# Patient Record
Sex: FEMALE | Race: ASIAN | NOT HISPANIC OR LATINO | URBAN - METROPOLITAN AREA
[De-identification: names, ages, dates, MRNs, and addresses within clinical notes are randomized per-mention and may not be internally consistent; named-entity substitution may affect disease eponyms.]

---

## 2018-09-08 ENCOUNTER — INPATIENT (INPATIENT)
Facility: HOSPITAL | Age: 58
LOS: 11 days | Discharge: NURSING, REHAB OR LONG-TERM CARE | DRG: 871 | End: 2018-09-20
Attending: INTERNAL MEDICINE | Admitting: INTERNAL MEDICINE
Payer: MEDICAID

## 2018-09-08 VITALS
DIASTOLIC BLOOD PRESSURE: 92 MMHG | RESPIRATION RATE: 22 BRPM | WEIGHT: 139.99 LBS | HEART RATE: 100 BPM | SYSTOLIC BLOOD PRESSURE: 155 MMHG

## 2018-09-08 DIAGNOSIS — Z29.9 ENCOUNTER FOR PROPHYLACTIC MEASURES, UNSPECIFIED: ICD-10-CM

## 2018-09-08 DIAGNOSIS — R41.82 ALTERED MENTAL STATUS, UNSPECIFIED: ICD-10-CM

## 2018-09-08 DIAGNOSIS — R41.89 OTHER SYMPTOMS AND SIGNS INVOLVING COGNITIVE FUNCTIONS AND AWARENESS: ICD-10-CM

## 2018-09-08 DIAGNOSIS — I10 ESSENTIAL (PRIMARY) HYPERTENSION: ICD-10-CM

## 2018-09-08 DIAGNOSIS — I51.9 HEART DISEASE, UNSPECIFIED: ICD-10-CM

## 2018-09-08 DIAGNOSIS — E11.9 TYPE 2 DIABETES MELLITUS WITHOUT COMPLICATIONS: ICD-10-CM

## 2018-09-08 DIAGNOSIS — E72.20 DISORDER OF UREA CYCLE METABOLISM, UNSPECIFIED: ICD-10-CM

## 2018-09-08 DIAGNOSIS — Z98.890 OTHER SPECIFIED POSTPROCEDURAL STATES: Chronic | ICD-10-CM

## 2018-09-08 LAB
ALBUMIN SERPL ELPH-MCNC: 3.7 G/DL — SIGNIFICANT CHANGE UP (ref 3.5–5)
ALP SERPL-CCNC: 85 U/L — SIGNIFICANT CHANGE UP (ref 40–120)
ALT FLD-CCNC: 44 U/L DA — SIGNIFICANT CHANGE UP (ref 10–60)
AMMONIA BLD-MCNC: 87 UMOL/L — HIGH (ref 11–32)
ANION GAP SERPL CALC-SCNC: 9 MMOL/L — SIGNIFICANT CHANGE UP (ref 5–17)
APAP SERPL-MCNC: <2 UG/ML — LOW (ref 10–30)
APPEARANCE UR: CLEAR — SIGNIFICANT CHANGE UP
APTT BLD: 34.2 SEC — SIGNIFICANT CHANGE UP (ref 27.5–37.4)
AST SERPL-CCNC: 25 U/L — SIGNIFICANT CHANGE UP (ref 10–40)
BASE EXCESS BLDA CALC-SCNC: -1.7 MMOL/L — SIGNIFICANT CHANGE UP (ref -2–2)
BASE EXCESS BLDA CALC-SCNC: -3.2 MMOL/L — LOW (ref -2–2)
BASOPHILS # BLD AUTO: 0.1 K/UL — SIGNIFICANT CHANGE UP (ref 0–0.2)
BASOPHILS NFR BLD AUTO: 0.5 % — SIGNIFICANT CHANGE UP (ref 0–2)
BILIRUB SERPL-MCNC: <0.1 MG/DL — LOW (ref 0.2–1.2)
BILIRUB UR-MCNC: NEGATIVE — SIGNIFICANT CHANGE UP
BLOOD GAS COMMENTS ARTERIAL: SIGNIFICANT CHANGE UP
BLOOD GAS COMMENTS ARTERIAL: SIGNIFICANT CHANGE UP
BUN SERPL-MCNC: 20 MG/DL — HIGH (ref 7–18)
CALCIUM SERPL-MCNC: 8.9 MG/DL — SIGNIFICANT CHANGE UP (ref 8.4–10.5)
CHLORIDE SERPL-SCNC: 104 MMOL/L — SIGNIFICANT CHANGE UP (ref 96–108)
CO2 SERPL-SCNC: 23 MMOL/L — SIGNIFICANT CHANGE UP (ref 22–31)
COLOR SPEC: YELLOW — SIGNIFICANT CHANGE UP
CREAT SERPL-MCNC: 0.77 MG/DL — SIGNIFICANT CHANGE UP (ref 0.5–1.3)
DIFF PNL FLD: ABNORMAL
EOSINOPHIL # BLD AUTO: 0.2 K/UL — SIGNIFICANT CHANGE UP (ref 0–0.5)
EOSINOPHIL NFR BLD AUTO: 1.7 % — SIGNIFICANT CHANGE UP (ref 0–6)
ETHANOL SERPL-MCNC: <3 MG/DL — SIGNIFICANT CHANGE UP (ref 0–10)
GLUCOSE BLDC GLUCOMTR-MCNC: 139 MG/DL — HIGH (ref 70–99)
GLUCOSE BLDC GLUCOMTR-MCNC: 142 MG/DL — HIGH (ref 70–99)
GLUCOSE BLDC GLUCOMTR-MCNC: 146 MG/DL — HIGH (ref 70–99)
GLUCOSE BLDC GLUCOMTR-MCNC: 163 MG/DL — HIGH (ref 70–99)
GLUCOSE BLDC GLUCOMTR-MCNC: 168 MG/DL — HIGH (ref 70–99)
GLUCOSE BLDC GLUCOMTR-MCNC: 198 MG/DL — HIGH (ref 70–99)
GLUCOSE BLDC GLUCOMTR-MCNC: 65 MG/DL — LOW (ref 70–99)
GLUCOSE BLDC GLUCOMTR-MCNC: 74 MG/DL — SIGNIFICANT CHANGE UP (ref 70–99)
GLUCOSE BLDC GLUCOMTR-MCNC: 78 MG/DL — SIGNIFICANT CHANGE UP (ref 70–99)
GLUCOSE SERPL-MCNC: 151 MG/DL — HIGH (ref 70–99)
GLUCOSE UR QL: NEGATIVE — SIGNIFICANT CHANGE UP
HCO3 BLDA-SCNC: 22 MMOL/L — LOW (ref 23–27)
HCO3 BLDA-SCNC: 22 MMOL/L — LOW (ref 23–27)
HCT VFR BLD CALC: 35.5 % — SIGNIFICANT CHANGE UP (ref 34.5–45)
HGB BLD-MCNC: 11.6 G/DL — SIGNIFICANT CHANGE UP (ref 11.5–15.5)
HOROWITZ INDEX BLDA+IHG-RTO: 28 — SIGNIFICANT CHANGE UP
HOROWITZ INDEX BLDA+IHG-RTO: 40 — SIGNIFICANT CHANGE UP
INR BLD: 0.93 RATIO — SIGNIFICANT CHANGE UP (ref 0.88–1.16)
KETONES UR-MCNC: ABNORMAL
LACTATE SERPL-SCNC: 0.6 MMOL/L — LOW (ref 0.7–2)
LEUKOCYTE ESTERASE UR-ACNC: ABNORMAL
LYMPHOCYTES # BLD AUTO: 1.6 K/UL — SIGNIFICANT CHANGE UP (ref 1–3.3)
LYMPHOCYTES # BLD AUTO: 14.3 % — SIGNIFICANT CHANGE UP (ref 13–44)
MCHC RBC-ENTMCNC: 28 PG — SIGNIFICANT CHANGE UP (ref 27–34)
MCHC RBC-ENTMCNC: 32.5 GM/DL — SIGNIFICANT CHANGE UP (ref 32–36)
MCV RBC AUTO: 85.9 FL — SIGNIFICANT CHANGE UP (ref 80–100)
MONOCYTES # BLD AUTO: 0.6 K/UL — SIGNIFICANT CHANGE UP (ref 0–0.9)
MONOCYTES NFR BLD AUTO: 5 % — SIGNIFICANT CHANGE UP (ref 2–14)
NEUTROPHILS # BLD AUTO: 8.9 K/UL — HIGH (ref 1.8–7.4)
NEUTROPHILS NFR BLD AUTO: 78.4 % — HIGH (ref 43–77)
NITRITE UR-MCNC: NEGATIVE — SIGNIFICANT CHANGE UP
PCO2 BLDA: 36 MMHG — SIGNIFICANT CHANGE UP (ref 32–46)
PCO2 BLDA: 40 MMHG — SIGNIFICANT CHANGE UP (ref 32–46)
PCP SPEC-MCNC: SIGNIFICANT CHANGE UP
PH BLDA: 7.35 — SIGNIFICANT CHANGE UP (ref 7.35–7.45)
PH BLDA: 7.4 — SIGNIFICANT CHANGE UP (ref 7.35–7.45)
PH UR: 6 — SIGNIFICANT CHANGE UP (ref 5–8)
PLATELET # BLD AUTO: 329 K/UL — SIGNIFICANT CHANGE UP (ref 150–400)
PO2 BLDA: 100 MMHG — SIGNIFICANT CHANGE UP (ref 74–108)
PO2 BLDA: 92 MMHG — SIGNIFICANT CHANGE UP (ref 74–108)
POTASSIUM SERPL-MCNC: 4.6 MMOL/L — SIGNIFICANT CHANGE UP (ref 3.5–5.3)
POTASSIUM SERPL-SCNC: 4.6 MMOL/L — SIGNIFICANT CHANGE UP (ref 3.5–5.3)
PROT SERPL-MCNC: 8.3 G/DL — SIGNIFICANT CHANGE UP (ref 6–8.3)
PROT UR-MCNC: 100
PROTHROM AB SERPL-ACNC: 10.1 SEC — SIGNIFICANT CHANGE UP (ref 9.8–12.7)
RBC # BLD: 4.13 M/UL — SIGNIFICANT CHANGE UP (ref 3.8–5.2)
RBC # FLD: 12.9 % — SIGNIFICANT CHANGE UP (ref 10.3–14.5)
SALICYLATES SERPL-MCNC: <1.7 MG/DL — LOW (ref 2.8–20)
SAO2 % BLDA: 97 % — HIGH (ref 92–96)
SAO2 % BLDA: 97 % — HIGH (ref 92–96)
SODIUM SERPL-SCNC: 136 MMOL/L — SIGNIFICANT CHANGE UP (ref 135–145)
SP GR SPEC: 1.01 — SIGNIFICANT CHANGE UP (ref 1.01–1.02)
T4 AB SER-ACNC: 10.9 UG/DL — SIGNIFICANT CHANGE UP (ref 4.6–12)
TROPONIN I SERPL-MCNC: <0.015 NG/ML — SIGNIFICANT CHANGE UP (ref 0–0.04)
TSH SERPL-MCNC: 0.62 UU/ML — SIGNIFICANT CHANGE UP (ref 0.34–4.82)
UROBILINOGEN FLD QL: NEGATIVE — SIGNIFICANT CHANGE UP
WBC # BLD: 11.3 K/UL — HIGH (ref 3.8–10.5)
WBC # FLD AUTO: 11.3 K/UL — HIGH (ref 3.8–10.5)

## 2018-09-08 PROCEDURE — 99285 EMERGENCY DEPT VISIT HI MDM: CPT

## 2018-09-08 PROCEDURE — 71045 X-RAY EXAM CHEST 1 VIEW: CPT | Mod: 26

## 2018-09-08 PROCEDURE — 71045 X-RAY EXAM CHEST 1 VIEW: CPT | Mod: 26,77

## 2018-09-08 PROCEDURE — 70450 CT HEAD/BRAIN W/O DYE: CPT | Mod: 26

## 2018-09-08 PROCEDURE — 31500 INSERT EMERGENCY AIRWAY: CPT | Mod: 59

## 2018-09-08 RX ORDER — VANCOMYCIN HCL 1 G
1000 VIAL (EA) INTRAVENOUS ONCE
Qty: 0 | Refills: 0 | Status: COMPLETED | OUTPATIENT
Start: 2018-09-08 | End: 2018-09-08

## 2018-09-08 RX ORDER — SIMVASTATIN 20 MG/1
20 TABLET, FILM COATED ORAL AT BEDTIME
Qty: 0 | Refills: 0 | Status: DISCONTINUED | OUTPATIENT
Start: 2018-09-08 | End: 2018-09-20

## 2018-09-08 RX ORDER — LOSARTAN POTASSIUM 100 MG/1
100 TABLET, FILM COATED ORAL DAILY
Qty: 0 | Refills: 0 | Status: DISCONTINUED | OUTPATIENT
Start: 2018-09-08 | End: 2018-09-08

## 2018-09-08 RX ORDER — ACETAMINOPHEN 500 MG
650 TABLET ORAL EVERY 6 HOURS
Qty: 0 | Refills: 0 | Status: DISCONTINUED | OUTPATIENT
Start: 2018-09-08 | End: 2018-09-20

## 2018-09-08 RX ORDER — RANITIDINE HYDROCHLORIDE 150 MG/1
1 TABLET, FILM COATED ORAL
Qty: 0 | Refills: 0 | COMMUNITY

## 2018-09-08 RX ORDER — PANTOPRAZOLE SODIUM 20 MG/1
40 TABLET, DELAYED RELEASE ORAL
Qty: 0 | Refills: 0 | Status: DISCONTINUED | OUTPATIENT
Start: 2018-09-08 | End: 2018-09-09

## 2018-09-08 RX ORDER — SODIUM CHLORIDE 9 MG/ML
1000 INJECTION, SOLUTION INTRAVENOUS
Qty: 0 | Refills: 0 | Status: DISCONTINUED | OUTPATIENT
Start: 2018-09-08 | End: 2018-09-09

## 2018-09-08 RX ORDER — MORPHINE SULFATE 50 MG/1
2 CAPSULE, EXTENDED RELEASE ORAL EVERY 4 HOURS
Qty: 0 | Refills: 0 | Status: DISCONTINUED | OUTPATIENT
Start: 2018-09-08 | End: 2018-09-10

## 2018-09-08 RX ORDER — PROPOFOL 10 MG/ML
5 INJECTION, EMULSION INTRAVENOUS
Qty: 500 | Refills: 0 | Status: DISCONTINUED | OUTPATIENT
Start: 2018-09-08 | End: 2018-09-08

## 2018-09-08 RX ORDER — DEXTROSE 50 % IN WATER 50 %
25 SYRINGE (ML) INTRAVENOUS ONCE
Qty: 0 | Refills: 0 | Status: DISCONTINUED | OUTPATIENT
Start: 2018-09-08 | End: 2018-09-10

## 2018-09-08 RX ORDER — HEPARIN SODIUM 5000 [USP'U]/ML
5000 INJECTION INTRAVENOUS; SUBCUTANEOUS EVERY 8 HOURS
Qty: 0 | Refills: 0 | Status: DISCONTINUED | OUTPATIENT
Start: 2018-09-08 | End: 2018-09-09

## 2018-09-08 RX ORDER — DEXTROSE 50 % IN WATER 50 %
25 SYRINGE (ML) INTRAVENOUS ONCE
Qty: 0 | Refills: 0 | Status: DISCONTINUED | OUTPATIENT
Start: 2018-09-08 | End: 2018-09-12

## 2018-09-08 RX ORDER — PIPERACILLIN AND TAZOBACTAM 4; .5 G/20ML; G/20ML
3.38 INJECTION, POWDER, LYOPHILIZED, FOR SOLUTION INTRAVENOUS ONCE
Qty: 0 | Refills: 0 | Status: COMPLETED | OUTPATIENT
Start: 2018-09-08 | End: 2018-09-08

## 2018-09-08 RX ORDER — INSULIN LISPRO 100/ML
VIAL (ML) SUBCUTANEOUS
Qty: 0 | Refills: 0 | Status: DISCONTINUED | OUTPATIENT
Start: 2018-09-08 | End: 2018-09-08

## 2018-09-08 RX ORDER — SIMVASTATIN 20 MG/1
1 TABLET, FILM COATED ORAL
Qty: 0 | Refills: 0 | COMMUNITY

## 2018-09-08 RX ORDER — SODIUM CHLORIDE 9 MG/ML
1000 INJECTION INTRAMUSCULAR; INTRAVENOUS; SUBCUTANEOUS ONCE
Qty: 0 | Refills: 0 | Status: COMPLETED | OUTPATIENT
Start: 2018-09-08 | End: 2018-09-08

## 2018-09-08 RX ORDER — CARVEDILOL PHOSPHATE 80 MG/1
12.5 CAPSULE, EXTENDED RELEASE ORAL EVERY 12 HOURS
Qty: 0 | Refills: 0 | Status: DISCONTINUED | OUTPATIENT
Start: 2018-09-08 | End: 2018-09-08

## 2018-09-08 RX ORDER — SODIUM CHLORIDE 9 MG/ML
1000 INJECTION, SOLUTION INTRAVENOUS
Qty: 0 | Refills: 0 | Status: DISCONTINUED | OUTPATIENT
Start: 2018-09-08 | End: 2018-09-08

## 2018-09-08 RX ORDER — GABAPENTIN 400 MG/1
1 CAPSULE ORAL
Qty: 0 | Refills: 0 | COMMUNITY

## 2018-09-08 RX ORDER — SODIUM CHLORIDE 9 MG/ML
1000 INJECTION, SOLUTION INTRAVENOUS
Qty: 0 | Refills: 0 | Status: DISCONTINUED | OUTPATIENT
Start: 2018-09-08 | End: 2018-09-10

## 2018-09-08 RX ORDER — INSULIN HUMAN 100 [IU]/ML
INJECTION, SOLUTION SUBCUTANEOUS EVERY 6 HOURS
Qty: 0 | Refills: 0 | Status: DISCONTINUED | OUTPATIENT
Start: 2018-09-08 | End: 2018-09-08

## 2018-09-08 RX ORDER — DEXTROSE 50 % IN WATER 50 %
50 SYRINGE (ML) INTRAVENOUS ONCE
Qty: 0 | Refills: 0 | Status: COMPLETED | OUTPATIENT
Start: 2018-09-08 | End: 2018-09-08

## 2018-09-08 RX ORDER — ASPIRIN/CALCIUM CARB/MAGNESIUM 324 MG
81 TABLET ORAL DAILY
Qty: 0 | Refills: 0 | Status: DISCONTINUED | OUTPATIENT
Start: 2018-09-08 | End: 2018-09-09

## 2018-09-08 RX ORDER — FERROUS SULFATE 325(65) MG
325 TABLET ORAL DAILY
Qty: 0 | Refills: 0 | Status: DISCONTINUED | OUTPATIENT
Start: 2018-09-08 | End: 2018-09-09

## 2018-09-08 RX ORDER — GLUCAGON INJECTION, SOLUTION 0.5 MG/.1ML
1 INJECTION, SOLUTION SUBCUTANEOUS ONCE
Qty: 0 | Refills: 0 | Status: DISCONTINUED | OUTPATIENT
Start: 2018-09-08 | End: 2018-09-10

## 2018-09-08 RX ORDER — INSULIN LISPRO 100/ML
VIAL (ML) SUBCUTANEOUS EVERY 6 HOURS
Qty: 0 | Refills: 0 | Status: DISCONTINUED | OUTPATIENT
Start: 2018-09-08 | End: 2018-09-09

## 2018-09-08 RX ORDER — FENTANYL CITRATE 50 UG/ML
0.5 INJECTION INTRAVENOUS
Qty: 2500 | Refills: 0 | Status: DISCONTINUED | OUTPATIENT
Start: 2018-09-08 | End: 2018-09-10

## 2018-09-08 RX ORDER — DEXTROSE 50 % IN WATER 50 %
15 SYRINGE (ML) INTRAVENOUS ONCE
Qty: 0 | Refills: 0 | Status: DISCONTINUED | OUTPATIENT
Start: 2018-09-08 | End: 2018-09-10

## 2018-09-08 RX ORDER — FERROUS SULFATE 325(65) MG
1 TABLET ORAL
Qty: 0 | Refills: 0 | COMMUNITY

## 2018-09-08 RX ORDER — DEXTROSE 50 % IN WATER 50 %
12.5 SYRINGE (ML) INTRAVENOUS ONCE
Qty: 0 | Refills: 0 | Status: DISCONTINUED | OUTPATIENT
Start: 2018-09-08 | End: 2018-09-10

## 2018-09-08 RX ORDER — ENOXAPARIN SODIUM 100 MG/ML
40 INJECTION SUBCUTANEOUS DAILY
Qty: 0 | Refills: 0 | Status: DISCONTINUED | OUTPATIENT
Start: 2018-09-08 | End: 2018-09-08

## 2018-09-08 RX ORDER — PROPOFOL 10 MG/ML
4.77 INJECTION, EMULSION INTRAVENOUS
Qty: 1000 | Refills: 0 | Status: DISCONTINUED | OUTPATIENT
Start: 2018-09-08 | End: 2018-09-09

## 2018-09-08 RX ORDER — ASPIRIN/CALCIUM CARB/MAGNESIUM 324 MG
300 TABLET ORAL ONCE
Qty: 0 | Refills: 0 | Status: COMPLETED | OUTPATIENT
Start: 2018-09-08 | End: 2018-09-08

## 2018-09-08 RX ORDER — PROPOFOL 10 MG/ML
5 INJECTION, EMULSION INTRAVENOUS
Qty: 1000 | Refills: 0 | Status: DISCONTINUED | OUTPATIENT
Start: 2018-09-08 | End: 2018-09-08

## 2018-09-08 RX ORDER — CARVEDILOL PHOSPHATE 80 MG/1
25 CAPSULE, EXTENDED RELEASE ORAL EVERY 12 HOURS
Qty: 0 | Refills: 0 | Status: DISCONTINUED | OUTPATIENT
Start: 2018-09-08 | End: 2018-09-08

## 2018-09-08 RX ORDER — PIPERACILLIN AND TAZOBACTAM 4; .5 G/20ML; G/20ML
3.38 INJECTION, POWDER, LYOPHILIZED, FOR SOLUTION INTRAVENOUS EVERY 8 HOURS
Qty: 0 | Refills: 0 | Status: DISCONTINUED | OUTPATIENT
Start: 2018-09-08 | End: 2018-09-09

## 2018-09-08 RX ADMIN — PROPOFOL 1.91 MICROGRAM(S)/KG/MIN: 10 INJECTION, EMULSION INTRAVENOUS at 22:25

## 2018-09-08 RX ADMIN — FENTANYL CITRATE 3.33 MICROGRAM(S)/KG/HR: 50 INJECTION INTRAVENOUS at 22:15

## 2018-09-08 RX ADMIN — PROPOFOL 1.91 MICROGRAM(S)/KG/MIN: 10 INJECTION, EMULSION INTRAVENOUS at 11:49

## 2018-09-08 RX ADMIN — Medication 650 MILLIGRAM(S): at 00:00

## 2018-09-08 RX ADMIN — Medication 2 MILLIGRAM(S): at 17:53

## 2018-09-08 RX ADMIN — Medication 2 MILLIGRAM(S): at 13:45

## 2018-09-08 RX ADMIN — Medication 50 MILLILITER(S): at 16:40

## 2018-09-08 RX ADMIN — SIMVASTATIN 20 MILLIGRAM(S): 20 TABLET, FILM COATED ORAL at 22:25

## 2018-09-08 RX ADMIN — SODIUM CHLORIDE 80 MILLILITER(S): 9 INJECTION, SOLUTION INTRAVENOUS at 15:38

## 2018-09-08 RX ADMIN — Medication 300 MILLIGRAM(S): at 11:04

## 2018-09-08 RX ADMIN — SODIUM CHLORIDE 75 MILLILITER(S): 9 INJECTION, SOLUTION INTRAVENOUS at 14:00

## 2018-09-08 RX ADMIN — PIPERACILLIN AND TAZOBACTAM 200 GRAM(S): 4; .5 INJECTION, POWDER, LYOPHILIZED, FOR SOLUTION INTRAVENOUS at 11:04

## 2018-09-08 RX ADMIN — MORPHINE SULFATE 2 MILLIGRAM(S): 50 CAPSULE, EXTENDED RELEASE ORAL at 14:20

## 2018-09-08 RX ADMIN — SODIUM CHLORIDE 2000 MILLILITER(S): 9 INJECTION INTRAMUSCULAR; INTRAVENOUS; SUBCUTANEOUS at 15:21

## 2018-09-08 RX ADMIN — SODIUM CHLORIDE 2000 MILLILITER(S): 9 INJECTION INTRAMUSCULAR; INTRAVENOUS; SUBCUTANEOUS at 22:25

## 2018-09-08 RX ADMIN — PROPOFOL 1.91 MICROGRAM(S)/KG/MIN: 10 INJECTION, EMULSION INTRAVENOUS at 15:55

## 2018-09-08 RX ADMIN — ENOXAPARIN SODIUM 40 MILLIGRAM(S): 100 INJECTION SUBCUTANEOUS at 12:22

## 2018-09-08 RX ADMIN — MORPHINE SULFATE 2 MILLIGRAM(S): 50 CAPSULE, EXTENDED RELEASE ORAL at 14:50

## 2018-09-08 RX ADMIN — PROPOFOL 1.91 MICROGRAM(S)/KG/MIN: 10 INJECTION, EMULSION INTRAVENOUS at 11:05

## 2018-09-08 RX ADMIN — HEPARIN SODIUM 5000 UNIT(S): 5000 INJECTION INTRAVENOUS; SUBCUTANEOUS at 22:24

## 2018-09-08 RX ADMIN — SODIUM CHLORIDE 2000 MILLILITER(S): 9 INJECTION INTRAMUSCULAR; INTRAVENOUS; SUBCUTANEOUS at 23:46

## 2018-09-08 RX ADMIN — Medication 650 MILLIGRAM(S): at 22:41

## 2018-09-08 NOTE — H&P ADULT - PROBLEM SELECTOR PLAN 4
-Family not sure about exact diagnosis  -c/w aspirin, statin and coreg  -Holding Losartan and HCTZ for now, restart as appropriate.  -f/u ECHO. -c/w Coreg for now.   -Hold Losartan and HCTZ for now , restart if needed -Hold home BP meds Coreg, Losartan and HCTZ for now as BP low normal, restart if needed

## 2018-09-08 NOTE — PROGRESS NOTE ADULT - SUBJECTIVE AND OBJECTIVE BOX
MED ATTENDING INITIAL KENISHA       JACQUELIN LINDER  MRN-[id number]    Patient is a 57y old  Female who presents with a chief complaint of unresponsiveness (08 Sep 2018 13:02)      HISTORY OF PRESENT ILLNESS:  58 y/o female from [facility] rehab with PMH of HTN, DM and ?cardiac disease (family not sure about exact diagnosis) and PSH of recent C spine surgery (1 month back at [place] for ?nerve compression) sent to ED for unresponsiveness. As per EMS, patient was thought to have low blood sugars so was treated for that without improvement in mental status. Fs in . CT head was unremarkable. CXR clear. Utox only positive for benzodiazepine ([patient is on Valium 5 mg q12). Patient was intubated by ED attending and admitted to ICU for further management.     Per rehab papers, patient was noted to have blood sugars of 49 last night at 9 pm, was given 2 mg of glucagon IM. AM fs at rehab was noted to be 73 and patient was minimally responsive so EMS was called. Spoke to Dr. Welch who takes care of her at rehab. Staff at rehab found patient's home medications at bedside, seems patient took her own Insulin on top of the Insulin given bu rehab staff , leading to hypoglycemia and unresponsiveness. Patient was last seen normal last night before dinner.     MEDICATIONS  (STANDING):  aspirin  chewable 81 milliGRAM(s) Oral daily  dextrose 5% + sodium chloride 0.9%. 1000 milliLiter(s) (80 mL/Hr) IV Continuous <Continuous>  dextrose 5%. 1000 milliLiter(s) (50 mL/Hr) IV Continuous <Continuous>  dextrose 50% Injectable 12.5 Gram(s) IV Push once  dextrose 50% Injectable 25 Gram(s) IV Push once  dextrose 50% Injectable 25 Gram(s) IV Push once  ferrous    sulfate 325 milliGRAM(s) Oral daily  heparin  Injectable 5000 Unit(s) SubCutaneous every 8 hours  insulin lispro (HumaLOG) corrective regimen sliding scale   SubCutaneous every 6 hours  pantoprazole   Suspension 40 milliGRAM(s) Oral before breakfast  propofol Infusion 4.767 MICROgram(s)/kG/Min (1.905 mL/Hr) IV Continuous <Continuous>  simvastatin 20 milliGRAM(s) Oral at bedtime      MEDICATIONS  (PRN):  dextrose 40% Gel 15 Gram(s) Oral once PRN Blood Glucose LESS THAN 70 milliGRAM(s)/deciliter  glucagon  Injectable 1 milliGRAM(s) IntraMuscular once PRN Glucose LESS THAN 70 milligrams/deciliter  LORazepam   Injectable 2 milliGRAM(s) IV Push every 4 hours PRN Agitation  morphine  - Injectable 2 milliGRAM(s) IV Push every 4 hours PRN Severe Pain (7 - 10)      Allergies    No Known Allergies    Intolerances        PAST MEDICAL & SURGICAL HISTORY:  Cardiac disease  DM (diabetes mellitus)  HTN (hypertension)  H/O cervical spine surgery      FAMILY HISTORY:  Family history unknown      SOCIAL HISTORY  Smoking History:     REVIEW OF SYSTEMS:    CONSTITUTIONAL:  Fevers [  ]  Yes  [  X]  No                                   chills [  ]  Yes  [X  ]  No                               sweats  [  ]  Yes  [ X ]  No                                fatigue [  ]  Yes  [ X ]  No    HEENT:  Eyes:  Diplopia or blurred vision [  ]  Yes  [  ]  No    ENT:                        earache  [  ]  Yes  [  ]  No                             sore throat  [  ]  Yes  [  ]  No                            runny nose.  [  ]  Yes  [  ]  No    CARDIOVASCULAR:       chest pain  [  ]  Yes  [  ]  No                        squeezing, tightness,  [  ]  Yes  [  ]  No                                      palpitations.  [  ]  Yes  [  ]  No    RESPIRATORY:             Cough [  ]  Yes  [  ]  No                                  Wheezing [  ]  Yes  [  ]  No                                Hemoptysis [  ]  Yes  [  ]  No                                      Sputum [  ]  Yes  [  ]  No                   Shortness of Breathe [  ]  Yes  [  ]  No    GASTROINTESTINAL:      Abdominal pain  [  ]  Yes  [  ]  No                                                    Nausea  [  ]  Yes  [  ]  No                                                   Vomiting [  ]  Yes  [  ]  No                                              Constipation [  ]  Yes  [  ]  No                                                    Diarrhea [  ]  Yes  [  ]  No    GENITOURINARY:           Incontinence [  ]  Yes  [  ]  No                                                Dysuria [  ]  Yes  [  ]  No                                      Blood in Urine  [  ]  Yes  [  ]  No                          Frequency or urgency.  [  ]  Yes  [  ]  No    NEUROLOGIC:   UNRESPONSIVE AT PRESENT                  Paresthesias  [  ]  Yes  [  ]  No                                             fasciculations  [  ]  Yes  [  ]  No                                seizures or weakness.  [  ]  Yes  [  ]  No                                                   Headache [  ]  Yes  [  ]  No                                  Loss of Conciousness [  ]  Yes  [  ]  No                                                     Insomnia [  ]  Yes  [  ]  No    PSYCHIATRIC:    Disorder of thought or mood.  [  ]  Yes  [  ]  No                                                           Anxiety [  ]  Yes  [  ]  No                                                      Depression [  ]  Yes  [  ]  No                                                         Dementia [  ]  Yes  [  ]  No    Vital Signs Last 24 Hrs  T(C): 37.8 (08 Sep 2018 15:00), Max: 37.8 (08 Sep 2018 15:00)  T(F): 100 (08 Sep 2018 15:00), Max: 100 (08 Sep 2018 15:00)  HR: 88 (08 Sep 2018 15:30) (88 - 109)  BP: 92/59 (08 Sep 2018 15:30) (83/54 - 183/155)  BP(mean): 66 (08 Sep 2018 15:30) (60 - 162)  RR: 12 (08 Sep 2018 15:30) (12 - 26)  SpO2: 100% (08 Sep 2018 15:30) (95% - 100%)  I&O's Detail    08 Sep 2018 07:01  -  08 Sep 2018 15:48  --------------------------------------------------------  IN:    dextrose 5% + sodium chloride 0.45%.: 150 mL    dextrose 5% + sodium chloride 0.9%.: 80 mL    propofol Infusion: 58.8 mL  Total IN: 288.8 mL    OUT:    Indwelling Catheter - Urethral: 170 mL  Total OUT: 170 mL    Total NET: 118.8 mL          PHYSICAL EXAMINATION:    GENERAL: The patient is a well-developed, well-nourished _____in no apparent distress.     HEENT:  ON VENT     NECK: Supple. jvd [  ]  Yes  [  X]  No    LUNGS: Clear to auscultation  [  X]  Yes  [  ]  No                               wheezing, [  ]  Yes  [X  ]  No                                      rales  [  ]  Yes  [  ]X  No                                    rhonchi  [  ]  Yes  [  X]  No                 Respirations labored  [  ]  Yes  [  ]  No    HEART: Regular rate and rhythm  [ X ]  Yes  [  ]  No                                        Murmur [  ]  Yes  [  X]  No                                              rub  [  ]  Yes  [ X ]  No                                          gallop  [  ]  Yes  [  ]X  No    ABDOMEN:                                 Soft, X[  ]  Yes  [  ]  No                                             nontender [X  ]  Yes  [  ]  No                                             distended.[  ]  Yes  [X  ]  No                            hepatosplenomegaly ,[  ]  Yes  [ X ]  No                                                    BS   [  ]  Yes  [  ]  No    EXTREMITIES:                cyanosis, [  ]  Yes  [ X ]  No                                       clubbing,   [  ]  Yes  [  X]  No                                               rash, [  ]  Yes  [ X ]  No                                           edema.  [  ]  Yes  [ X ]  No    NEUROLOGIC:   UN RESPONSIVE .      LABS:                        11.6   11.3  )-----------( 329      ( 08 Sep 2018 08:27 )             35.5         136  |  104  |  20<H>  ----------------------------<  151<H>  4.6   |  23  |  0.77    Ca    8.9      08 Sep 2018 08:27    TPro  8.3  /  Alb  3.7  /  TBili  <0.1<L>  /  DBili  x   /  AST  25  /  ALT  44  /  AlkPhos  85      PT/INR - ( 08 Sep 2018 08:27 )   PT: 10.1 sec;   INR: 0.93 ratio         PTT - ( 08 Sep 2018 08:27 )  PTT:34.2 sec  Urinalysis Basic - ( 08 Sep 2018 15:09 )    Color: Yellow / Appearance: Clear / S.010 / pH: x  Gluc: x / Ketone: Trace  / Bili: Negative / Urobili: Negative   Blood: x / Protein: 100 / Nitrite: Negative   Leuk Esterase: Trace / RBC: 0-2 /HPF / WBC 0-2 /HPF   Sq Epi: x / Non Sq Epi: Few /HPF / Bacteria: Negative /HPF      ABG - ( 08 Sep 2018 10:08 )  pH, Arterial: 7.40  pH, Blood: x     /  pCO2: 36    /  pO2: 92    / HCO3: 22    / Base Excess: -1.7  /  SaO2: 97                CARDIAC MARKERS ( 08 Sep 2018 08:27 )  <0.015 ng/mL / x     / x     / x     / x              Lactate, Blood: 0.6 mmol/L (18 @ 08:27)        MICROBIOLOGY:    RADIOLOGY & ADDITIONAL STUDIES:    CXR:  < from: Xray Chest 1 View AP/PA (18 @ 10:06) >  Impression: Successful placement of ET tube and NG tube  Question of developing infiltrate at the right lung base.      < from: CT Head No Cont (18 @ 07:55) >  IMPRESSION:     Limited study demonstrates no obvious abnormality as described above.            < end of copied text >    < end of copied text >    Ct scan chest:    ekg;    echo:

## 2018-09-08 NOTE — CONSULT NOTE ADULT - SUBJECTIVE AND OBJECTIVE BOX
Patient is a 57y old  Female who presents with a chief complaint of unresponsiveness (08 Sep 2018 11:48)      Initial HPI on admission:  HPI:  56 y/o female from [facility] rehab with PMH of HTN, DM and ?cardiac disease (family not sure about exact diagnosis)  sent to ED for unresponsiveness. As per EMS, patient was thought to have low blood sugars so was treated for that without improvement in mental status. Fs in . CT head was unremarkable. CXR clear. Utox only positive for benzodiazepine ([patient is on Valium 5 mg q12). Patient was intubated by ED attending and admitted to ICU for further management.     Per rehab papers, patient was noted to have blood sugars of 49 last night at 9 pm, was given 2 mg of glucagon IM. AM fs at rehab was noted to be 73 and patient was minimally responsive so EMS was called. Spoke to Dr. Welch who takes care of her at rehab. Staff at rehab found patient's home medications at bedside, seems patient took her own Insulin on top of the Insulin given bu rehab staff , leading to hypoglycemia and unresponsiveness. Patient was last seen normal last night before dinner. (08 Sep 2018 11:48)      BRIEF HOSPITAL COURSE: ***    PAST MEDICAL & SURGICAL HISTORY:  DM (diabetes mellitus)  HTN (hypertension)    Allergies    No Known Allergies    Intolerances      FAMILY HISTORY:    Social history reviewed: ***    Review of Systems: pat is intubated and unresponsiv  CONSTITUTIONAL: No fever, chills, or fatigue  EYES: No eye pain, visual disturbances, or discharge  ENMT:  No difficulty hearing, tinnitus, vertigo; No sinus or throat pain  NECK: No pain or stiffness  RESPIRATORY: No cough, wheezing, chills or hemoptysis; No shortness of breath  CARDIOVASCULAR: No chest pain, palpitations, dizziness, or leg swelling  GASTROINTESTINAL: No abdominal or epigastric pain. No nausea, vomiting, or hematemesis; No diarrhea or constipation. No melena or hematochezia.  GENITOURINARY: No dysuria, frequency, hematuria, or incontinence  NEUROLOGICAL: No headaches, memory loss, loss of strength, numbness, or tremors  SKIN: No itching, burning, rashes, or lesions   MUSCULOSKELETAL: No joint pain or swelling; No muscle, back, or extremity pain  PSYCHIATRIC: No depression, anxiety, mood swings, or difficulty sleeping      Medications:  aspirin  chewable 81 milliGRAM(s) Oral daily  carvedilol 25 milliGRAM(s) Oral every 12 hours  dextrose 40% Gel 15 Gram(s) Oral once PRN  dextrose 5% + sodium chloride 0.45%. 1000 milliLiter(s) IV Continuous <Continuous>  dextrose 5%. 1000 milliLiter(s) IV Continuous <Continuous>  dextrose 50% Injectable 12.5 Gram(s) IV Push once  dextrose 50% Injectable 25 Gram(s) IV Push once  dextrose 50% Injectable 25 Gram(s) IV Push once  ferrous    sulfate 325 milliGRAM(s) Oral daily  glucagon  Injectable 1 milliGRAM(s) IntraMuscular once PRN  heparin  Injectable 5000 Unit(s) SubCutaneous every 8 hours  insulin lispro (HumaLOG) corrective regimen sliding scale   SubCutaneous three times a day before meals  losartan 100 milliGRAM(s) Oral daily  pantoprazole   Suspension 40 milliGRAM(s) Oral before breakfast  propofol Infusion 4.767 MICROgram(s)/kG/Min IV Continuous <Continuous>  simvastatin 20 milliGRAM(s) Oral at bedtime      vent settings  Mode: AC/ CMV (Assist Control/ Continuous Mandatory Ventilation)  RR (machine): 12  TV (machine): 450  FiO2: 40  PEEP: 5  ITime: 1  MAP: 10  PIP: 25      Vital Signs Last 24 Hrs  T(C): 36.9 (08 Sep 2018 11:22), Max: 36.9 (08 Sep 2018 11:22)  T(F): 98.5 (08 Sep 2018 11:22), Max: 98.5 (08 Sep 2018 11:22)  HR: 101 (08 Sep 2018 12:00) (100 - 109)  BP: 128/84 (08 Sep 2018 12:00) (110/80 - 183/155)  BP(mean): 93 (08 Sep 2018 12:00) (84 - 162)  RR: 14 (08 Sep 2018 12:00) (14 - 26)  SpO2: 100% (08 Sep 2018 12:00) (95% - 100%)    ABG - ( 08 Sep 2018 10:08 )  pH, Arterial: 7.40  pH, Blood: x     /  pCO2: 36    /  pO2: 92    / HCO3: 22    / Base Excess: -1.7  /  SaO2: 97                        LABS:                        11.6   11.3  )-----------( 329      ( 08 Sep 2018 08:27 )             35.5     09-08    136  |  104  |  20<H>  ----------------------------<  151<H>  4.6   |  23  |  0.77    Ca    8.9      08 Sep 2018 08:27    TPro  8.3  /  Alb  3.7  /  TBili  <0.1<L>  /  DBili  x   /  AST  25  /  ALT  44  /  AlkPhos  85  09-08      CARDIAC MARKERS ( 08 Sep 2018 08:27 )  <0.015 ng/mL / x     / x     / x     / x          CAPILLARY BLOOD GLUCOSE  251 (08 Sep 2018 08:38)      POCT Blood Glucose.: 251 mg/dL (08 Sep 2018 08:36)    PT/INR - ( 08 Sep 2018 08:27 )   PT: 10.1 sec;   INR: 0.93 ratio         PTT - ( 08 Sep 2018 08:27 )  PTT:34.2 sec    CULTURES:        Physical Examination:    General: No acute distress.      HEENT: Pupils equal, reactive to light.  Symmetric. + ETT    PULM: Clear to auscultation bilaterally, no significant sputum production    CVS: Regular rate and rhythm, no murmurs, rubs, or gallops    ABD: Soft, nondistended, nontender, normoactive bowel sounds, no masses    EXT: No edema, nontender    SKIN: Warm and well perfused, no rashes noted.    NEURO: comatose    RADIOLOGY REVIEWED ***    CXR:< from: Xray Chest 1 View AP/PA (09.08.18 @ 10:06) >  INTERPRETATION:  History: Postintubation    Portable radiograph of the chest formed and compared to study of earlier   in the day.    ET tube has been placed with the tip above the chencho and NG tube has   been placed overlying the stomach. The left lung is clear. There is mild   patchy density at the right base which may represent developing   infiltrate. Osseous structures are intact.    Impression: Successful placement of ET tube and NG tube  Question of developing infiltrate at the right lung base.      < end of copied text >      CT Head:< from: CT Head No Cont (09.08.18 @ 07:55) >  INTERPRETATION:  EXAM: CT HEAD WITHOUT CONTRAST.     CLINICAL INDICATION: Unresponsive.     TECHNIQUE: Noncontrast imaging was performed. Axial, sagittal and   coronal scans are reviewed.     PRIOR EXAM: None available.     FINDINGS:      Motion artifact limits the evaluation on multiple images.    There is no evidence of hydrocephalus, mass effect or any sizable   extra-axial collection. Due to motion artifact, subtle abnormalities   cannot be evaluated. Probably incidental lentiform nuclei calcification   is seen.    Bony calvarium, visualized mastoids, sinuses and orbits are unremarkable.      IMPRESSION:     Limited study demonstrates no obvious abnormality as described above.    < end of copied text >        IMPRESSION:PAST MEDICAL & SURGICAL HISTORY:  DM (diabetes mellitus)  HTN (hypertension)   p/w     IMP: This is a 57 yr old woman with prior mentioned medical found unresponsive due to hypoglycemia in rehab center. According to NH staff .. seems like pat took her own meds and also was given meds NH staff.  She was intubated for airway protection. mental status did not improve with administration od dextrose. CXR demonstrated possible new b/l infiltrate due to asp pna .  Plan:    CNS: no sedation, neurochescks     PULMONARY: continue vent support    CARDIAC: moniotor    GI: NGT feed    RENAL: monitor    SKIN: no issue    MUSCULOSKELETAL: PT    ID: hold antibx for now    HEME: FS q 2 hrs    DVT and GI Prophylaxis          Plan of care discussed with family and ICU team. and ER attending    CRITICAL CARE TIME SPENT: 36  minutes

## 2018-09-08 NOTE — H&P ADULT - PMH
DM (diabetes mellitus)    HTN (hypertension) Cardiac disease    DM (diabetes mellitus)    HTN (hypertension)

## 2018-09-08 NOTE — ED PROVIDER NOTE - CARE PLAN
Principal Discharge DX:	Altered mental status, unspecified altered mental status type  Secondary Diagnosis:	Hypoglycemia  Secondary Diagnosis:	Aspiration into airway, initial encounter

## 2018-09-08 NOTE — ED ADULT TRIAGE NOTE - CHIEF COMPLAINT QUOTE
Arrived w stertorous respirations, obtunded.  Limbs flaccid, no obvious trauma.  Elevated b/p in the field, fs wnl in the field

## 2018-09-08 NOTE — ED PROCEDURE NOTE - CPROC ED TIME OUT STATEMENT1
“Patient's name, , procedure and correct site were confirmed during the [place] Timeout.”
“Patient's name, , procedure and correct site were confirmed during the [place] Timeout.”
“Patient's name, , procedure and correct site were confirmed during the [place] Timeout.”

## 2018-09-08 NOTE — H&P ADULT - PROBLEM SELECTOR PLAN 2
-Home meds includes Basaglar 38 units at bedtime, Humalog 5 units pre meals and correctional scale   -Would only start on Humalog correction scale for now  -Monitor fs every 2 hours for now and adjust regimen as needed  -f/u HbA1c. -No renal or liver abnormalities noted  -Likely secondary to hypoglycemia

## 2018-09-08 NOTE — ED PROVIDER NOTE - PHYSICAL EXAMINATION
GENERAL: unresponsive, sonorous breathing   HEAD: atraumatic   EYES: EOMI, pink conjunctiva, pupils midrange and reactive    ENT: moist oral mucosa   CARDIAC: tachycardic, no edema, distal pulses present   RESPIRATORY: lungs CTAB, sonorous breathing   GASTROINTESTINAL: no abdominal tenderness, no rebound or guarding, bowel sounds presents  MUSCULOSKELETAL: no deformity   NEUROLOGICAL: gag reflex present; no response to noxious stimuli   SKIN: intact   HEME LYMPH: no lymphadenopathy

## 2018-09-08 NOTE — ED PROVIDER NOTE - PROGRESS NOTE DETAILS
Coley:  Pt received in signout at approximately 8 am today, with workup in progress for this unresponsive  Pt from [facility], came in by EMS after FS BG reportedly in 40's, and treated with Glucagon 2 mg but mental status not improved.  Pt not following commands, not protecting her airway, and therefore exam limited but had good spontaneous movement of LUE and LLE but very minimal movement of RUE and RLE, and completely aphasic but eyes open and tracking.  No signs of trauma.    -Stroke is on differential diagnosis, but CT head with no acute hemorrhage or acute abnormality, last seen at normal baseline at 8 pm last night per  (used Mandarin  on interpretor phone), well out of the window for tPA based on available history.  Given the state of Pt verbally unresponsive and with generalized weakness and not following commands, best available NIH stroke scale measurement just prior to intubation at approximately 9:20 am is score of 18.  -Dysphagia screen: failed  -Pt's  in ED and kept apprised using Mandarin  phone.  -ICU Dr. Franco accepts admission.

## 2018-09-08 NOTE — H&P ADULT - PROBLEM SELECTOR PLAN 5
IMPROVE VTE Individual Risk Assessment          RISK                                                          Points  [  ] Previous VTE                                                3  [  ] Thrombophilia                                             2  [  ] Lower limb paralysis                                   2        (unable to hold up >15 seconds)    [  ] Current Cancer                                             2         (within 6 months)  [ x ] Immobilization > 24 hrs                              1  [ x ] ICU/CCU stay > 24 hours                             1  [  ] Age > 60                                                         1    IMPROVE VTE Score:  2  Heparin sq for DVT ppx. -Family not sure about exact diagnosis  -c/w aspirin, statin and coreg  -Holding Losartan and HCTZ for now, restart as appropriate.  -f/u ECHO. -Family not sure about exact diagnosis  -c/w aspirin, statin  -Holding Coreg, Losartan and HCTZ for now, restart as appropriate.  -f/u ECHO.

## 2018-09-08 NOTE — ED PROVIDER NOTE - OBJECTIVE STATEMENT
58 yo F pmh of HTN and DM and recent spinal surgery presents by EMS from rehab for AMS. Per EMS, pt was thought to have low blood glucose and treated for that without improvement in mental status. Pt unable to provide history. No family at bedside. No interventions by EMS.

## 2018-09-08 NOTE — H&P ADULT - PROBLEM SELECTOR PLAN 1
-most likely secondary to hypoglycemia as patient was noted to be hypoglycemic to 40s last night at rehab, s/p Glucagon; fs in   -CT head negative for any acute event  -EKG: sinus tachycardia.   -CXR clear, lactate 0.6, UA pending ; s/p 1 dose of Zosyn in ED, not septic.   -No metabolic derangements currently  -Blood alcohol <3, serum acetaminophen and salicylate level wnl.   -Utox positive for benzodiazepines (takes valium 5 mg q12)  -Could be CVA v/s seizure, consider MRI when stable  -Continue with vent support, aspiration precautions  -Monitor blood sugars every 2 hours for now  -c/w Propofol for sedation   -IV hydration

## 2018-09-08 NOTE — ED ADULT NURSE NOTE - NSIMPLEMENTINTERV_GEN_ALL_ED
Implemented All Fall with Harm Risk Interventions:  [place] to call system. Call bell, personal items and telephone within reach. Instruct patient to call for assistance. Room bathroom lighting operational. Non-slip footwear when patient is off stretcher. Physically safe environment: no spills, clutter or unnecessary equipment. Stretcher in lowest position, wheels locked, appropriate side rails in place. Provide visual cue, wrist band, yellow gown, etc. Monitor gait and stability. Monitor for mental status changes and reorient to person, place, and time. Review medications for side effects contributing to fall risk. Reinforce activity limits and safety measures with patient and family. Provide visual clues: red socks.

## 2018-09-08 NOTE — H&P ADULT - PROBLEM SELECTOR PLAN 3
-c/w Coreg for now.   -Hold Losartan and HCTZ for now , restart if needed -Home meds includes Basaglar 38 units at bedtime, Humalog 5 units pre meals and correctional scale   -Would only start on Humalog correction scale for now  -Monitor fs every 2 hours for now and adjust regimen as needed  -f/u HbA1c.

## 2018-09-08 NOTE — H&P ADULT - PROBLEM SELECTOR PLAN 6
IMPROVE VTE Individual Risk Assessment          RISK                                                          Points  [  ] Previous VTE                                                3  [  ] Thrombophilia                                             2  [  ] Lower limb paralysis                                   2        (unable to hold up >15 seconds)    [  ] Current Cancer                                             2         (within 6 months)  [ x ] Immobilization > 24 hrs                              1  [ x ] ICU/CCU stay > 24 hours                             1  [  ] Age > 60                                                         1    IMPROVE VTE Score:  2  Heparin sq for DVT ppx.

## 2018-09-08 NOTE — ED PROCEDURE NOTE - PROCEDURE ADDITIONAL DETAILS
ETT appeared to be in appropriate position by end tidal CO2 detector, but may have been right mainstem, so withdrawn slightly but then appeared to have been dislodged, so needed to be removed and Pt then bagged with BVM.  See additional procedure note for following intubation attempt.

## 2018-09-08 NOTE — ED PROCEDURE NOTE - CPROC ED INFORMED CONSENT1
Benefits, risks, and possible complications of procedure explained to patient/caregiver who verbalized understanding and gave verbal consent.
This was an emergent procedure and consent was implied.
This was an emergent procedure and consent was implied.

## 2018-09-08 NOTE — H&P ADULT - ASSESSMENT
6 y/o female from [facility] rehab with PMH of HTN, DM and ?cardiac disease (family not sure about exact diagnosis) sent to ED for unresponsiveness. Likely secondary to hypoglycemia. Was intubated in ED and admitted to ICU. 8 y/o female from [facility] rehab with PMH of HTN, DM and ?cardiac disease (family not sure about exact diagnosis) and PSH of recent C spine surgery (1 month back at [place] for ?nerve compression) sent to ED for unresponsiveness. Likely secondary to hypoglycemia. Was intubated in ED and admitted to ICU. 58 y/o female from [facility] rehab with PMH of HTN, DM and ?cardiac disease (family not sure about exact diagnosis) and PSH of recent C spine surgery (1 month back at [place] for ?nerve compression) sent to ED for unresponsiveness. Likely secondary to hypoglycemia. Was intubated in ED and admitted to ICU.

## 2018-09-08 NOTE — H&P ADULT - HISTORY OF PRESENT ILLNESS
56 [facility]H of HTN, DM and ?cardiac disease (family not sure about exact diagnosis)  sent to ED for unresponsiveness. As per EMS, patient was thought to have low blood sugars so was treated for that without improvement in mental status. Fs in . CT head was unremarkable. CXR clear. Utox only positive for benzodiazepine ([patient is on Valium 5 mg q12). Patient was intubated by ED attending and admitted to ICU for further management.     Per rehab papers, patient was noted to have blood sugars of 49 last night at 9 pm, was given 2 mg of glucagon IM. AM fs at rehab was noted to be 73 and patient was minimally responsive so EMS was called. Spoke to Dr. Welch who takes care of her at rehab. Staff at rehab found patient's home medications at bedside, seems patient took her own Insulin on top of the Insulin given bu rehab staff , leading to hypoglycemia and unresponsiveness. Patient was last seen normal last night before dinner. 56 y/o female from [facility] rehab with PMH of HTN, DM and ?cardiac disease (family not sure about exact diagnosis) and PSH of recent C spine surgery (1 month back at [place] for ?nerve compression) sent to ED for unresponsiveness. As per EMS, patient was thought to have low blood sugars so was treated for that without improvement in mental status. Fs in . CT head was unremarkable. CXR clear. Utox only positive for benzodiazepine ([patient is on Valium 5 mg q12). Patient was intubated by ED attending and admitted to ICU for further management.     Per rehab papers, patient was noted to have blood sugars of 49 last night at 9 pm, was given 2 mg of glucagon IM. AM fs at rehab was noted to be 73 and patient was minimally responsive so EMS was called. Spoke to Dr. Welch who takes care of her at rehab. Staff at rehab found patient's home medications at bedside, seems patient took her own Insulin on top of the Insulin given bu rehab staff , leading to hypoglycemia and unresponsiveness. Patient was last seen normal last night before dinner.

## 2018-09-08 NOTE — PROGRESS NOTE ADULT - ASSESSMENT
Problem/Plan - 1:  ·  Problem: Unresponsiveness.  Plan: -most likely secondary to hypoglycemia as patient was noted to be hypoglycemic to 40s last night at rehab, s/p Glucagon; fs in   -CT head negative for any acute event  -EKG: sinus tachycardia.   -CXR clear, lactate 0.6, UA pending ; s/p 1 dose of Zosyn in ED, not septic.   -No metabolic derangements currently  -Blood alcohol <3, serum acetaminophen and salicylate level wnl.   -Utox positive for benzodiazepines (takes valium 5 mg q12)  -Could be CVA v/s seizure, consider MRI when stable  -Continue with vent support, aspiration precautions  -Monitor blood sugars every 2 hours for now  -c/w Propofol for sedation   -IV hydration.     Problem/Plan - 2:  ·  Problem: Hyperammonemia.  Plan: -No renal or liver abnormalities noted  -Likely secondary to hypoglycemia.   Endoconsult     Problem/Plan - 3:  ·  Problem: DM (diabetes mellitus).  Plan: -Home meds includes Basaglar 38 units at bedtime, Humalog 5 units pre meals and correctional scale   -Would only start on Humalog correction scale for now  -Monitor fs every 2 hours for now and adjust regimen as needed  -f/u HbA1c.     Problem/Plan - 4:  ·  Problem: HTN (hypertension).  Plan: -Hold home BP meds Coreg, Losartan and HCTZ for now as BP low normal, restart if needed.     Problem/Plan - 5:  ·  Problem: Cardiac disease.  Plan: -Family not sure about exact diagnosis  -c/w aspirin, statin  -Holding Coreg, Losartan and HCTZ for now, restart as appropriate.  -f/u ECHO.     Problem/Plan - 6:  Problem: Prophylactic measure. Plan: IMPROVE VTE Individual Risk Assessment          RISK                                                          Points  [  ] Previous VTE                                                3  [  ] Thrombophilia                                             2  [  ] Lower limb paralysis                                   2        (unable to hold up >15 seconds)    [  ] Current Cancer                                             2         (within 6 months)  [ x ] Immobilization > 24 hrs                              1  [ x ] ICU/CCU stay > 24 hours                             1  [  ] Age > 60                                                         1    IMPROVE VTE Score:  2  Heparin sq for DVT ppx.

## 2018-09-08 NOTE — ED PROCEDURE NOTE - CPROC ED INDICATIONS1
tube withdrawn too far, prior diff intubation
airway protection
decompression of stomach with ETT in place

## 2018-09-08 NOTE — ED PROVIDER NOTE - MEDICAL DECISION MAKING DETAILS
56 yo F brought in from rehab after being found unresponsive.     Diff dx - ICH, CVA, hypoglycemia, seizure/post-ictal, encephalopathic     Plan - FSBG, CT head, labs, UA, tox screen, EKG, observe and reassess

## 2018-09-09 DIAGNOSIS — R57.8 OTHER SHOCK: ICD-10-CM

## 2018-09-09 DIAGNOSIS — E16.2 HYPOGLYCEMIA, UNSPECIFIED: ICD-10-CM

## 2018-09-09 DIAGNOSIS — J18.9 PNEUMONIA, UNSPECIFIED ORGANISM: ICD-10-CM

## 2018-09-09 DIAGNOSIS — A41.9 SEPSIS, UNSPECIFIED ORGANISM: ICD-10-CM

## 2018-09-09 DIAGNOSIS — J96.00 ACUTE RESPIRATORY FAILURE, UNSPECIFIED WHETHER WITH HYPOXIA OR HYPERCAPNIA: ICD-10-CM

## 2018-09-09 LAB
ANION GAP SERPL CALC-SCNC: 9 MMOL/L — SIGNIFICANT CHANGE UP (ref 5–17)
BASE EXCESS BLDA CALC-SCNC: -6.7 MMOL/L — LOW (ref -2–2)
BASE EXCESS BLDCOV CALC-SCNC: -9.2 MMOL/L — LOW (ref -6–0.3)
BLOOD GAS COMMENTS ARTERIAL: SIGNIFICANT CHANGE UP
BLOOD GAS COMMENTS, CORD VENOUS: SIGNIFICANT CHANGE UP
BUN SERPL-MCNC: 15 MG/DL — SIGNIFICANT CHANGE UP (ref 7–18)
C PEPTIDE SERPL-MCNC: 0.2 NG/ML — LOW (ref 0.9–7.1)
CALCIUM SERPL-MCNC: 6.7 MG/DL — LOW (ref 8.4–10.5)
CHLORIDE SERPL-SCNC: 112 MMOL/L — HIGH (ref 96–108)
CO2 SERPL-SCNC: 20 MMOL/L — LOW (ref 22–31)
CREAT SERPL-MCNC: 0.74 MG/DL — SIGNIFICANT CHANGE UP (ref 0.5–1.3)
FIO2 CORD, VENOUS: 40 — SIGNIFICANT CHANGE UP
GAS PNL BLDCOV: 7.32 — SIGNIFICANT CHANGE UP (ref 7.25–7.45)
GLUCOSE BLDC GLUCOMTR-MCNC: 166 MG/DL — HIGH (ref 70–99)
GLUCOSE BLDC GLUCOMTR-MCNC: 216 MG/DL — HIGH (ref 70–99)
GLUCOSE BLDC GLUCOMTR-MCNC: 257 MG/DL — HIGH (ref 70–99)
GLUCOSE BLDC GLUCOMTR-MCNC: 289 MG/DL — HIGH (ref 70–99)
GLUCOSE BLDC GLUCOMTR-MCNC: 301 MG/DL — HIGH (ref 70–99)
GLUCOSE SERPL-MCNC: 172 MG/DL — HIGH (ref 70–99)
HBA1C BLD-MCNC: 7.6 % — HIGH (ref 4–5.6)
HBA1C BLD-MCNC: 8.3 % — HIGH (ref 4–5.6)
HCO3 BLDA-SCNC: 19 MMOL/L — LOW (ref 23–27)
HCO3 BLDCOV-SCNC: 16 MMOL/L — LOW (ref 17–25)
HCT VFR BLD CALC: 22.5 % — LOW (ref 34.5–45)
HCT VFR BLD CALC: 24.3 % — LOW (ref 34.5–45)
HCT VFR BLD CALC: 33.7 % — LOW (ref 34.5–45)
HGB BLD-MCNC: 11.4 G/DL — LOW (ref 11.5–15.5)
HGB BLD-MCNC: 7.6 G/DL — LOW (ref 11.5–15.5)
HGB BLD-MCNC: 8.2 G/DL — LOW (ref 11.5–15.5)
HOROWITZ INDEX BLDA+IHG-RTO: 40 — SIGNIFICANT CHANGE UP
INR BLD: 1.26 RATIO — HIGH (ref 0.88–1.16)
INSULIN SERPL-MCNC: 23 UU/ML — HIGH (ref 3–17)
LACTATE SERPL-SCNC: 1.3 MMOL/L — SIGNIFICANT CHANGE UP (ref 0.7–2)
LDH SERPL L TO P-CCNC: 215 U/L — SIGNIFICANT CHANGE UP (ref 120–225)
MCHC RBC-ENTMCNC: 28.4 PG — SIGNIFICANT CHANGE UP (ref 27–34)
MCHC RBC-ENTMCNC: 28.9 PG — SIGNIFICANT CHANGE UP (ref 27–34)
MCHC RBC-ENTMCNC: 29.2 PG — SIGNIFICANT CHANGE UP (ref 27–34)
MCHC RBC-ENTMCNC: 33.6 GM/DL — SIGNIFICANT CHANGE UP (ref 32–36)
MCHC RBC-ENTMCNC: 33.7 GM/DL — SIGNIFICANT CHANGE UP (ref 32–36)
MCHC RBC-ENTMCNC: 34 GM/DL — SIGNIFICANT CHANGE UP (ref 32–36)
MCV RBC AUTO: 84.5 FL — SIGNIFICANT CHANGE UP (ref 80–100)
MCV RBC AUTO: 85.9 FL — SIGNIFICANT CHANGE UP (ref 80–100)
MCV RBC AUTO: 86 FL — SIGNIFICANT CHANGE UP (ref 80–100)
PCO2 BLDA: 40 MMHG — SIGNIFICANT CHANGE UP (ref 32–46)
PCO2 BLDCOV: 31 MMHG — SIGNIFICANT CHANGE UP (ref 27–49)
PH BLDA: 7.29 — LOW (ref 7.35–7.45)
PLATELET # BLD AUTO: 200 K/UL — SIGNIFICANT CHANGE UP (ref 150–400)
PLATELET # BLD AUTO: 217 K/UL — SIGNIFICANT CHANGE UP (ref 150–400)
PLATELET # BLD AUTO: 219 K/UL — SIGNIFICANT CHANGE UP (ref 150–400)
PO2 BLDA: 108 MMHG — SIGNIFICANT CHANGE UP (ref 74–108)
PO2 BLDCOA: <43 MMHG — SIGNIFICANT CHANGE UP (ref 17–41)
POTASSIUM SERPL-MCNC: 3.4 MMOL/L — LOW (ref 3.5–5.3)
POTASSIUM SERPL-SCNC: 3.4 MMOL/L — LOW (ref 3.5–5.3)
PROTHROM AB SERPL-ACNC: 13.8 SEC — HIGH (ref 9.8–12.7)
RAPID RVP RESULT: SIGNIFICANT CHANGE UP
RBC # BLD: 2.62 M/UL — LOW (ref 3.8–5.2)
RBC # BLD: 2.83 M/UL — LOW (ref 3.8–5.2)
RBC # BLD: 2.88 M/UL — LOW (ref 3.8–5.2)
RBC # BLD: 3.92 M/UL — SIGNIFICANT CHANGE UP (ref 3.8–5.2)
RBC # FLD: 12.7 % — SIGNIFICANT CHANGE UP (ref 10.3–14.5)
RBC # FLD: 12.8 % — SIGNIFICANT CHANGE UP (ref 10.3–14.5)
RBC # FLD: 13 % — SIGNIFICANT CHANGE UP (ref 10.3–14.5)
RETICS #: 55.5 K/UL — SIGNIFICANT CHANGE UP (ref 25–125)
RETICS/RBC NFR: 2 % — SIGNIFICANT CHANGE UP (ref 0.5–2.5)
SAO2 % BLDA: 98 % — HIGH (ref 92–96)
SAO2 % BLDCOV: 77 % — HIGH (ref 20–75)
SODIUM SERPL-SCNC: 141 MMOL/L — SIGNIFICANT CHANGE UP (ref 135–145)
WBC # BLD: 12 K/UL — HIGH (ref 3.8–10.5)
WBC # BLD: 12.4 K/UL — HIGH (ref 3.8–10.5)
WBC # BLD: 13.4 K/UL — HIGH (ref 3.8–10.5)
WBC # FLD AUTO: 12 K/UL — HIGH (ref 3.8–10.5)
WBC # FLD AUTO: 12.4 K/UL — HIGH (ref 3.8–10.5)
WBC # FLD AUTO: 13.4 K/UL — HIGH (ref 3.8–10.5)

## 2018-09-09 PROCEDURE — 71045 X-RAY EXAM CHEST 1 VIEW: CPT | Mod: 26,77

## 2018-09-09 PROCEDURE — 71045 X-RAY EXAM CHEST 1 VIEW: CPT | Mod: 26

## 2018-09-09 PROCEDURE — 74176 CT ABD & PELVIS W/O CONTRAST: CPT | Mod: 26

## 2018-09-09 RX ORDER — CEFTRIAXONE 500 MG/1
INJECTION, POWDER, FOR SOLUTION INTRAMUSCULAR; INTRAVENOUS
Qty: 0 | Refills: 0 | Status: DISCONTINUED | OUTPATIENT
Start: 2018-09-09 | End: 2018-09-09

## 2018-09-09 RX ORDER — POTASSIUM CHLORIDE 20 MEQ
10 PACKET (EA) ORAL
Qty: 0 | Refills: 0 | Status: DISCONTINUED | OUTPATIENT
Start: 2018-09-09 | End: 2018-09-09

## 2018-09-09 RX ORDER — INSULIN GLARGINE 100 [IU]/ML
10 INJECTION, SOLUTION SUBCUTANEOUS AT BEDTIME
Qty: 0 | Refills: 0 | Status: DISCONTINUED | OUTPATIENT
Start: 2018-09-09 | End: 2018-09-13

## 2018-09-09 RX ORDER — CEFEPIME 1 G/1
1000 INJECTION, POWDER, FOR SOLUTION INTRAMUSCULAR; INTRAVENOUS ONCE
Qty: 0 | Refills: 0 | Status: DISCONTINUED | OUTPATIENT
Start: 2018-09-09 | End: 2018-09-09

## 2018-09-09 RX ORDER — PHENYLEPHRINE HYDROCHLORIDE 10 MG/ML
0.5 INJECTION INTRAVENOUS
Qty: 160 | Refills: 0 | Status: DISCONTINUED | OUTPATIENT
Start: 2018-09-09 | End: 2018-09-09

## 2018-09-09 RX ORDER — PANTOPRAZOLE SODIUM 20 MG/1
40 TABLET, DELAYED RELEASE ORAL
Qty: 0 | Refills: 0 | Status: DISCONTINUED | OUTPATIENT
Start: 2018-09-09 | End: 2018-09-17

## 2018-09-09 RX ORDER — CEFEPIME 1 G/1
2000 INJECTION, POWDER, FOR SOLUTION INTRAMUSCULAR; INTRAVENOUS EVERY 8 HOURS
Qty: 0 | Refills: 0 | Status: DISCONTINUED | OUTPATIENT
Start: 2018-09-10 | End: 2018-09-19

## 2018-09-09 RX ORDER — CEFEPIME 1 G/1
INJECTION, POWDER, FOR SOLUTION INTRAMUSCULAR; INTRAVENOUS
Qty: 0 | Refills: 0 | Status: DISCONTINUED | OUTPATIENT
Start: 2018-09-09 | End: 2018-09-19

## 2018-09-09 RX ORDER — POTASSIUM CHLORIDE 20 MEQ
10 PACKET (EA) ORAL
Qty: 0 | Refills: 0 | Status: COMPLETED | OUTPATIENT
Start: 2018-09-09 | End: 2018-09-09

## 2018-09-09 RX ORDER — NOREPINEPHRINE BITARTRATE/D5W 8 MG/250ML
0.5 PLASTIC BAG, INJECTION (ML) INTRAVENOUS
Qty: 16 | Refills: 0 | Status: DISCONTINUED | OUTPATIENT
Start: 2018-09-09 | End: 2018-09-11

## 2018-09-09 RX ORDER — SODIUM CHLORIDE 9 MG/ML
1000 INJECTION INTRAMUSCULAR; INTRAVENOUS; SUBCUTANEOUS ONCE
Qty: 0 | Refills: 0 | Status: COMPLETED | OUTPATIENT
Start: 2018-09-09 | End: 2018-09-09

## 2018-09-09 RX ORDER — CEFTRIAXONE 500 MG/1
1 INJECTION, POWDER, FOR SOLUTION INTRAMUSCULAR; INTRAVENOUS ONCE
Qty: 0 | Refills: 0 | Status: COMPLETED | OUTPATIENT
Start: 2018-09-09 | End: 2018-09-09

## 2018-09-09 RX ORDER — CEFEPIME 1 G/1
2000 INJECTION, POWDER, FOR SOLUTION INTRAMUSCULAR; INTRAVENOUS ONCE
Qty: 0 | Refills: 0 | Status: COMPLETED | OUTPATIENT
Start: 2018-09-09 | End: 2018-09-09

## 2018-09-09 RX ORDER — VANCOMYCIN HCL 1 G
1000 VIAL (EA) INTRAVENOUS EVERY 12 HOURS
Qty: 0 | Refills: 0 | Status: DISCONTINUED | OUTPATIENT
Start: 2018-09-09 | End: 2018-09-09

## 2018-09-09 RX ORDER — CEFEPIME 1 G/1
INJECTION, POWDER, FOR SOLUTION INTRAMUSCULAR; INTRAVENOUS
Qty: 0 | Refills: 0 | Status: DISCONTINUED | OUTPATIENT
Start: 2018-09-09 | End: 2018-09-09

## 2018-09-09 RX ORDER — FERROUS SULFATE 325(65) MG
300 TABLET ORAL DAILY
Qty: 0 | Refills: 0 | Status: DISCONTINUED | OUTPATIENT
Start: 2018-09-09 | End: 2018-09-20

## 2018-09-09 RX ORDER — INSULIN LISPRO 100/ML
VIAL (ML) SUBCUTANEOUS EVERY 6 HOURS
Qty: 0 | Refills: 0 | Status: DISCONTINUED | OUTPATIENT
Start: 2018-09-09 | End: 2018-09-12

## 2018-09-09 RX ORDER — VANCOMYCIN HCL 1 G
1000 VIAL (EA) INTRAVENOUS EVERY 12 HOURS
Qty: 0 | Refills: 0 | Status: DISCONTINUED | OUTPATIENT
Start: 2018-09-09 | End: 2018-09-11

## 2018-09-09 RX ORDER — SODIUM CHLORIDE 9 MG/ML
500 INJECTION, SOLUTION INTRAVENOUS ONCE
Qty: 0 | Refills: 0 | Status: COMPLETED | OUTPATIENT
Start: 2018-09-09 | End: 2018-09-09

## 2018-09-09 RX ADMIN — SODIUM CHLORIDE 1000 MILLILITER(S): 9 INJECTION, SOLUTION INTRAVENOUS at 15:23

## 2018-09-09 RX ADMIN — Medication 31.22 MICROGRAM(S)/KG/MIN: at 02:02

## 2018-09-09 RX ADMIN — SIMVASTATIN 20 MILLIGRAM(S): 20 TABLET, FILM COATED ORAL at 22:18

## 2018-09-09 RX ADMIN — Medication 6: at 13:46

## 2018-09-09 RX ADMIN — CEFEPIME 100 MILLIGRAM(S): 1 INJECTION, POWDER, FOR SOLUTION INTRAMUSCULAR; INTRAVENOUS at 22:18

## 2018-09-09 RX ADMIN — Medication 2: at 18:00

## 2018-09-09 RX ADMIN — SODIUM CHLORIDE 100 MILLILITER(S): 9 INJECTION, SOLUTION INTRAVENOUS at 12:06

## 2018-09-09 RX ADMIN — Medication 110 MILLIGRAM(S): at 12:06

## 2018-09-09 RX ADMIN — Medication 250 MILLIGRAM(S): at 00:45

## 2018-09-09 RX ADMIN — PIPERACILLIN AND TAZOBACTAM 25 GRAM(S): 4; .5 INJECTION, POWDER, LYOPHILIZED, FOR SOLUTION INTRAVENOUS at 07:48

## 2018-09-09 RX ADMIN — Medication 3: at 12:06

## 2018-09-09 RX ADMIN — INSULIN GLARGINE 10 UNIT(S): 100 INJECTION, SOLUTION SUBCUTANEOUS at 22:28

## 2018-09-09 RX ADMIN — Medication 250 MILLIGRAM(S): at 22:16

## 2018-09-09 RX ADMIN — PIPERACILLIN AND TAZOBACTAM 25 GRAM(S): 4; .5 INJECTION, POWDER, LYOPHILIZED, FOR SOLUTION INTRAVENOUS at 00:46

## 2018-09-09 RX ADMIN — Medication 100 MILLIEQUIVALENT(S): at 08:17

## 2018-09-09 RX ADMIN — Medication 300 MILLIGRAM(S): at 12:21

## 2018-09-09 RX ADMIN — Medication 100 MILLIEQUIVALENT(S): at 07:03

## 2018-09-09 RX ADMIN — Medication 2 MILLIGRAM(S): at 00:32

## 2018-09-09 RX ADMIN — Medication 100 MILLIEQUIVALENT(S): at 07:47

## 2018-09-09 RX ADMIN — Medication 110 MILLIGRAM(S): at 18:00

## 2018-09-09 RX ADMIN — Medication 650 MILLIGRAM(S): at 16:05

## 2018-09-09 RX ADMIN — SODIUM CHLORIDE 4000 MILLILITER(S): 9 INJECTION INTRAMUSCULAR; INTRAVENOUS; SUBCUTANEOUS at 00:47

## 2018-09-09 RX ADMIN — Medication 650 MILLIGRAM(S): at 15:31

## 2018-09-09 RX ADMIN — Medication 2: at 06:27

## 2018-09-09 RX ADMIN — CEFTRIAXONE 100 GRAM(S): 500 INJECTION, POWDER, FOR SOLUTION INTRAMUSCULAR; INTRAVENOUS at 12:20

## 2018-09-09 RX ADMIN — FENTANYL CITRATE 3.33 MICROGRAM(S)/KG/HR: 50 INJECTION INTRAVENOUS at 11:06

## 2018-09-09 RX ADMIN — PANTOPRAZOLE SODIUM 40 MILLIGRAM(S): 20 TABLET, DELAYED RELEASE ORAL at 18:01

## 2018-09-09 RX ADMIN — SODIUM CHLORIDE 100 MILLILITER(S): 9 INJECTION, SOLUTION INTRAVENOUS at 02:02

## 2018-09-09 RX ADMIN — PANTOPRAZOLE SODIUM 40 MILLIGRAM(S): 20 TABLET, DELAYED RELEASE ORAL at 06:03

## 2018-09-09 NOTE — PROGRESS NOTE ADULT - PROBLEM SELECTOR PLAN 1
Pt developed hypotension and found to have Hb is dropping to 7.6 from 11.  s/p 2 Bolus   2 Prbcs  cbc q4  CT abdomen: no acute GI pathology  central line placed  c/w pressors Pt developed hypotension and found to have Hb is dropping to 7.6 from 11.  s/p 2 Bolus   2 Prbcs  cbc q4  CT abdomen: no acute GI pathology  central line placed  c/w pressors  c/w IVF 2/2 PNA  c/w pressors  c/w IVF

## 2018-09-09 NOTE — CONSULT NOTE ADULT - ASSESSMENT
58 y/o female from [facility] rehab with PMH of HTN, DM and ?cardiac disease (family not sure about exact diagnosis) and PSH of recent C spine surgery (1 month back at [place] for ?nerve compression) sent to ED for unresponsiveness. As per EMS, patient was thought to have low blood sugars. Per rehab papers, patient was noted to have blood sugars of 49. Pt intubated/ sedated and on pressors. found to have hemorrhagic shock.

## 2018-09-09 NOTE — PROGRESS NOTE ADULT - ASSESSMENT
58 y/o female from [facility] rehab with PMH of HTN, DM and ?cardiac disease (family not sure about exact diagnosis) and PSH of recent C spine surgery (1 month back at [place] for ?nerve compression) sent to ED for unresponsiveness. Likely secondary to hypoglycemia. Was intubated in ED and admitted to ICU.

## 2018-09-09 NOTE — CONSULT NOTE ADULT - SUBJECTIVE AND OBJECTIVE BOX
Patient is a 57y old  Female who presents with a chief complaint of unresponsiveness (09 Sep 2018 04:51)      HPI:  58 y/o female from [facility] rehab with PMH of HTN, DM and ?cardiac disease (family not sure about exact diagnosis) and PSH of recent C spine surgery (1 month back at [place] for ?nerve compression) sent to ED for unresponsiveness. As per EMS, patient was thought to have low blood sugars so was treated for that without improvement in mental status. Fs in . CT head was unremarkable. CXR clear. Utox only positive for benzodiazepine ([patient is on Valium 5 mg q12). Patient was intubated by ED attending and admitted to ICU for further management.     Per rehab papers, patient was noted to have blood sugars of 49 last night at 9 pm, was given 2 mg of glucagon IM. AM fs at rehab was noted to be 73 and patient was minimally responsive so EMS was called. Spoke to Dr. Welch who takes care of her at rehab. Staff at rehab found patient's home medications at bedside, seems patient took her own Insulin on top of the Insulin given bu rehab staff , leading to hypoglycemia and unresponsiveness. Patient was last seen normal last night before dinner. (08 Sep 2018 11:48) Pt intubated/ sedated and on pressors. found to have hemorrhagic shock.       PAST MEDICAL & SURGICAL HISTORY:  Cardiac disease  DM (diabetes mellitus)  HTN (hypertension)  H/O cervical spine surgery         MEDICATIONS  (STANDING):  dextrose 5% + sodium chloride 0.9%. 1000 milliLiter(s) (100 mL/Hr) IV Continuous <Continuous>  dextrose 5%. 1000 milliLiter(s) (50 mL/Hr) IV Continuous <Continuous>  dextrose 50% Injectable 12.5 Gram(s) IV Push once  dextrose 50% Injectable 25 Gram(s) IV Push once  dextrose 50% Injectable 25 Gram(s) IV Push once  fentaNYL   Infusion. 0.5 MICROgram(s)/kG/Hr (3.33 mL/Hr) IV Continuous <Continuous>  ferrous    sulfate 325 milliGRAM(s) Oral daily  insulin glargine Injectable (LANTUS) 10 Unit(s) SubCutaneous at bedtime  insulin lispro (HumaLOG) corrective regimen sliding scale   SubCutaneous every 6 hours  norepinephrine Infusion 0.5 MICROgram(s)/kG/Min (31.219 mL/Hr) IV Continuous <Continuous>  pantoprazole  Injectable 40 milliGRAM(s) IV Push two times a day  piperacillin/tazobactam IVPB. 3.375 Gram(s) IV Intermittent every 8 hours  simvastatin 20 milliGRAM(s) Oral at bedtime  vancomycin  IVPB 1000 milliGRAM(s) IV Intermittent every 12 hours    MEDICATIONS  (PRN):  acetaminophen    Suspension .. 650 milliGRAM(s) Oral every 6 hours PRN Temp greater or equal to 38C (100.4F), Mild Pain (1 - 3)  dextrose 40% Gel 15 Gram(s) Oral once PRN Blood Glucose LESS THAN 70 milliGRAM(s)/deciliter  glucagon  Injectable 1 milliGRAM(s) IntraMuscular once PRN Glucose LESS THAN 70 milligrams/deciliter  LORazepam   Injectable 2 milliGRAM(s) IV Push every 4 hours PRN Agitation  morphine  - Injectable 2 milliGRAM(s) IV Push every 4 hours PRN Severe Pain (7 - 10)      FAMILY HISTORY:  Family history unknown      SOCIAL HISTORY:      REVIEW OF SYSTEMS:  CONSTITUTIONAL: No fever, weight loss, or fatigue  EYES: No eye pain, visual disturbances, or discharge  ENT:  No difficulty hearing, tinnitus, vertigo; No sinus or throat pain  NECK: No pain or stiffness  RESPIRATORY: No cough, wheezing, chills or hemoptysis; No Shortness of Breath  CARDIOVASCULAR: No chest pain, palpitations, passing out, dizziness, or leg swelling  GASTROINTESTINAL: No abdominal or epigastric pain. No nausea, vomiting, or hematemesis; No diarrhea or constipation. No melena or hematochezia.  GENITOURINARY: No dysuria, frequency, hematuria, or incontinence  NEUROLOGICAL: No headaches, memory loss, loss of strength, numbness, or tremors  SKIN: No itching, burning, rashes, or lesions   LYMPH Nodes: No enlarged glands  ENDOCRINE: No heat or cold intolerance; No hair loss  MUSCULOSKELETAL: No joint pain or swelling; No muscle, back, or extremity pain  PSYCHIATRIC: No depression, anxiety, mood swings, or difficulty sleeping  HEME/LYMPH: No easy bruising, or bleeding gums  ALLERGY AND IMMUNOLOGIC: No hives or eczema	        Vital Signs Last 24 Hrs  T(C): 37.1 (09 Sep 2018 07:43), Max: 39.1 (08 Sep 2018 22:00)  T(F): 98.8 (09 Sep 2018 07:43), Max: 102.3 (08 Sep 2018 22:00)  HR: 91 (09 Sep 2018 11:00) (83 - 129)  BP: 115/65 (09 Sep 2018 11:00) (61/39 - 183/155)  BP(mean): 76 (09 Sep 2018 11:00) (44 - 162)  RR: 12 (09 Sep 2018 11:00) (11 - 26)  SpO2: 100% (09 Sep 2018 11:00) (93% - 100%)      Constitutional:    HEENT: nad    Neck:  No JVD, bruits or thyromegaly    Respiratory:  Clear without rales or rhonchi    Cardiovascular:  RR without murmur, rub or gallop.    Gastrointestinal: Soft without hepatosplenomegaly.    Extremities: without cyanosis, clubbing or edema.    Neurological:  Oriented   x  0    . No gross sensory or motor defects.        LABS:                        11.4   13.4  )-----------( 219      ( 09 Sep 2018 09:38 )             33.7     09-    141  |  112<H>  |  15  ----------------------------<  172<H>  3.4<L>   |  20<L>  |  0.74    Ca    6.7<L>      09 Sep 2018 00:36    TPro  8.3  /  Alb  3.7  /  TBili  <0.1<L>  /  DBili  x   /  AST  25  /  ALT  44  /  AlkPhos  85  09-08    CARDIAC MARKERS ( 08 Sep 2018 08:27 )  <0.015 ng/mL / x     / x     / x     / x          PT/INR - ( 09 Sep 2018 00:36 )   PT: 13.8 sec;   INR: 1.26 ratio         PTT - ( 08 Sep 2018 08:27 )  PTT:34.2 sec  Urinalysis Basic - ( 08 Sep 2018 15:09 )    Color: Yellow / Appearance: Clear / S.010 / pH: x  Gluc: x / Ketone: Trace  / Bili: Negative / Urobili: Negative   Blood: x / Protein: 100 / Nitrite: Negative   Leuk Esterase: Trace / RBC: 0-2 /HPF / WBC 0-2 /HPF   Sq Epi: x / Non Sq Epi: Few /HPF / Bacteria: Negative /HPF      CAPILLARY BLOOD GLUCOSE      POCT Blood Glucose.: 289 mg/dL (09 Sep 2018 06:24)  POCT Blood Glucose.: 198 mg/dL (08 Sep 2018 23:46)  POCT Blood Glucose.: 168 mg/dL (08 Sep 2018 22:29)  POCT Blood Glucose.: 163 mg/dL (08 Sep 2018 20:12)  POCT Blood Glucose.: 142 mg/dL (08 Sep 2018 18:59)  POCT Blood Glucose.: 139 mg/dL (08 Sep 2018 18:21)  POCT Blood Glucose.: 146 mg/dL (08 Sep 2018 17:20)  POCT Blood Glucose.: 65 mg/dL (08 Sep 2018 16:10)  POCT Blood Glucose.: 78 mg/dL (08 Sep 2018 15:24)  POCT Blood Glucose.: 74 mg/dL (08 Sep 2018 14:05)      RADIOLOGY & ADDITIONAL STUDIES:

## 2018-09-09 NOTE — CONSULT NOTE ADULT - ASSESSMENT
A 56 yo female who was [facility] for [facility] hypoglycemia and unresponsiveness. As per EMS, patient was thought to have low blood sugars so was treated for that without improvement in mental status. Hence, intubated by ED attending and admitted to ICU for further management. She found to have Leukocytosis, fever and B/L extensive Lower lobe infiltrate. She has started on Ceftriaxone and Doxycyline and The ID consult requested to assist with further evaluation and antibiotic management.    # Septic shock- on pressor  # B/L Lower lobe Pneumonia    Would recommend:    1. Obtain deep suctioned sputum culture  2. Obtain MRSA/MSSA PCR  3. Change Ceftriaxone to Cefepime to cover HCAP and Add Vancomycin until work up is done  4. Aspiration precaution  5. Management of hypoglycemia as per ICU  protocol  6. Management of Vent as per ICU    d/w     will follow the patient with you and make further recommendation based on the clinical course and Lab results  Thank you for the opportunity to participate in Ms. LINDER's care A 58 yo female who [facility] and unresponsiveness. As per EMS, patient was thought to have low blood sugars so was treated for that without improvement in mental status. Hence, intubated by ED attending and admitted to ICU for further management. She found to have Leukocytosis, fever and B/L extensive Lower lobe infiltrate. She has started on Ceftriaxone and Doxycyline and The ID consult requested to assist with further evaluation and antibiotic management.    # Septic shock- on pressor  # B/L Lower lobe Pneumonia    Would recommend:    1. Obtain deep suctioned sputum culture  2. Obtain MRSA/MSSA PCR  3. Change Ceftriaxone to Cefepime to cover HCAP and Add Vancomycin until work up is done  4. Aspiration precaution  5. Management of hypoglycemia as per ICU  protocol  6. Management of Vent as per ICU    d/w ICU team    will follow the patient with you and make further recommendation based on the clinical course and Lab results  Thank you for the opportunity to participate in Ms. LINDER's care

## 2018-09-09 NOTE — CONSULT NOTE ADULT - SUBJECTIVE AND OBJECTIVE BOX
A 56 yo female who was sent in to the ER from [facility] for evaluation of hypoglycemia and unresponsiveness. As per EMS, patient was thought to have low blood sugars so was treated for that without improvement in mental status. Hence, intubated by ED attending and admitted to ICU for further management. She found to have Leukocytosis, fever and B/L extensive Lower lobe infiltrate. She has started on Ceftriaxone and Doxycyline and The ID consult requested to assist with further evaluation and antibiotic management.      REVIEW OF SYSTEMS: Unable to obtain since intubated and not responsive      PAST MEDICAL & SURGICAL HISTORY:  Cardiac disease  DM (diabetes mellitus)  HTN (hypertension)  H/O cervical spine surgery      SOCIAL HISTORY  Alcohol: Does not drink  Tobacco: Does not smoke  Illicit substance use: None      FAMILY HISTORY: Non contributory to the present illness      ALLERGIES: NKDA        ICU Vital Signs Last 24 Hrs  T(C): 38.1 (09 Sep 2018 15:49), Max: 39.1 (08 Sep 2018 22:00)  T(F): 100.5 (09 Sep 2018 15:49), Max: 102.3 (08 Sep 2018 22:00)  HR: 100 (09 Sep 2018 18:00) (83 - 138)  BP: 116/65 (09 Sep 2018 18:00) (61/39 - 169/92)  BP(mean): 77 (09 Sep 2018 18:00) (44 - 109)  ABP: --  ABP(mean): --  RR: 12 (09 Sep 2018 18:00) (11 - 26)  SpO2: 100% (09 Sep 2018 18:00) (93% - 100%)        PHYSICAL EXAM:  GENERAL: Intubated  CVS: s1 and s2 present  RESP: Air entry B/L  GI: Abdomen non distended and Nontender  EXT: No pedal edema   CNS: Intubated        LABS:                        11.4   13.4  )-----------( 219      ( 09 Sep 2018 09:38 )             33.7         -    141  |  112<H>  |  15  ----------------------------<  172<H>  3.4<L>   |  20<L>  |  0.74    Ca    6.7<L>      09 Sep 2018 00:36    TPro  8.3  /  Alb  3.7  /  TBili  <0.1<L>  /  DBili  x   /  AST  25  /  ALT  44  /  AlkPhos  85  -08    PT/INR - ( 09 Sep 2018 00:36 )   PT: 13.8 sec;   INR: 1.26 ratio         PTT - ( 08 Sep 2018 08:27 )  PTT:34.2 sec  Urinalysis Basic - ( 08 Sep 2018 15:09 )    Color: Yellow / Appearance: Clear / S.010 / pH: x  Gluc: x / Ketone: Trace  / Bili: Negative / Urobili: Negative   Blood: x / Protein: 100 / Nitrite: Negative   Leuk Esterase: Trace / RBC: 0-2 /HPF / WBC 0-2 /HPF   Sq Epi: x / Non Sq Epi: Few /HPF / Bacteria: Negative /HPF      CAPILLARY BLOOD GLUCOSE      POCT Blood Glucose.: 166 mg/dL (09 Sep 2018 17:59)  POCT Blood Glucose.: 257 mg/dL (09 Sep 2018 13:45)  POCT Blood Glucose.: 301 mg/dL (09 Sep 2018 12:04)  POCT Blood Glucose.: 289 mg/dL (09 Sep 2018 06:24)  POCT Blood Glucose.: 198 mg/dL (08 Sep 2018 23:46)  POCT Blood Glucose.: 168 mg/dL (08 Sep 2018 22:29)  POCT Blood Glucose.: 163 mg/dL (08 Sep 2018 20:12)  POCT Blood Glucose.: 142 mg/dL (08 Sep 2018 18:59)    ABG - ( 09 Sep 2018 04:43 )  pH, Arterial: 7.29  pH, Blood: x     /  pCO2: 40    /  pO2: 108   / HCO3: 19    / Base Excess: -6.7  /  SaO2: 98            MEDICATIONS  (STANDING):  cefTRIAXone   IVPB      doxycycline IVPB 100 milliGRAM(s) IV Intermittent every 12 hours  doxycycline IVPB      fentaNYL   Infusion. 0.5 MICROgram(s)/kG/Hr (3.33 mL/Hr) IV Continuous <Continuous>  ferrous    sulfate Liquid 300 milliGRAM(s) Oral daily  insulin glargine Injectable (LANTUS) 10 Unit(s) SubCutaneous at bedtime  insulin lispro (HumaLOG) corrective regimen sliding scale   SubCutaneous every 6 hours  norepinephrine Infusion 0.5 MICROgram(s)/kG/Min (31.219 mL/Hr) IV Continuous <Continuous>  pantoprazole  Injectable 40 milliGRAM(s) IV Push two times a day  simvastatin 20 milliGRAM(s) Oral at bedtime    MEDICATIONS  (PRN):  acetaminophen    Suspension .. 650 milliGRAM(s) Oral every 6 hours PRN Temp greater or equal to 38C (100.4F), Mild Pain (1 - 3)  dextrose 40% Gel 15 Gram(s) Oral once PRN Blood Glucose LESS THAN 70 milliGRAM(s)/deciliter  glucagon  Injectable 1 milliGRAM(s) IntraMuscular once PRN Glucose LESS THAN 70 milligrams/deciliter  LORazepam   Injectable 2 milliGRAM(s) IV Push every 4 hours PRN Agitation  morphine  - Injectable 2 milliGRAM(s) IV Push every 4 hours PRN Severe Pain (7 - 10)      RADIOLOGY & ADDITIONAL TESTS:    18 : Xray Chest 1 View- PORTABLE-Routine (18 @ 10:01) Mild pulmonary venous congestion, increased since the prior study. No pleural effusions . Hazy opacities in the lower lobes could   represent atelectasis or infiltrates.      18 : CT Abdomen and Pelvis No Cont (18 @ 03:24) No retroperitoneal hemorrhage. No bowel obstruction. Extensive bilateral lower lobe consolidation. Differential diagnosis   includes multifocal pneumonia, aspiration pneumonia, alveolar hemorrhage. Mild pulmonary venous congestion.      18 : CT Head No Cont (18 @ 07:55) There is no evidence of hydrocephalus, mass effect or any sizable extra-axial collection. Due to motion artifact, subtle abnormalities A 56 yo female who [facility] of hypoglycemia and unresponsiveness. As per EMS, patient was thought to have low blood sugars so was treated for that without improvement in mental status. Hence, intubated by ED attending and admitted to ICU for further management. She found to have Leukocytosis, fever and B/L extensive Lower lobe infiltrate. She has started on Ceftriaxone and Doxycyline and The ID consult requested to assist with further evaluation and antibiotic management.      REVIEW OF SYSTEMS: Unable to obtain since intubated and not responsive      PAST MEDICAL & SURGICAL HISTORY:  Cardiac disease  DM (diabetes mellitus)  HTN (hypertension)  H/O cervical spine surgery      SOCIAL HISTORY  Alcohol: Does not drink  Tobacco: Does not smoke  Illicit substance use: None      FAMILY HISTORY: Non contributory to the present illness      ALLERGIES: NKDA        ICU Vital Signs Last 24 Hrs  T(C): 38.1 (09 Sep 2018 15:49), Max: 39.1 (08 Sep 2018 22:00)  T(F): 100.5 (09 Sep 2018 15:49), Max: 102.3 (08 Sep 2018 22:00)  HR: 100 (09 Sep 2018 18:00) (83 - 138)  BP: 116/65 (09 Sep 2018 18:00) (61/39 - 169/92)  BP(mean): 77 (09 Sep 2018 18:00) (44 - 109)  ABP: --  ABP(mean): --  RR: 12 (09 Sep 2018 18:00) (11 - 26)  SpO2: 100% (09 Sep 2018 18:00) (93% - 100%)        PHYSICAL EXAM:  GENERAL: Intubated/vented  CVS: s1 and s2 present  RESP: Air entry B/L  GI: Abdomen non distended and Nontender  EXT: No pedal edema   CNS: Intubated /vented        LABS:                        11.4   13.4  )-----------( 219      ( 09 Sep 2018 09:38 )             33.7         -    141  |  112<H>  |  15  ----------------------------<  172<H>  3.4<L>   |  20<L>  |  0.74    Ca    6.7<L>      09 Sep 2018 00:36    TPro  8.3  /  Alb  3.7  /  TBili  <0.1<L>  /  DBili  x   /  AST  25  /  ALT  44  /  AlkPhos  85  -08    PT/INR - ( 09 Sep 2018 00:36 )   PT: 13.8 sec;   INR: 1.26 ratio         PTT - ( 08 Sep 2018 08:27 )  PTT:34.2 sec  Urinalysis Basic - ( 08 Sep 2018 15:09 )    Color: Yellow / Appearance: Clear / S.010 / pH: x  Gluc: x / Ketone: Trace  / Bili: Negative / Urobili: Negative   Blood: x / Protein: 100 / Nitrite: Negative   Leuk Esterase: Trace / RBC: 0-2 /HPF / WBC 0-2 /HPF   Sq Epi: x / Non Sq Epi: Few /HPF / Bacteria: Negative /HPF      CAPILLARY BLOOD GLUCOSE      POCT Blood Glucose.: 166 mg/dL (09 Sep 2018 17:59)  POCT Blood Glucose.: 257 mg/dL (09 Sep 2018 13:45)  POCT Blood Glucose.: 301 mg/dL (09 Sep 2018 12:04)  POCT Blood Glucose.: 289 mg/dL (09 Sep 2018 06:24)  POCT Blood Glucose.: 198 mg/dL (08 Sep 2018 23:46)  POCT Blood Glucose.: 168 mg/dL (08 Sep 2018 22:29)  POCT Blood Glucose.: 163 mg/dL (08 Sep 2018 20:12)  POCT Blood Glucose.: 142 mg/dL (08 Sep 2018 18:59)    ABG - ( 09 Sep 2018 04:43 )  pH, Arterial: 7.29  pH, Blood: x     /  pCO2: 40    /  pO2: 108   / HCO3: 19    / Base Excess: -6.7  /  SaO2: 98            MEDICATIONS  (STANDING):  cefTRIAXone   IVPB      doxycycline IVPB 100 milliGRAM(s) IV Intermittent every 12 hours  doxycycline IVPB      fentaNYL   Infusion. 0.5 MICROgram(s)/kG/Hr (3.33 mL/Hr) IV Continuous <Continuous>  ferrous    sulfate Liquid 300 milliGRAM(s) Oral daily  insulin glargine Injectable (LANTUS) 10 Unit(s) SubCutaneous at bedtime  insulin lispro (HumaLOG) corrective regimen sliding scale   SubCutaneous every 6 hours  norepinephrine Infusion 0.5 MICROgram(s)/kG/Min (31.219 mL/Hr) IV Continuous <Continuous>  pantoprazole  Injectable 40 milliGRAM(s) IV Push two times a day  simvastatin 20 milliGRAM(s) Oral at bedtime    MEDICATIONS  (PRN):  acetaminophen    Suspension .. 650 milliGRAM(s) Oral every 6 hours PRN Temp greater or equal to 38C (100.4F), Mild Pain (1 - 3)  dextrose 40% Gel 15 Gram(s) Oral once PRN Blood Glucose LESS THAN 70 milliGRAM(s)/deciliter  glucagon  Injectable 1 milliGRAM(s) IntraMuscular once PRN Glucose LESS THAN 70 milligrams/deciliter  LORazepam   Injectable 2 milliGRAM(s) IV Push every 4 hours PRN Agitation  morphine  - Injectable 2 milliGRAM(s) IV Push every 4 hours PRN Severe Pain (7 - 10)      RADIOLOGY & ADDITIONAL TESTS:    18 : Xray Chest 1 View- PORTABLE-Routine (18 @ 10:01) Mild pulmonary venous congestion, increased since the prior study. No pleural effusions . Hazy opacities in the lower lobes could   represent atelectasis or infiltrates.      18 : CT Abdomen and Pelvis No Cont (18 @ 03:24) No retroperitoneal hemorrhage. No bowel obstruction. Extensive bilateral lower lobe consolidation. Differential diagnosis   includes multifocal pneumonia, aspiration pneumonia, alveolar hemorrhage. Mild pulmonary venous congestion.      18 : CT Head No Cont (18 @ 07:55) There is no evidence of hydrocephalus, mass effect or any sizable extra-axial collection. Due to motion artifact, subtle abnormalities

## 2018-09-09 NOTE — PROGRESS NOTE ADULT - PROBLEM SELECTOR PLAN 5
-Home meds includes Basaglar 38 units at bedtime, Humalog 5 units pre meals and correctional scale   -Would only start on Humalog correction scale for now  -Monitor fs every 2 hours for now and adjust regimen as needed  -f/u HbA1c. -Home meds includes Basaglar 38 units at bedtime, Humalog 5 units pre meals and correctional scale   -Would only start on Humalog correction scale for now  -Monitor fs every 2 hours for now and adjust regimen as needed  -Started Lantus 10U HS  -f/u HbA1c.

## 2018-09-09 NOTE — PROCEDURE NOTE - NSPROCDETAILS_GEN_ALL_CORE
sterile dressing applied/ultrasound guidance/guidewire recovered/sterile technique, catheter placed/lumen(s) aspirated and flushed
ultrasound guidance/lumen(s) aspirated and flushed/sterile dressing applied/sterile technique, catheter placed/guidewire recovered

## 2018-09-09 NOTE — PROCEDURE NOTE - NSINDICATIONS_GEN_A_CORE
volume resuscitation/hemodynamic monitoring/emergency venous access/venous access
venous access/volume resuscitation

## 2018-09-09 NOTE — PROGRESS NOTE ADULT - SUBJECTIVE AND OBJECTIVE BOX
JACQUELIN LINDER  MRN-[id number]    Patient is a 57y old  Female who presents with a chief complaint of unresponsiveness (08 Sep 2018 13:02)  patient seen and examined in icu    s remains on vent     ..MEDICATIONS  (STANDING):  cefTRIAXone   IVPB      dextrose 5%. 1000 milliLiter(s) (50 mL/Hr) IV Continuous <Continuous>  dextrose 50% Injectable 12.5 Gram(s) IV Push once  dextrose 50% Injectable 25 Gram(s) IV Push once  dextrose 50% Injectable 25 Gram(s) IV Push once  doxycycline IVPB 100 milliGRAM(s) IV Intermittent every 12 hours  doxycycline IVPB      fentaNYL   Infusion. 0.5 MICROgram(s)/kG/Hr (3.33 mL/Hr) IV Continuous <Continuous>  ferrous    sulfate Liquid 300 milliGRAM(s) Oral daily  insulin glargine Injectable (LANTUS) 10 Unit(s) SubCutaneous at bedtime  insulin lispro (HumaLOG) corrective regimen sliding scale   SubCutaneous every 6 hours  norepinephrine Infusion 0.5 MICROgram(s)/kG/Min (31.219 mL/Hr) IV Continuous <Continuous>  pantoprazole  Injectable 40 milliGRAM(s) IV Push two times a day  simvastatin 20 milliGRAM(s) Oral at bedtime    MEDICATIONS  (PRN):  acetaminophen    Suspension .. 650 milliGRAM(s) Oral every 6 hours PRN Temp greater or equal to 38C (100.4F), Mild Pain (1 - 3)  dextrose 40% Gel 15 Gram(s) Oral once PRN Blood Glucose LESS THAN 70 milliGRAM(s)/deciliter  glucagon  Injectable 1 milliGRAM(s) IntraMuscular once PRN Glucose LESS THAN 70 milligrams/deciliter  LORazepam   Injectable 2 milliGRAM(s) IV Push every 4 hours PRN Agitation  morphine  - Injectable 2 milliGRAM(s) IV Push every 4 hours PRN Severe Pain (7 - 10)      Allergies    No Known Allergies    Intolerances        PAST MEDICAL & SURGICAL HISTORY:  Cardiac disease  DM (diabetes mellitus)  HTN (hypertension)  H/O cervical spine surgery      FAMILY HISTORY:  Family history unknown      SOCIAL HISTORY  Smoking History:     REVIEW OF SYSTEMS:    CONSTITUTIONAL:  Fevers [ x]  Yes  [  ]  No                                   chills [  ]  Yes  [X  ]  No                               sweats  [  ]  Yes  [ X ]  No                                fatigue [  ]  Yes  [ X ]  No    HEENT:  Eyes:  Diplopia or blurred vision [  ]  Yes  [  ]  No    ENT:                        earache  [  ]  Yes  [  ]  No                             sore throat  [  ]  Yes  [  ]  No                            runny nose.  [  ]  Yes  [  ]  No    CARDIOVASCULAR:       chest pain  [  ]  Yes  [  ]  No                        squeezing, tightness,  [  ]  Yes  [  ]  No                                      palpitations.  [  ]  Yes  [  ]  No    RESPIRATORY:             Cough [  ]  Yes  [  ]  No                                  Wheezing [  ]  Yes  [  ]  No                                Hemoptysis [  ]  Yes  [  ]  No                                      Sputum [  ]  Yes  [  ]  No                   Shortness of Breathe [  ]  Yes  [  ]  No    GASTROINTESTINAL:      Abdominal pain  [  ]  Yes  [  ]  No                                                    Nausea  [  ]  Yes  [  ]  No                                                   Vomiting [  ]  Yes  [  ]  No                                              Constipation [  ]  Yes  [  ]  No                                                    Diarrhea [  ]  Yes  [  ]  No    GENITOURINARY:           Incontinence [  ]  Yes  [  ]  No                                                Dysuria [  ]  Yes  [  ]  No                                      Blood in Urine  [  ]  Yes  [  ]  No                          Frequency or urgency.  [  ]  Yes  [  ]  No    NEUROLOGIC:   UNRESPONSIVE AT PRESENT                  Paresthesias  [  ]  Yes  [  ]  No                                             fasciculations  [  ]  Yes  [  ]  No                                seizures or weakness.  [  ]  Yes  [  ]  No                                                   Headache [  ]  Yes  [  ]  No                                  Loss of Conciousness [  ]  Yes  [  ]  No                                                     Insomnia [  ]  Yes  [  ]  No    PSYCHIATRIC:    Disorder of thought or mood.  [  ]  Yes  [  ]  No                                                           Anxiety [  ]  Yes  [  ]  No                                                      Depression [  ]  Yes  [  ]  No                                                         Dementia [  ]  Yes  [  ]  No  .ICU Vital Signs Last 24 Hrs  T(C): 38.1 (09 Sep 2018 15:49), Max: 39.1 (08 Sep 2018 22:00)  T(F): 100.5 (09 Sep 2018 15:49), Max: 102.3 (08 Sep 2018 22:00)  HR: 90 (09 Sep 2018 16:15) (83 - 138)  BP: 115/66 (09 Sep 2018 16:00) (61/39 - 169/92)  BP(mean): 77 (09 Sep 2018 16:00) (44 - 109)  ABP: --  ABP(mean): --  RR: 12 (09 Sep 2018 16:00) (11 - 26)  SpO2: 100% (09 Sep 2018 16:15) (93% - 100%)            PHYSICAL EXAMINATION:    GENERAL: The patient is a well-developed, well-nourished _____in no apparent distress.     HEENT:  ON VENT     NECK: Supple. jvd [  ]  Yes  [  X]  No    LUNGS: Clear to auscultation  [  X]  Yes  [  ]  No                               wheezing, [  ]  Yes  [X  ]  No                                      rales  [  ]  Yes  [  ]X  No                                    rhonchi  [ x ]  Yes  [  ]  No                 Respirations labored  [  ]  Yes  [  ]  No    HEART: Regular rate and rhythm  [ X ]  Yes  [  ]  No                                        Murmur [  ]  Yes  [  X]  No                                              rub  [  ]  Yes  [ X ]  No                                          gallop  [  ]  Yes  [  ]X  No    ABDOMEN:                                 Soft, X[  ]  Yes  [  ]  No                                             nontender [X  ]  Yes  [  ]  No                                             distended.[  ]  Yes  [X  ]  No                            hepatosplenomegaly ,[  ]  Yes  [ X ]  No                                                    BS   [  ]  Yes  [  ]  No    EXTREMITIES:                cyanosis, [  ]  Yes  [ X ]  No                                       clubbing,   [  ]  Yes  [  X]  No                                               rash, [  ]  Yes  [ X ]  No                                           edema.  [  ]  Yes  [ X ]  No    NEUROLOGIC:   UN RESPONSIVE .    LABS:                        11.4   13.4  )-----------( 219      ( 09 Sep 2018 09:38 )             33.7         141  |  112<H>  |  15  ----------------------------<  172<H>  3.4<L>   |  20<L>  |  0.74    Ca    6.7<L>      09 Sep 2018 00:36    TPro  8.3  /  Alb  3.7  /  TBili  <0.1<L>  /  DBili  x   /  AST  25  /  ALT  44  /  AlkPhos  85  -08    PT/INR - ( 09 Sep 2018 00:36 )   PT: 13.8 sec;   INR: 1.26 ratio         PTT - ( 08 Sep 2018 08:27 )  PTT:34.2 sec  Urinalysis Basic - ( 08 Sep 2018 15:09 )    Color: Yellow / Appearance: Clear / S.010 / pH: x  Gluc: x / Ketone: Trace  / Bili: Negative / Urobili: Negative   Blood: x / Protein: 100 / Nitrite: Negative   Leuk Esterase: Trace / RBC: 0-2 /HPF / WBC 0-2 /HPF   Sq Epi: x / Non Sq Epi: Few /HPF / Bacteria: Negative /HPF      ABG - ( 09 Sep 2018 04:43 )  pH, Arterial: 7.29  pH, Blood: x     /  pCO2: 40    /  pO2: 108   / HCO3: 19    / Base Excess: -6.7  /  SaO2: 98                CARDIAC MARKERS ( 08 Sep 2018 08:27 )  <0.015 ng/mL / x     / x     / x     / x        < from: Xray Chest 1 View- PORTABLE-Routine (18 @ 10:01) >  IMPRESSION:  Mild pulmonary venous congestion.  .    < from: CT Abdomen and Pelvis No Cont (18 @ 03:24) >  IMPRESSION:  No retroperitoneal hemorrhage. No bowel obstruction.    Extensive bilateral lower lobe consolidation. Differential diagnosis   includes multifocal pneumonia, aspiration pneumonia, alveolar hemorrhage.    Mild pulmonary venous congestion.        < end of copied text >      Lactate, Blood: 1.3 mmol/L (18 @ 00:36)          LABS:                        11.6   11.3  )-----------( 329      ( 08 Sep 2018 08:27 )             35.5         136  |  104  |  20<H>  ----------------------------<  151<H>  4.6   |  23  |  0.77    Ca    8.9      08 Sep 2018 08:27    TPro  8.3  /  Alb  3.7  /  TBili  <0.1<L>  /  DBili  x   /  AST  25  /  ALT  44  /  AlkPhos  85      PT/INR - ( 08 Sep 2018 08:27 )   PT: 10.1 sec;   INR: 0.93 ratio         PTT - ( 08 Sep 2018 08:27 )  PTT:34.2 sec  Urinalysis Basic - ( 08 Sep 2018 15:09 )    Color: Yellow / Appearance: Clear / S.010 / pH: x  Gluc: x / Ketone: Trace  / Bili: Negative / Urobili: Negative   Blood: x / Protein: 100 / Nitrite: Negative   Leuk Esterase: Trace / RBC: 0-2 /HPF / WBC 0-2 /HPF   Sq Epi: x / Non Sq Epi: Few /HPF / Bacteria: Negative /HPF      ABG - ( 08 Sep 2018 10:08 )  pH, Arterial: 7.40  pH, Blood: x     /  pCO2: 36    /  pO2: 92    / HCO3: 22    / Base Excess: -1.7  /  SaO2: 97                CARDIAC MARKERS ( 08 Sep 2018 08:27 )  <0.015 ng/mL / x     / x     / x     / x              Lactate, Blood: 0.6 mmol/L (09-08-18 @ 08:27)        MICROBIOLOGY:    RADIOLOGY & ADDITIONAL STUDIES:    CXR:  < from: Xray Chest 1 View AP/PA (18 @ 10:06) >  Impression: Successful placement of ET tube and NG tube  Question of developing infiltrate at the right lung base.      < from: CT Head No Cont (18 @ 07:55) >  IMPRESSION:     Limited study demonstrates no obvious abnormality as described above.            < end of copied text >    < end of copied text >    Ct scan chest:    ekg;    echo:

## 2018-09-09 NOTE — PROGRESS NOTE ADULT - SUBJECTIVE AND OBJECTIVE BOX
INTERVAL HPI/OVERNIGHT EVENTS:  failed weaning      Antimicrobial:  cefTRIAXone   IVPB      doxycycline IVPB 100 milliGRAM(s) IV Intermittent every 12 hours  doxycycline IVPB        Cardiovascular:  norepinephrine Infusion 0.5 MICROgram(s)/kG/Min IV Continuous <Continuous>    Pulmonary:    Hematalogic:    Other:  acetaminophen    Suspension .. 650 milliGRAM(s) Oral every 6 hours PRN  dextrose 40% Gel 15 Gram(s) Oral once PRN  dextrose 5% + sodium chloride 0.9%. 1000 milliLiter(s) IV Continuous <Continuous>  dextrose 5%. 1000 milliLiter(s) IV Continuous <Continuous>  dextrose 50% Injectable 12.5 Gram(s) IV Push once  dextrose 50% Injectable 25 Gram(s) IV Push once  dextrose 50% Injectable 25 Gram(s) IV Push once  fentaNYL   Infusion. 0.5 MICROgram(s)/kG/Hr IV Continuous <Continuous>  ferrous    sulfate Liquid 300 milliGRAM(s) Oral daily  glucagon  Injectable 1 milliGRAM(s) IntraMuscular once PRN  insulin glargine Injectable (LANTUS) 10 Unit(s) SubCutaneous at bedtime  insulin lispro (HumaLOG) corrective regimen sliding scale   SubCutaneous every 6 hours  LORazepam   Injectable 2 milliGRAM(s) IV Push every 4 hours PRN  morphine  - Injectable 2 milliGRAM(s) IV Push every 4 hours PRN  pantoprazole  Injectable 40 milliGRAM(s) IV Push two times a day  simvastatin 20 milliGRAM(s) Oral at bedtime      Drug Dosing Weight    Weight (kg): 66.6 (08 Sep 2018 11:22)    CENTRAL LINE: [ s] YES [ ] NO  LOCATION:   Right IJ   DATE INSERTED:    SANCHEZ: [s ] YES [ ] NO    DATE INSERTED:    A-LINE:  [ ] YES [s ] NO  LOCATION:   DATE INSERTED:    [facility]/Social Hx/Fam Hx -reviewed admission note, no change since admission  PAST MEDICAL & SURGICAL HISTORY:  Cardiac disease  DM (diabetes mellitus)  HTN (hypertension)  H/O cervical spine surgery      T(C): 37.1 (18 @ 07:43), Max: 39.1 (18 @ 22:00)  HR: 131 (18 @ 12:45)  BP: 165/91 (18 @ 12:45)  BP(mean): 109 (18 @ 12:45)  ABP: --  ABP(mean): --  RR: 12 (18 @ 12:45)  SpO2: 93% (18 @ 12:45)  Wt(kg): --    ABG - ( 09 Sep 2018 04:43 )  pH, Arterial: 7.29  pH, Blood: x     /  pCO2: 40    /  pO2: 108   / HCO3: 19    / Base Excess: -6.7  /  SaO2: 98                     @ 07:01  -   @ 07:00  --------------------------------------------------------  IN: 7283.6 mL / OUT: 1890 mL / NET: 5393.6 mL        Mode: AC/ CMV (Assist Control/ Continuous Mandatory Ventilation)  RR (machine): 12  TV (machine): 450  FiO2: 40  PEEP: 5  ITime: 1  MAP: 9  PIP: 29      PHYSICAL EXAM:    GENERAL: No signs of distress, comfortable  HEAD: Atraumatic, Normocephalic  EYES: EOMI, PERRLA  ENMT: No erythema, exudates, or enlargement, Moist mucous membranes  NECK: Supple, normal appearance, No JVD; [  ] central line (if applicable)  CHEST/LUNG: No chest deformity, fair bilateral air entry; No rales, rhonchi, wheezing; crackles  HEART: Regular rate and rhythm; No murmurs, rubs, or gallops;   ABDOMEN: Soft, Nontender, Nondistended; Bowel sounds present  EXTREMITIES:  + Peripheral Pulses, No clubbing, cyanosis, or edema  NERVOUS SYSTEM: encephalopathic, open eyes to voice  LYMPH: No lymphadenopathy noted  SKIN: No rashes or lesions; good turgor, warm, dry      LABS:  CBC Full  -  ( 09 Sep 2018 09:38 )  WBC Count : 13.4 K/uL  Hemoglobin : 11.4 g/dL  Hematocrit : 33.7 %  Platelet Count - Automated : 219 K/uL  Mean Cell Volume : 85.9 fl  Mean Cell Hemoglobin : 29.2 pg  Mean Cell Hemoglobin Concentration : 34.0 gm/dL  Auto Neutrophil # : x  Auto Lymphocyte # : x  Auto Monocyte # : x  Auto Eosinophil # : x  Auto Basophil # : x  Auto Neutrophil % : x  Auto Lymphocyte % : x  Auto Monocyte % : x  Auto Eosinophil % : x  Auto Basophil % : x        141  |  112<H>  |  15  ----------------------------<  172<H>  3.4<L>   |  20<L>  |  0.74    Ca    6.7<L>      09 Sep 2018 00:36    TPro  8.3  /  Alb  3.7  /  TBili  <0.1<L>  /  DBili  x   /  AST  25  /  ALT  44  /  AlkPhos  85  08    PT/INR - ( 09 Sep 2018 00:36 )   PT: 13.8 sec;   INR: 1.26 ratio         PTT - ( 08 Sep 2018 08:27 )  PTT:34.2 sec  Urinalysis Basic - ( 08 Sep 2018 15:09 )    Color: Yellow / Appearance: Clear / S.010 / pH: x  Gluc: x / Ketone: Trace  / Bili: Negative / Urobili: Negative   Blood: x / Protein: 100 / Nitrite: Negative   Leuk Esterase: Trace / RBC: 0-2 /HPF / WBC 0-2 /HPF   Sq Epi: x / Non Sq Epi: Few /HPF / Bacteria: Negative /HPF          RADIOLOGY & ADDITIONAL STUDIES REVIEWED   CXR:< from: Xray Chest 1 View- PORTABLE-Routine (18 @ 10:01) >  INTERPRETATION:  Portable Chest X-Ray dated 2018 10:01 AM    Indication: ICU f/u    Prior studies: 2018 at 1:37 AM    An AP portable view ofthe chest tip of the endotracheal tube 1.5 cm   above the chencho and it may be pulled back by 1 to 2 cm. Right central   venous catheter with the tip in the right atrium. NG tube with its tip   below the diaphragm but not included on this film. Magnified cardiac   silhouette. Mild pulmonary venous congestion, increased since the prior   study. No pleural effusions . Hazy opacities in the lower lobes could   represent atelectasis or infiltrates.    IMPRESSION:  Mild pulmonary venous congestion.    < end of copied text >        IMPRESSION: cefTRIAXone   IVPB      doxycycline IVPB 100 milliGRAM(s) IV Intermittent every 12 hours  doxycycline IVPB       norepinephrine Infusion 0.5 MICROgram(s)/kG/Min IV Continuous <Continuous>   PAST MEDICAL & SURGICAL HISTORY:  Cardiac disease  DM (diabetes mellitus)  HTN (hypertension)  H/O cervical spine surgery   p/w       IMP: This is a 57 yr old woman with prior mentioned medical found unresponsive due to hypoglycemia in rehab center. According to NH staff .. seems like pat took her own meds and also was given meds NH staff.  She was intubated for airway protection. Mental  status improved. CXR demonstrated possible new b/l infiltrate due to asp pna . Overnight TLC placed for septic shock, started on pressors. Endo consult noted .. lantus and humolog  Plan:            Plan:    CNS: continue sedation    PULMONARY: vent support    CARDIAC: keep MAP>65    GI: NGT     RENAL: monitor out put    SKIN: no issue    MUSCULOSKELETAL: bedside PT    ID: continue antibx, f/u cultures    HEME: monitor    ENDO: started on lantus, monitor FS    DVT and GI Prophylaxis    GOALS OF CARE DISCUSSION WITH PATIENT/FAMILY/PROXY/ icu team on rounds    CRITICAL CARE TIME SPENT: 35 minutes

## 2018-09-09 NOTE — PROGRESS NOTE ADULT - SUBJECTIVE AND OBJECTIVE BOX
INTERVAL HPI/OVERNIGHT EVENTS: Pt spiked fever of 102, bcx sent, lactate wnl, Hb dropped to 7.6, 2 prbcs, Ct abdomen pelvis done,  hypotension s/p 2 Bolus, levophed and central line placed, consent was taken from spouse over phone.     PRESSORS: Yes  WHICH: Levophed    ANTIBIOTICS:                      Antimicrobial:  piperacillin/tazobactam IVPB. 3.375 Gram(s) IV Intermittent every 8 hours  vancomycin  IVPB 1000 milliGRAM(s) IV Intermittent every 12 hours    Cardiovascular:  norepinephrine Infusion 0.5 MICROgram(s)/kG/Min IV Continuous <Continuous>    Pulmonary:    Hematalogic:    Other:  acetaminophen    Suspension .. 650 milliGRAM(s) Oral every 6 hours PRN  dextrose 40% Gel 15 Gram(s) Oral once PRN  dextrose 5% + sodium chloride 0.9%. 1000 milliLiter(s) IV Continuous <Continuous>  dextrose 5%. 1000 milliLiter(s) IV Continuous <Continuous>  dextrose 50% Injectable 12.5 Gram(s) IV Push once  dextrose 50% Injectable 25 Gram(s) IV Push once  dextrose 50% Injectable 25 Gram(s) IV Push once  fentaNYL   Infusion. 0.5 MICROgram(s)/kG/Hr IV Continuous <Continuous>  ferrous    sulfate 325 milliGRAM(s) Oral daily  glucagon  Injectable 1 milliGRAM(s) IntraMuscular once PRN  insulin lispro (HumaLOG) corrective regimen sliding scale   SubCutaneous every 6 hours  LORazepam   Injectable 2 milliGRAM(s) IV Push every 4 hours PRN  morphine  - Injectable 2 milliGRAM(s) IV Push every 4 hours PRN  pantoprazole  Injectable 40 milliGRAM(s) IV Push two times a day  simvastatin 20 milliGRAM(s) Oral at bedtime  sodium chloride 0.9% Bolus 1000 milliLiter(s) IV Bolus once    acetaminophen    Suspension .. 650 milliGRAM(s) Oral every 6 hours PRN  dextrose 40% Gel 15 Gram(s) Oral once PRN  dextrose 5% + sodium chloride 0.9%. 1000 milliLiter(s) IV Continuous <Continuous>  dextrose 5%. 1000 milliLiter(s) IV Continuous <Continuous>  dextrose 50% Injectable 12.5 Gram(s) IV Push once  dextrose 50% Injectable 25 Gram(s) IV Push once  dextrose 50% Injectable 25 Gram(s) IV Push once  fentaNYL   Infusion. 0.5 MICROgram(s)/kG/Hr IV Continuous <Continuous>  ferrous    sulfate 325 milliGRAM(s) Oral daily  glucagon  Injectable 1 milliGRAM(s) IntraMuscular once PRN  insulin lispro (HumaLOG) corrective regimen sliding scale   SubCutaneous every 6 hours  LORazepam   Injectable 2 milliGRAM(s) IV Push every 4 hours PRN  morphine  - Injectable 2 milliGRAM(s) IV Push every 4 hours PRN  norepinephrine Infusion 0.5 MICROgram(s)/kG/Min IV Continuous <Continuous>  pantoprazole  Injectable 40 milliGRAM(s) IV Push two times a day  piperacillin/tazobactam IVPB. 3.375 Gram(s) IV Intermittent every 8 hours  simvastatin 20 milliGRAM(s) Oral at bedtime  sodium chloride 0.9% Bolus 1000 milliLiter(s) IV Bolus once  vancomycin  IVPB 1000 milliGRAM(s) IV Intermittent every 12 hours    Drug Dosing Weight    Weight (kg): 66.6 (08 Sep 2018 11:22)    CENTRAL LINE: Yes    SANCHEZ: Yes    A-LINE: No    PMH -reviewed admission note, no change since admission    ICU Vital Signs Last 24 Hrs  T(C): 37 (09 Sep 2018 04:01), Max: 39.1 (08 Sep 2018 22:00)  T(F): 98.6 (09 Sep 2018 04:01), Max: 102.3 (08 Sep 2018 22:00)  HR: 92 (09 Sep 2018 04:04) (83 - 129)  BP: 96/59 (09 Sep 2018 04:00) (61/39 - 183/155)  BP(mean): 68 (09 Sep 2018 04:00) (44 - 162)  ABP: --  ABP(mean): --  RR: 11 (09 Sep 2018 04:00) (11 - 26)  SpO2: 99% (09 Sep 2018 04:04) (93% - 100%)      ABG - ( 09 Sep 2018 04:43 )  pH, Arterial: 7.29  pH, Blood: x     /  pCO2: 40    /  pO2: 108   / HCO3: 19    / Base Excess: -6.7  /  SaO2: 98                        Mode: AC/ CMV (Assist Control/ Continuous Mandatory Ventilation)  RR (machine): 12  TV (machine): 450  FiO2: 40  PEEP: 5  ITime: 1  MAP: 9  PIP: 30      PHYSICAL EXAM:    GENERAL: NAD, well-groomed, well-developed  HEAD:  Atraumatic, Normocephalic  EYES: EOMI, PERRLA, conjunctiva and sclera clear  ENMT: Intubated   NECK: Supple  NERVOUS SYSTEM:  Sedated   CHEST/LUNG: No chest deformity; Normal percussion bilaterally; No rales, rhonchi, wheezing   HEART: Regular rate and rhythm; No murmurs, rubs, or gallops  ABDOMEN: Soft, Nontender, Nondistended; Bowel sounds present  EXTREMITIES:  2+ Peripheral Pulses, No clubbing, cyanosis, or edema  LYMPH: No lymphadenopathy noted  SKIN: No rashes or lesions; Good capillary refill      LABS:  CBC Full  -  ( 09 Sep 2018 01:37 )  WBC Count : 12.0 K/uL  Hemoglobin : 7.6 g/dL  Hematocrit : 22.5 %  Platelet Count - Automated : 200 K/uL  Mean Cell Volume : 86.0 fl  Mean Cell Hemoglobin : 28.9 pg  Mean Cell Hemoglobin Concentration : 33.6 gm/dL  Auto Neutrophil # : x  Auto Lymphocyte # : x  Auto Monocyte # : x  Auto Eosinophil # : x  Auto Basophil # : x  Auto Neutrophil % : x  Auto Lymphocyte % : x  Auto Monocyte % : x  Auto Eosinophil % : x  Auto Basophil % : x        141  |  112<H>  |  15  ----------------------------<  172<H>  3.4<L>   |  20<L>  |  0.74    Ca    6.7<L>      09 Sep 2018 00:36    TPro  8.3  /  Alb  3.7  /  TBili  <0.1<L>  /  DBili  x   /  AST  25  /  ALT  44  /  AlkPhos  85  08    PT/INR - ( 09 Sep 2018 00:36 )   PT: 13.8 sec;   INR: 1.26 ratio         PTT - ( 08 Sep 2018 08:27 )  PTT:34.2 sec  Urinalysis Basic - ( 08 Sep 2018 15:09 )    Color: Yellow / Appearance: Clear / S.010 / pH: x  Gluc: x / Ketone: Trace  / Bili: Negative / Urobili: Negative   Blood: x / Protein: 100 / Nitrite: Negative   Leuk Esterase: Trace / RBC: 0-2 /HPF / WBC 0-2 /HPF   Sq Epi: x / Non Sq Epi: Few /HPF / Bacteria: Negative /HPF          RADIOLOGY & ADDITIONAL STUDIES REVIEWED:        GOALS OF CARE DISCUSSION WITH PATIENT/FAMILY/PROXY:    CRITICAL CARE TIME SPENT: 35 minutes

## 2018-09-10 DIAGNOSIS — J96.01 ACUTE RESPIRATORY FAILURE WITH HYPOXIA: ICD-10-CM

## 2018-09-10 LAB
ALBUMIN SERPL ELPH-MCNC: 2.2 G/DL — LOW (ref 3.5–5)
ALP SERPL-CCNC: 57 U/L — SIGNIFICANT CHANGE UP (ref 40–120)
ALT FLD-CCNC: 22 U/L DA — SIGNIFICANT CHANGE UP (ref 10–60)
ANION GAP SERPL CALC-SCNC: 8 MMOL/L — SIGNIFICANT CHANGE UP (ref 5–17)
AST SERPL-CCNC: 14 U/L — SIGNIFICANT CHANGE UP (ref 10–40)
BASE EXCESS BLDA CALC-SCNC: -5 MMOL/L — LOW (ref -2–2)
BASOPHILS # BLD AUTO: 0.1 K/UL — SIGNIFICANT CHANGE UP (ref 0–0.2)
BASOPHILS NFR BLD AUTO: 0.8 % — SIGNIFICANT CHANGE UP (ref 0–2)
BILIRUB SERPL-MCNC: 0.4 MG/DL — SIGNIFICANT CHANGE UP (ref 0.2–1.2)
BLOOD GAS COMMENTS ARTERIAL: SIGNIFICANT CHANGE UP
BUN SERPL-MCNC: 6 MG/DL — LOW (ref 7–18)
CALCIUM SERPL-MCNC: 7.5 MG/DL — LOW (ref 8.4–10.5)
CHLORIDE SERPL-SCNC: 111 MMOL/L — HIGH (ref 96–108)
CO2 SERPL-SCNC: 21 MMOL/L — LOW (ref 22–31)
CREAT SERPL-MCNC: 0.58 MG/DL — SIGNIFICANT CHANGE UP (ref 0.5–1.3)
EOSINOPHIL # BLD AUTO: 0.3 K/UL — SIGNIFICANT CHANGE UP (ref 0–0.5)
EOSINOPHIL NFR BLD AUTO: 2.1 % — SIGNIFICANT CHANGE UP (ref 0–6)
GLUCOSE BLDC GLUCOMTR-MCNC: 138 MG/DL — HIGH (ref 70–99)
GLUCOSE BLDC GLUCOMTR-MCNC: 147 MG/DL — HIGH (ref 70–99)
GLUCOSE BLDC GLUCOMTR-MCNC: 179 MG/DL — HIGH (ref 70–99)
GLUCOSE BLDC GLUCOMTR-MCNC: 191 MG/DL — HIGH (ref 70–99)
GLUCOSE BLDC GLUCOMTR-MCNC: 191 MG/DL — HIGH (ref 70–99)
GLUCOSE SERPL-MCNC: 142 MG/DL — HIGH (ref 70–99)
GRAM STN FLD: SIGNIFICANT CHANGE UP
HCO3 BLDA-SCNC: 20 MMOL/L — LOW (ref 23–27)
HCT VFR BLD CALC: 35.6 % — SIGNIFICANT CHANGE UP (ref 34.5–45)
HGB BLD-MCNC: 11.6 G/DL — SIGNIFICANT CHANGE UP (ref 11.5–15.5)
HOROWITZ INDEX BLDA+IHG-RTO: 40 — SIGNIFICANT CHANGE UP
LYMPHOCYTES # BLD AUTO: 16.5 % — SIGNIFICANT CHANGE UP (ref 13–44)
LYMPHOCYTES # BLD AUTO: 2.5 K/UL — SIGNIFICANT CHANGE UP (ref 1–3.3)
MAGNESIUM SERPL-MCNC: 1.5 MG/DL — LOW (ref 1.6–2.6)
MAGNESIUM SERPL-MCNC: 1.6 MG/DL — SIGNIFICANT CHANGE UP (ref 1.6–2.6)
MCHC RBC-ENTMCNC: 28.4 PG — SIGNIFICANT CHANGE UP (ref 27–34)
MCHC RBC-ENTMCNC: 32.8 GM/DL — SIGNIFICANT CHANGE UP (ref 32–36)
MCV RBC AUTO: 86.7 FL — SIGNIFICANT CHANGE UP (ref 80–100)
MONOCYTES # BLD AUTO: 0.8 K/UL — SIGNIFICANT CHANGE UP (ref 0–0.9)
MONOCYTES NFR BLD AUTO: 5.6 % — SIGNIFICANT CHANGE UP (ref 2–14)
MRSA PCR RESULT.: SIGNIFICANT CHANGE UP
NEUTROPHILS # BLD AUTO: 11.3 K/UL — HIGH (ref 1.8–7.4)
NEUTROPHILS NFR BLD AUTO: 75 % — SIGNIFICANT CHANGE UP (ref 43–77)
PCO2 BLDA: 36 MMHG — SIGNIFICANT CHANGE UP (ref 32–46)
PH BLDA: 7.35 — SIGNIFICANT CHANGE UP (ref 7.35–7.45)
PHOSPHATE SERPL-MCNC: 1.1 MG/DL — LOW (ref 2.5–4.5)
PHOSPHATE SERPL-MCNC: 2.1 MG/DL — LOW (ref 2.5–4.5)
PLATELET # BLD AUTO: 227 K/UL — SIGNIFICANT CHANGE UP (ref 150–400)
PO2 BLDA: 119 MMHG — HIGH (ref 74–108)
POTASSIUM SERPL-MCNC: 3.9 MMOL/L — SIGNIFICANT CHANGE UP (ref 3.5–5.3)
POTASSIUM SERPL-SCNC: 3.9 MMOL/L — SIGNIFICANT CHANGE UP (ref 3.5–5.3)
PROT SERPL-MCNC: 5.9 G/DL — LOW (ref 6–8.3)
RBC # BLD: 4.1 M/UL — SIGNIFICANT CHANGE UP (ref 3.8–5.2)
RBC # FLD: 12.8 % — SIGNIFICANT CHANGE UP (ref 10.3–14.5)
S AUREUS DNA NOSE QL NAA+PROBE: DETECTED
SAO2 % BLDA: 99 % — HIGH (ref 92–96)
SODIUM SERPL-SCNC: 140 MMOL/L — SIGNIFICANT CHANGE UP (ref 135–145)
SPECIMEN SOURCE: SIGNIFICANT CHANGE UP
WBC # BLD: 15 K/UL — HIGH (ref 3.8–10.5)
WBC # FLD AUTO: 15 K/UL — HIGH (ref 3.8–10.5)

## 2018-09-10 PROCEDURE — 71045 X-RAY EXAM CHEST 1 VIEW: CPT | Mod: 26

## 2018-09-10 RX ORDER — POTASSIUM PHOSPHATE, MONOBASIC POTASSIUM PHOSPHATE, DIBASIC 236; 224 MG/ML; MG/ML
15 INJECTION, SOLUTION INTRAVENOUS ONCE
Qty: 0 | Refills: 0 | Status: COMPLETED | OUTPATIENT
Start: 2018-09-10 | End: 2018-09-10

## 2018-09-10 RX ORDER — MAGNESIUM SULFATE 500 MG/ML
2 VIAL (ML) INJECTION ONCE
Qty: 0 | Refills: 0 | Status: COMPLETED | OUTPATIENT
Start: 2018-09-10 | End: 2018-09-10

## 2018-09-10 RX ORDER — DEXMEDETOMIDINE HYDROCHLORIDE IN 0.9% SODIUM CHLORIDE 4 UG/ML
0.2 INJECTION INTRAVENOUS
Qty: 200 | Refills: 0 | Status: DISCONTINUED | OUTPATIENT
Start: 2018-09-10 | End: 2018-09-12

## 2018-09-10 RX ADMIN — DEXMEDETOMIDINE HYDROCHLORIDE IN 0.9% SODIUM CHLORIDE 3.33 MICROGRAM(S)/KG/HR: 4 INJECTION INTRAVENOUS at 18:47

## 2018-09-10 RX ADMIN — CEFEPIME 100 MILLIGRAM(S): 1 INJECTION, POWDER, FOR SOLUTION INTRAMUSCULAR; INTRAVENOUS at 05:01

## 2018-09-10 RX ADMIN — Medication 650 MILLIGRAM(S): at 11:55

## 2018-09-10 RX ADMIN — Medication 2: at 18:16

## 2018-09-10 RX ADMIN — Medication 300 MILLIGRAM(S): at 11:12

## 2018-09-10 RX ADMIN — Medication 250 MILLIGRAM(S): at 18:12

## 2018-09-10 RX ADMIN — FENTANYL CITRATE 3.33 MICROGRAM(S)/KG/HR: 50 INJECTION INTRAVENOUS at 00:48

## 2018-09-10 RX ADMIN — INSULIN GLARGINE 10 UNIT(S): 100 INJECTION, SOLUTION SUBCUTANEOUS at 23:25

## 2018-09-10 RX ADMIN — Medication 250 MILLIGRAM(S): at 05:44

## 2018-09-10 RX ADMIN — Medication 2: at 11:12

## 2018-09-10 RX ADMIN — CEFEPIME 100 MILLIGRAM(S): 1 INJECTION, POWDER, FOR SOLUTION INTRAMUSCULAR; INTRAVENOUS at 22:26

## 2018-09-10 RX ADMIN — PANTOPRAZOLE SODIUM 40 MILLIGRAM(S): 20 TABLET, DELAYED RELEASE ORAL at 18:12

## 2018-09-10 RX ADMIN — Medication 1 DROP(S): at 05:48

## 2018-09-10 RX ADMIN — Medication 650 MILLIGRAM(S): at 11:56

## 2018-09-10 RX ADMIN — POTASSIUM PHOSPHATE, MONOBASIC POTASSIUM PHOSPHATE, DIBASIC 62.5 MILLIMOLE(S): 236; 224 INJECTION, SOLUTION INTRAVENOUS at 16:05

## 2018-09-10 RX ADMIN — Medication 50 GRAM(S): at 15:36

## 2018-09-10 RX ADMIN — POTASSIUM PHOSPHATE, MONOBASIC POTASSIUM PHOSPHATE, DIBASIC 62.5 MILLIMOLE(S): 236; 224 INJECTION, SOLUTION INTRAVENOUS at 09:27

## 2018-09-10 RX ADMIN — PANTOPRAZOLE SODIUM 40 MILLIGRAM(S): 20 TABLET, DELAYED RELEASE ORAL at 05:02

## 2018-09-10 RX ADMIN — CEFEPIME 100 MILLIGRAM(S): 1 INJECTION, POWDER, FOR SOLUTION INTRAMUSCULAR; INTRAVENOUS at 15:36

## 2018-09-10 RX ADMIN — Medication 1 DROP(S): at 19:34

## 2018-09-10 RX ADMIN — SIMVASTATIN 20 MILLIGRAM(S): 20 TABLET, FILM COATED ORAL at 22:28

## 2018-09-10 RX ADMIN — Medication 4: at 00:27

## 2018-09-10 NOTE — DIETITIAN INITIAL EVALUATION ADULT. - PERTINENT LABORATORY DATA
09-10 Na140 mmol/L Glu 142 mg/dL<H> K+ 3.9 mmol/L Cr  0.58 mg/dL BUN 6 mg/dL<L> 09-10 Phos 2.1 mg/dL<L> 09-10 Alb 2.2 g/dL<L> 09-09 [id number] 7.6 %<H>

## 2018-09-10 NOTE — PROGRESS NOTE ADULT - ASSESSMENT
58 y/o female from [facility] rehab with PMH of HTN, DM and ?cardiac disease (family not sure about exact diagnosis) and PSH of recent C spine surgery (1 month back at [place] for ?nerve compression) sent to ED for unresponsiveness. Likely secondary to hypoglycemia. Was intubated in ED and admitted to ICU. 58 y/o female from [facility] rehab with PMH of HTN, DM and ?cardiac disease (family not sure about exact diagnosis) and PSH of recent C spine surgery (1 month back at [place] for ?nerve compression) sent to ED for unresponsiveness. Likely secondary to hypoglycemia. Was intubated in ED and admitted to ICU on 9/9/10

## 2018-09-10 NOTE — CONSULT NOTE ADULT - SUBJECTIVE AND OBJECTIVE BOX
PULMONARY CONSULT NOTE      JACQUELIN LINDER  MRN-[id number]    Patient is a 57y old  Female who presents with a chief complaint of unresponsiveness (10 Sep 2018 08:12)  Hx chart lab and xrays reviewed, Pt on vent      HISTORY OF PRESENT ILLNESS:  56 y/o female from [facility] rehab with PMH of HTN, DM and ?cardiac disease (family not sure about exact diagnosis) and PSH of recent C spine surgery (1 month back at [place] for ?nerve compression) sent to ED for unresponsiveness. As per EMS, patient was thought to have low blood sugars so was treated for that without improvement in mental status. Fs in . CT head was unremarkable. CXR clear. Utox only positive for benzodiazepine ([patient is on Valium 5 mg q12). Patient was intubated by ED attending and admitted to ICU for further management.     Per rehab papers, patient was noted to have blood sugars of 49 last night at 9 pm, was given 2 mg of glucagon IM. AM fs at rehab was noted to be 73 and patient was minimally responsive         Home Medications:  Aspercreme with Lidocaine 4% topical film: Apply topically to affected area once a day (08 Sep 2018 11:34)  Basaglar KwikPen 100 units/mL subcutaneous solution: 38 unit(s) subcutaneous once a day (at bedtime) (08 Sep 2018 11:34)  carvedilol 25 mg oral tablet: 1 tab(s) orally 2 times a day (08 Sep 2018 11:34)  Colace 100 mg oral capsule: 3 cap(s) orally once a day (at bedtime) (08 Sep 2018 11:34)  ferrous sulfate 325 mg (65 mg elemental iron) oral tablet: 1 tab(s) orally once a day (08 Sep 2018 11:34)  Flexeril 10 mg oral tablet: 1 tab(s) orally 3 times a day (08 Sep 2018 11:34)  gabapentin 300 mg oral capsule: 1 cap(s) orally once a day (at bedtime) (08 Sep 2018 11:34)  HumaLOG 100 units/mL subcutaneous solution: 5 unit(s) subcutaneous 3 times a day (before meals) (08 Sep 2018 11:34)  HumaLOG 100 units/mL subcutaneous solution: sliding scale (08 Sep 2018 11:34)  hydroCHLOROthiazide 25 mg oral tablet: 1 tab(s) orally once a day (08 Sep 2018 11:34)  losartan 100 mg oral tablet: 1 tab(s) orally once a day (08 Sep 2018 11:34)  Maalox Advanced Regular Strength oral suspension: 30 milliliter(s) orally 3 times a day (08 Sep 2018 11:34)  metFORMIN 1000 mg oral tablet: 1 tab(s) orally 2 times a day (08 Sep 2018 11:34)  Percocet 5/325 oral tablet: 1 tab(s) orally every 4 hours, As Needed (08 Sep 2018 11:34)  raNITIdine 150 mg oral tablet: 1 tab(s) orally once a day (08 Sep 2018 11:34)  Senna 8.6 mg oral tablet: 1 tab(s) orally once a day (at bedtime) (08 Sep 2018 11:34)  simvastatin 20 mg oral tablet: 1 tab(s) orally once a day (at bedtime) (08 Sep 2018 11:34)  Valium 5 mg oral tablet: 1 tab(s) orally every 12 hours (08 Sep 2018 11:34)  Vitamin B-100 oral tablet: 1 tab(s) orally once a day (08 Sep 2018 11:34)      MEDICATIONS  (STANDING):  artificial  tears Solution 1 Drop(s) Both EYES two times a day  cefepime   IVPB      cefepime   IVPB 2000 milliGRAM(s) IV Intermittent every 8 hours  dextrose 5%. 1000 milliLiter(s) (50 mL/Hr) IV Continuous <Continuous>  dextrose 50% Injectable 12.5 Gram(s) IV Push once  dextrose 50% Injectable 25 Gram(s) IV Push once  dextrose 50% Injectable 25 Gram(s) IV Push once  fentaNYL   Infusion. 0.5 MICROgram(s)/kG/Hr (3.33 mL/Hr) IV Continuous <Continuous>  ferrous    sulfate Liquid 300 milliGRAM(s) Oral daily  insulin glargine Injectable (LANTUS) 10 Unit(s) SubCutaneous at bedtime  insulin lispro (HumaLOG) corrective regimen sliding scale   SubCutaneous every 6 hours  norepinephrine Infusion 0.5 MICROgram(s)/kG/Min (31.219 mL/Hr) IV Continuous <Continuous>  pantoprazole  Injectable 40 milliGRAM(s) IV Push two times a day  potassium phosphate IVPB 15 milliMole(s) IV Intermittent once  simvastatin 20 milliGRAM(s) Oral at bedtime  vancomycin  IVPB 1000 milliGRAM(s) IV Intermittent every 12 hours      MEDICATIONS  (PRN):  acetaminophen    Suspension .. 650 milliGRAM(s) Oral every 6 hours PRN Temp greater or equal to 38C (100.4F), Mild Pain (1 - 3)  dextrose 40% Gel 15 Gram(s) Oral once PRN Blood Glucose LESS THAN 70 milliGRAM(s)/deciliter  glucagon  Injectable 1 milliGRAM(s) IntraMuscular once PRN Glucose LESS THAN 70 milligrams/deciliter  LORazepam   Injectable 2 milliGRAM(s) IV Push every 4 hours PRN Agitation  morphine  - Injectable 2 milliGRAM(s) IV Push every 4 hours PRN Severe Pain (7 - 10)      Allergies    No Known Allergies            PAST MEDICAL & SURGICAL HISTORY:  Cardiac disease  DM (diabetes mellitus)  HTN (hypertension)  H/O cervical spine surgery      FAMILY HISTORY:  Family history unknown  HTN--    DM--   IHD --  Asthma -- COPD --    SOCIAL HISTORY SMOKING--   ETOH --    DRUGS as per chart    REVIEW OF SYSTEMS: as per RN  CONSTITUTIONAL: No fever, weight loss, or fatigue   EYES: No eye pain, visual disturbances, or discharge  ENT:  No difficulty hearing, tinnitus, vertigo; No sinus or throat pain  NECK: No pain or stiffness   RESPIRATORY:  cough--   wheezing--   chills--   hemoptysis--    Shortness of Breath--;Intubated  CARDIOVASCULAR: No chest pain, palpitations, passing out, dizziness, or leg swelling  HEME/LYMPH: No easy bruising, or bleeding gums  ALLERGY AND IMMUNOLOGIC: No hives or eczema      Vital Signs Last 24 Hrs  T(C): 37.8 (10 Sep 2018 07:30), Max: 38.6 (09 Sep 2018 15:30)  T(F): 100.1 (10 Sep 2018 07:30), Max: 101.4 (09 Sep 2018 15:30)  HR: 108 (10 Sep 2018 08:24) (88 - 138)  BP: 107/60 (10 Sep 2018 08:00) (88/52 - 169/92)  BP(mean): 70 (10 Sep 2018 08:00) (60 - 110)  RR: 12 (10 Sep 2018 08:00) (12 - 21)  SpO2: 100% (10 Sep 2018 08:24) (93% - 100%)  I&O's Detail    09 Sep 2018 07:01  -  10 Sep 2018 07:00  --------------------------------------------------------  IN:    dextrose 5% + sodium chloride 0.9%: 600 mL    Enteral Tube Flush: 100 mL    fentaNYL Infusion.: 410 mL    IV PiggyBack: 150 mL    norepinephrine Infusion: 65.6 mL    Solution: 250 mL    Solution: 200 mL    Solution: 100 mL    Solution: 100 mL    Solution: 50 mL  Total IN: 2025.6 mL    OUT:    Indwelling Catheter - Urethral: 2685 mL  Total OUT: 2685 mL    Total NET: -659.4 mL      10 Sep 2018 07:01  -  10 Sep 2018 08:53  --------------------------------------------------------  IN:    fentaNYL Infusion.: 20 mL    norepinephrine Infusion: 1.2 mL  Total IN: 21.2 mL    OUT:    Indwelling Catheter - Urethral: 50 mL  Total OUT: 50 mL    Total NET: -28.8 mL          PHYSICAL EXAMINATION:    GENERAL: The patient is a well-developed, well-nourished in no apparent distress. Intubated on CMV12/450/40% with PP24-26  SKIN: No rashes ecchymoses or cyanosis  HEENT: Head is normocephalic and atraumatic. Eyes closed but rolled upwards. Mucous membranes are moist.   Neck supple LN not felt, JVP not increased  Thyroid not enlarged, Rt IJ in place  Lymphatic: No lymphadenopathy  Cardiovascular:  S1 S2  heard ,RSR , JVP not increased , syst murmur at apex, No  gallop or rub  Respiratory:  Symmetrical chest wall movements Breathing vesicular , Percussion note normal no dulness   with  lt basal rales, no  wheeze  ABDOMEN:  Soft, Non-tender, NGT,  No  hepatosplenomegaly ,BS positive		  Extremities: Normal range of motion, No clubbing, cyanosis or edema , No calf tenderness  Vascular: Peripheral pulses palpable 2+ bilaterally  CNS:  Intubated, sedated b/L dysfunction           LABS:                        11.6   15.0  )-----------( 227      ( 10 Sep 2018 06:13 )             35.6     09-10    140  |  111<H>  |  6<L>  ----------------------------<  142<H>  3.9   |  21<L>  |  0.58    Ca    7.5<L>      10 Sep 2018 06:13  Phos  1.1     09-10  Mg     1.6     09-10    TPro  5.9<L>  /  Alb  2.2<L>  /  TBili  0.4  /  DBili  x   /  AST  14  /  ALT  22  /  AlkPhos  57  -10    PT/INR - ( 09 Sep 2018 00:36 )   PT: 13.8 sec;   INR: 1.26 ratio           Urinalysis Basic - ( 08 Sep 2018 15:09 )    Color: Yellow / Appearance: Clear / S.010 / pH: x  Gluc: x / Ketone: Trace  / Bili: Negative / Urobili: Negative   Blood: x / Protein: 100 / Nitrite: Negative   Leuk Esterase: Trace / RBC: 0-2 /HPF / WBC 0-2 /HPF   Sq Epi: x / Non Sq Epi: Few /HPF / Bacteria: Negative /HPF      ABG - ( 10 Sep 2018 04:26 )  pH, Arterial: 7.35  pH, Blood: x     /  pCO2: 36    /  pO2: 119   / HCO3: 20    / Base Excess: -5  MICROBIOLOGY:      RADIOLOGY & ADDITIONAL STUDIES:    CXR:< from: Xray Chest 1 View- PORTABLE-Routine (18 @ 10:01) >    An AP portable view ofthe chest tip of the endotracheal tube 1.5 cm   above the chencho and it may be pulled back by 1 to 2 cm. Right central   venous catheter with the tip in the right atrium. NG tube with its tip   below the diaphragm but not included on this film. Magnified cardiac   silhouette. Mild pulmonary venous congestion, increased since the prior   study. No pleural effusions . Hazy opacities in the lower lobes could   represent atelectasis or infiltrates.     Normal on admission        Ct scan ABD;< from: CT Abdomen and Pelvis No Cont (18 @ 03:24) >  No retroperitoneal hemorrhage. No bowel obstruction.    Extensive bilateral lower lobe consolidation. Differential diagnosis   includes multifocal pneumonia, aspiration pneumonia, alveolar hemorrhage.    Mild pulmonary venous congestion.    CT Head No Cont (18 @ 07:55) >    There is no evidence of hydrocephalus, mass effect or any sizable   extra-axial collection. Due to motion artifact, subtle abnormalities   cannot be evaluated. Probably incidental lentiform nuclei calcification   is seen.    Bony calvarium, visualized mastoids, sinuses and orbits are unremarkable.              ekg;< from: 12 Lead ECG (18 @ 08:05) >  Diagnosis Line Sinus tachycardia  T wave abnormality, consider lateral ischemia      ECHO: < from: Transthoracic Echocardiogram (18 @ 13:31) >  1. Normal mitral valve.  2. Normal trileaflet aortic valve.  3. Aortic Root: 3.4 cm.  4. Normal left atrium.  5. Normal left ventricular internal dimensions and wall  thicknesses.  6. Moderate segmental left ventricular systolic  dysfunction.The apex,mid and distal inferior,septum  and  anterior walls are hypokinetic  7. Grade IV diastolic dysfunction.  8. Normal right atrium.  9. Normal right ventricular size and function.  10. RV systolic pressure is31 mm Hg.  11. Normal tricuspid valve.  12. Normal pulmonic valve.  13. Normal pericardium with no pericardial effusion.    < end of copied text >

## 2018-09-10 NOTE — DIETITIAN INITIAL EVALUATION ADULT. - MD RECOMMEND
If  NG tube placed, will follow with tube feeding recommendations accordingly, if not will monitor for diet advancement

## 2018-09-10 NOTE — PROGRESS NOTE ADULT - PROBLEM SELECTOR PLAN 6
-Home meds includes Basaglar 38 units at bedtime, Humalog 5 units pre meals and correctional scale   -Would only start on Humalog correction scale for now  -Monitor fs every 6 hours for now and adjust regimen as needed  -Started Lantus 10U HS  -Hba1c is 7.6 -Home meds includes Basaglar 38 units at bedtime, Humalog 5 units pre meals and correctional scale   -Would only start on Humalog correction scale for now  -Monitor fs every 6 hours for now and adjust regimen as needed  -Started Lantus 10U HS  -Hba1c is 7.6  endocrine Dr evangelista following **

## 2018-09-10 NOTE — PROGRESS NOTE ADULT - SUBJECTIVE AND OBJECTIVE BOX
INTERVAL HPI/OVERNIGHT EVENTS:  agitation over night      Antimicrobial:  cefepime   IVPB      cefepime   IVPB 2000 milliGRAM(s) IV Intermittent every 8 hours  vancomycin  IVPB 1000 milliGRAM(s) IV Intermittent every 12 hours    Cardiovascular:  norepinephrine Infusion 0.5 MICROgram(s)/kG/Min IV Continuous <Continuous>    Pulmonary:    Hematalogic:    Other:  acetaminophen    Suspension .. 650 milliGRAM(s) Oral every 6 hours PRN  artificial  tears Solution 1 Drop(s) Both EYES two times a day  dexmedetomidine Infusion 0.2 MICROgram(s)/kG/Hr IV Continuous <Continuous>  dextrose 50% Injectable 25 Gram(s) IV Push once  ferrous    sulfate Liquid 300 milliGRAM(s) Oral daily  insulin glargine Injectable (LANTUS) 10 Unit(s) SubCutaneous at bedtime  insulin lispro (HumaLOG) corrective regimen sliding scale   SubCutaneous every 6 hours  pantoprazole  Injectable 40 milliGRAM(s) IV Push two times a day  simvastatin 20 milliGRAM(s) Oral at bedtime      Drug Dosing Weight    Weight (kg): 66.6 (08 Sep 2018 11:22)    CENTRAL LINE: [s ] YES [ ] NO  LOCATION:   DATE INSERTED:    JENSEN: s ] YES [ ] NO    DATE INSERTED:    A-LINE:  [ ] YES [ ] NO  LOCATION:   DATE INSERTED:    PMH/Social Hx/Fam Hx -reviewed admission note, no change since admission  PAST MEDICAL & SURGICAL HISTORY:  Cardiac disease  DM (diabetes mellitus)  HTN (hypertension)  H/O cervical spine surgery      T(C): 38.3 (09-10-18 @ 11:30), Max: 38.6 (18 @ 15:30)  HR: 108 (09-10-18 @ 12:30)  BP: 122/65 (09-10-18 @ 12:30)  BP(mean): 79 (09-10-18 @ 12:30)  ABP: --  ABP(mean): --  RR: 12 (09-10-18 @ 12:30)  SpO2: 100% (09-10-18 @ 12:30)  Wt(kg): --    ABG - ( 10 Sep 2018 04:26 )  pH, Arterial: 7.35  pH, Blood: x     /  pCO2: 36    /  pO2: 119   / HCO3: 20    / Base Excess: -5.0  /  SaO2: 99                     @ 07:01  -  09-10 @ 07:00  --------------------------------------------------------  IN: 2025.6 mL / OUT: 2685 mL / NET: -659.4 mL        Mode: AC/ CMV (Assist Control/ Continuous Mandatory Ventilation)  RR (machine): 12  TV (machine): 450  FiO2: 40  PEEP: 5  ITime: 1  MAP: 8  PIP: 27      PHYSICAL EXAM:    GENERAL: No signs of distress, comfortable  HEAD: Atraumatic, Normocephalic  EYES: EOMI, PERRLA  ENMT: No erythema, exudates, or enlargement, Moist mucous membranes +ETT  NECK: Supple, normal appearance, No JVD; [  ] central line (if applicable)  CHEST/LUNG: No chest deformity, fair bilateral air entry; No rales, rhonchi, wheezing; crackles  HEART: Regular rate and rhythm; No murmurs, rubs, or gallops;   ABDOMEN: Soft, Nontender, Nondistended; Bowel sounds present  EXTREMITIES:  + Peripheral Pulses, No clubbing, cyanosis, or edema  NERVOUS SYSTEM: awake and alert x 3, follows commands, upper and lower extremities  LYMPH: No lymphadenopathy noted  SKIN: No rashes or lesions; good turgor, warm, dry      LABS:  CBC Full  -  ( 10 Sep 2018 06:13 )  WBC Count : 15.0 K/uL  Hemoglobin : 11.6 g/dL  Hematocrit : 35.6 %  Platelet Count - Automated : 227 K/uL  Mean Cell Volume : 86.7 fl  Mean Cell Hemoglobin : 28.4 pg  Mean Cell Hemoglobin Concentration : 32.8 gm/dL  Auto Neutrophil # : 11.3 K/uL  Auto Lymphocyte # : 2.5 K/uL  Auto Monocyte # : 0.8 K/uL  Auto Eosinophil # : 0.3 K/uL  Auto Basophil # : 0.1 K/uL  Auto Neutrophil % : 75.0 %  Auto Lymphocyte % : 16.5 %  Auto Monocyte % : 5.6 %  Auto Eosinophil % : 2.1 %  Auto Basophil % : 0.8 %    09-10    140  |  111<H>  |  6<L>  ----------------------------<  142<H>  3.9   |  21<L>  |  0.58    Ca    7.5<L>      10 Sep 2018 06:13  Phos  1.1     -10  Mg     1.6     09-10    TPro  5.9<L>  /  Alb  2.2<L>  /  TBili  0.4  /  DBili  x   /  AST  14  /  ALT  22  /  AlkPhos  57  09-10    PT/INR - ( 09 Sep 2018 00:36 )   PT: 13.8 sec;   INR: 1.26 ratio           Urinalysis Basic - ( 08 Sep 2018 15:09 )    Color: Yellow / Appearance: Clear / S.010 / pH: x  Gluc: x / Ketone: Trace  / Bili: Negative / Urobili: Negative   Blood: x / Protein: 100 / Nitrite: Negative   Leuk Esterase: Trace / RBC: 0-2 /HPF / WBC 0-2 /HPF   Sq Epi: x / Non Sq Epi: Few /HPF / Bacteria: Negative /HPF      Culture Results:   No growth to date. ( @ 11:37)      RADIOLOGY & ADDITIONAL STUDIES REVIEWED         IMPRESSION: cefepime   IVPB      cefepime   IVPB 2000 milliGRAM(s) IV Intermittent every 8 hours  vancomycin  IVPB 1000 milliGRAM(s) IV Intermittent every 12 hours   norepinephrine Infusion 0.5 MICROgram(s)/kG/Min IV Continuous <Continuous>   PAST MEDICAL & SURGICAL HISTORY:  Cardiac disease  DM (diabetes mellitus)  HTN (hypertension)  H/O cervical spine surgery   p/w       IMP: This is a 57 yr old woman with prior mentioned medical found unresponsive due to hypoglycemia in rehab center. According to NH staff .. seems like pat took her own meds and also was given meds NH staff.  She was intubated for airway protection. Mental  status improved. CXR demonstrated possible new b/l infiltrate due to asp pna . Overnight TLC placed for septic shock, started on pressors. Endo consult noted .. lantus and humolog.   Plan:        Plan:    CNS: d/c all sedtio. precedex as needed    PULMONARY: continue vent support    CARDIAC: monitor    GI: NGT feed    RENAL: keep jensen,    SKIN: no issue    MUSCULOSKELETAL:    ID: continue antibx, f/u cultures    HEME: no issue    ENDO: monitor FS, insulin as per indo    DVT and GI Prophylaxis    GOALS OF CARE DISCUSSION WITH PATIENT/FAMILY/PROXY    CRITICAL CARE TIME SPENT: 35 minutes

## 2018-09-10 NOTE — DIETITIAN INITIAL EVALUATION ADULT. - PROBLEM SELECTOR PLAN 5
-Family not sure about exact diagnosis  -c/w aspirin, statin  -Holding Coreg, Losartan and HCTZ for now, restart as appropriate.  -f/u ECHO.

## 2018-09-10 NOTE — PROGRESS NOTE ADULT - SUBJECTIVE AND OBJECTIVE BOX
KAILASH JACQUELIN  MRN-[id number]    Patient is a 57y old  Female who presents with a chief complaint of unresponsiveness (08 Sep 2018 13:02)  patient seen and examined in icu    s still  remains on vent ,     .MEDICATIONS  (STANDING):  artificial  tears Solution 1 Drop(s) Both EYES two times a day  cefepime   IVPB      cefepime   IVPB 2000 milliGRAM(s) IV Intermittent every 8 hours  dextrose 5%. 1000 milliLiter(s) (50 mL/Hr) IV Continuous <Continuous>  dextrose 50% Injectable 12.5 Gram(s) IV Push once  dextrose 50% Injectable 25 Gram(s) IV Push once  dextrose 50% Injectable 25 Gram(s) IV Push once  fentaNYL   Infusion. 0.5 MICROgram(s)/kG/Hr (3.33 mL/Hr) IV Continuous <Continuous>  ferrous    sulfate Liquid 300 milliGRAM(s) Oral daily  insulin glargine Injectable (LANTUS) 10 Unit(s) SubCutaneous at bedtime  insulin lispro (HumaLOG) corrective regimen sliding scale   SubCutaneous every 6 hours  norepinephrine Infusion 0.5 MICROgram(s)/kG/Min (31.219 mL/Hr) IV Continuous <Continuous>  pantoprazole  Injectable 40 milliGRAM(s) IV Push two times a day  potassium phosphate IVPB 15 milliMole(s) IV Intermittent once  simvastatin 20 milliGRAM(s) Oral at bedtime  vancomycin  IVPB 1000 milliGRAM(s) IV Intermittent every 12 hours    MEDICATIONS  (PRN):  acetaminophen    Suspension .. 650 milliGRAM(s) Oral every 6 hours PRN Temp greater or equal to 38C (100.4F), Mild Pain (1 - 3)  dextrose 40% Gel 15 Gram(s) Oral once PRN Blood Glucose LESS THAN 70 milliGRAM(s)/deciliter  glucagon  Injectable 1 milliGRAM(s) IntraMuscular once PRN Glucose LESS THAN 70 milligrams/deciliter  LORazepam   Injectable 2 milliGRAM(s) IV Push every 4 hours PRN Agitation  morphine  - Injectable 2 milliGRAM(s) IV Push every 4 hours PRN Severe Pain (7 - 10)      Allergies    No Known Allergies    Intolerances        PAST MEDICAL & SURGICAL HISTORY:  Cardiac disease  DM (diabetes mellitus)  HTN (hypertension)  H/O cervical spine surgery      FAMILY HISTORY:  Family history unknown      SOCIAL HISTORY  Smoking History:     REVIEW OF SYSTEMS:    CONSTITUTIONAL:  Fevers [ x]  Yes  [  ]  No                                   chills [  ]  Yes  [X  ]  No                               sweats  [  ]  Yes  [ X ]  No                                fatigue [  ]  Yes  [ X ]  No    HEENT:  Eyes:  Diplopia or blurred vision [  ]  Yes  [  ]  No    ENT:                        earache  [  ]  Yes  [  ]  No                             sore throat  [  ]  Yes  [  ]  No                            runny nose.  [  ]  Yes  [  ]  No    CARDIOVASCULAR:       chest pain  [  ]  Yes  [  ]  No                        squeezing, tightness,  [  ]  Yes  [  ]  No                                      palpitations.  [  ]  Yes  [  ]  No    RESPIRATORY:             Cough [  ]  Yes  [  ]  No                                  Wheezing [  ]  Yes  [  ]  No                                Hemoptysis [  ]  Yes  [  ]  No                                      Sputum [  ]  Yes  [  ]  No                   Shortness of Breathe [  ]  Yes  [  ]  No    GASTROINTESTINAL:      Abdominal pain  [  ]  Yes  [  ]  No                                                    Nausea  [  ]  Yes  [  ]  No                                                   Vomiting [  ]  Yes  [  ]  No                                              Constipation [  ]  Yes  [  ]  No                                                    Diarrhea [  ]  Yes  [  ]  No    GENITOURINARY:           Incontinence [  ]  Yes  [  ]  No                                                Dysuria [  ]  Yes  [  ]  No                                      Blood in Urine  [  ]  Yes  [  ]  No                          Frequency or urgency.  [  ]  Yes  [  ]  No    NEUROLOGIC:   lethargic  AT PRESENT                  Paresthesias  [  ]  Yes  [  ]  No                                             fasciculations  [  ]  Yes  [  ]  No                                seizures or weakness.  [  ]  Yes  [  ]  No                                                   Headache [  ]  Yes  [  ]  No                                  Loss of Conciousness [  ]  Yes  [  ]  No                                                     Insomnia [  ]  Yes  [  ]  No    PSYCHIATRIC:    Disorder of thought or mood.  [  ]  Yes  [  ]  No                                                           Anxiety [  ]  Yes  [  ]  No                                                      Depression [  ]  Yes  [  ]  No                                                         Dementia [  ]  Yes  [  ]  No  .    ICU Vital Signs Last 24 Hrs  T(C): 37.8 (10 Sep 2018 07:30), Max: 38.6 (09 Sep 2018 15:30)  T(F): 100.1 (10 Sep 2018 07:30), Max: 101.4 (09 Sep 2018 15:30)  HR: 108 (10 Sep 2018 08:24) (90 - 138)  BP: 107/60 (10 Sep 2018 08:00) (88/52 - 169/92)  BP(mean): 70 (10 Sep 2018 08:00) (60 - 110)  ABP: --  ABP(mean): --  RR: 12 (10 Sep 2018 08:00) (12 - 21)  SpO2: 100% (10 Sep 2018 08:24) (93% - 100%)        PHYSICAL EXAMINATION:    GENERAL: The patient is a well-developed, well-nourished _____in no apparent distress.     HEENT:  ON VENT     NECK: Supple. jvd [  ]  Yes  [  X]  No    LUNGS: Clear to auscultation  [  X]  Yes  [  ]  No                               wheezing, [  ]  Yes  [X  ]  No                                      rales  [  ]  Yes  [  ]X  No                                    rhonchi  [ x ]  Yes  [  ]  No                 Respirations labored  [  ]  Yes  [  ]  No    HEART: Regular rate and rhythm  [ X ]  Yes  [  ]  No                                        Murmur [  ]  Yes  [  X]  No                                              rub  [  ]  Yes  [ X ]  No                                          gallop  [  ]  Yes  [  ]X  No    ABDOMEN:                                 Soft, X[  ]  Yes  [  ]  No                                             nontender [X  ]  Yes  [  ]  No                                             distended.[  ]  Yes  [X  ]  No                            hepatosplenomegaly ,[  ]  Yes  [ X ]  No                                                    BS   [  ]  Yes  [  ]  No    EXTREMITIES:                cyanosis, [  ]  Yes  [ X ]  No                                       clubbing,   [  ]  Yes  [  X]  No                                               rash, [  ]  Yes  [ X ]  No                                           edema.  [  ]  Yes  [ X ]  No    NEUROLOGIC:   UN RESPONSIVE .  LABS:                        11.6   15.0  )-----------( 227      ( 10 Sep 2018 06:13 )             35.6     09-10    140  |  111<H>  |  6<L>  ----------------------------<  142<H>  3.9   |  21<L>  |  0.58    Ca    7.5<L>      10 Sep 2018 06:13  Phos  1.1     -10  Mg     1.6     -10    TPro  5.9<L>  /  Alb  2.2<L>  /  TBili  0.4  /  DBili  x   /  AST  14  /  ALT  22  /  AlkPhos  57  09-10    PT/INR - ( 09 Sep 2018 00:36 )   PT: 13.8 sec;   INR: 1.26 ratio           Urinalysis Basic - ( 08 Sep 2018 15:09 )    Color: Yellow / Appearance: Clear / S.010 / pH: x  Gluc: x / Ketone: Trace  / Bili: Negative / Urobili: Negative   Blood: x / Protein: 100 / Nitrite: Negative   Leuk Esterase: Trace / RBC: 0-2 /HPF / WBC 0-2 /HPF   Sq Epi: x / Non Sq Epi: Few /HPF / Bacteria: Negative /HPF      ABG - ( 10 Sep 2018 04:26 )  pH, Arterial: 7.35  pH, Blood: x     /  pCO2: 36    /  pO2: 119   / HCO3: 20    / Base Excess: -5.0  /  SaO2: 99                                LABS:                        11.4   13.4  )-----------( 219      ( 09 Sep 2018 09:38 )             33.7         141  |  112<H>  |  15  ----------------------------<  172<H>  3.4<L>   |  20<L>  |  0.74    Ca    6.7<L>      09 Sep 2018 00:36    TPro  8.3  /  Alb  3.7  /  TBili  <0.1<L>  /  DBili  x   /  AST  25  /  ALT  44  /  AlkPhos  85  08    PT/INR - ( 09 Sep 2018 00:36 )   PT: 13.8 sec;   INR: 1.26 ratio         PTT - ( 08 Sep 2018 08:27 )  PTT:34.2 sec  Urinalysis Basic - ( 08 Sep 2018 15:09 )    Color: Yellow / Appearance: Clear / S.010 / pH: x  Gluc: x / Ketone: Trace  / Bili: Negative / Urobili: Negative   Blood: x / Protein: 100 / Nitrite: Negative   Leuk Esterase: Trace / RBC: 0-2 /HPF / WBC 0-2 /HPF   Sq Epi: x / Non Sq Epi: Few /HPF / Bacteria: Negative /HPF      ABG - ( 09 Sep 2018 04:43 )  pH, Arterial: 7.29  pH, Blood: x     /  pCO2: 40    /  pO2: 108   / HCO3: 19    / Base Excess: -6.7  /  SaO2: 98                CARDIAC MARKERS ( 08 Sep 2018 08:27 )  <0.015 ng/mL / x     / x     / x     / x        < from: Xray Chest 1 View- PORTABLE-Routine (18 @ 10:01) >  IMPRESSION:  Mild pulmonary venous congestion.  .    < from: CT Abdomen and Pelvis No Cont (18 @ 03:24) >  IMPRESSION:  No retroperitoneal hemorrhage. No bowel obstruction.    Extensive bilateral lower lobe consolidation. Differential diagnosis   includes multifocal pneumonia, aspiration pneumonia, alveolar hemorrhage.    Mild pulmonary venous congestion.        < end of copied text >      Lactate, Blood: 1.3 mmol/L (18 @ 00:36)          LABS:                        11.6   11.3  )-----------( 329      ( 08 Sep 2018 08:27 )             35.5         136  |  104  |  20<H>  ----------------------------<  151<H>  4.6   |  23  |  0.77    Ca    8.9      08 Sep 2018 08:27    TPro  8.3  /  Alb  3.7  /  TBili  <0.1<L>  /  DBili  x   /  AST  25  /  ALT  44  /  AlkPhos  85      PT/INR - ( 08 Sep 2018 08:27 )   PT: 10.1 sec;   INR: 0.93 ratio         PTT - ( 08 Sep 2018 08:27 )  PTT:34.2 sec  Urinalysis Basic - ( 08 Sep 2018 15:09 )    Color: Yellow / Appearance: Clear / S.010 / pH: x  Gluc: x / Ketone: Trace  / Bili: Negative / Urobili: Negative   Blood: x / Protein: 100 / Nitrite: Negative   Leuk Esterase: Trace / RBC: 0-2 /HPF / WBC 0-2 /HPF   Sq Epi: x / Non Sq Epi: Few /HPF / Bacteria: Negative /HPF      ABG - ( 08 Sep 2018 10:08 )  pH, Arterial: 7.40  pH, Blood: x     /  pCO2: 36    /  pO2: 92    / HCO3: 22    / Base Excess: -1.7  /  SaO2: 97                CARDIAC MARKERS ( 08 Sep 2018 08:27 )  <0.015 ng/mL / x     / x     / x     / x              Lactate, Blood: 0.6 mmol/L (18 @ 08:27)        MICROBIOLOGY:    RADIOLOGY & ADDITIONAL STUDIES:    CXR:  < from: Xray Chest 1 View AP/PA (18 @ 10:06) >  Impression: Successful placement of ET tube and NG tube  Question of developing infiltrate at the right lung base.      < from: CT Head No Cont (18 @ 07:55) >  IMPRESSION:     Limited study demonstrates no obvious abnormality as described above.            < end of copied text >    < end of copied text >    Ct scan chest:    ekg;    echo:

## 2018-09-10 NOTE — PROGRESS NOTE ADULT - PROBLEM SELECTOR PLAN 4
-most likely secondary to hypoglycemia as patient was noted to be hypoglycemic to 40s last night at rehab, s/p Glucagon; fs in   -CT head negative for any acute event  -EKG: sinus tachycardia.   -CXR clear, lactate 0.6, UA is negative, s/p 1 dose of Zosyn in ED  -Blood alcohol <3, serum acetaminophen and salicylate level wnl.   -Utox positive for benzodiazepines (takes valium 5 mg q12)  -Could be CVA v/s seizure, consider MRI when stable  -Continue with vent support, aspiration precautions  -Monitor blood sugars every 6 hours for now  -c/w fentanyl for sedation   -IV hydration -most likely secondary to hypoglycemia as patient was noted to be hypoglycemic to 40s last night at rehab, s/p Glucagon; fs in   -CT head negative for any acute event  -EKG: sinus tachycardia.   -CXR clear, lactate 0.6, UA is negative, s/p 1 dose of Zosyn in ED  -Blood alcohol <3, serum acetaminophen and salicylate level wnl.   -Utox positive for benzodiazepines (takes valium 5 mg q12)  -Could be CVA v/s seizure, consider MRI when stable  -Continue with vent support, aspiration precautions  sedation with precedex. Will avoid morphine and ativan. Dc fentanyl** on 9/10  -Monitor blood sugars every 6 hours for now  -IV hydration

## 2018-09-10 NOTE — DIETITIAN INITIAL EVALUATION ADULT. - PROBLEM SELECTOR PLAN 3
-Home meds includes Basaglar 38 units at bedtime, Humalog 5 units pre meals and correctional scale   -Would only start on Humalog correction scale for now  -Monitor fs every 2 hours for now and adjust regimen as needed  -f/u HbA1c.

## 2018-09-10 NOTE — PROGRESS NOTE ADULT - SUBJECTIVE AND OBJECTIVE BOX
Patient is seen and examined at the bed side, is afebrile.      REVIEW OF SYSTEMS: Unable to obtain since intubated and not responsive        ALLERGIES: NKDA        ICU Vital Signs Last 24 Hrs  T(C): 37.3 (10 Sep 2018 16:00), Max: 38.3 (10 Sep 2018 11:30)  T(F): 99.1 (10 Sep 2018 16:00), Max: 101 (10 Sep 2018 11:30)  HR: 85 (10 Sep 2018 17:30) (82 - 133)  BP: 111/62 (10 Sep 2018 17:00) (89/51 - 164/97)  BP(mean): 73 (10 Sep 2018 17:00) (61 - 110)  ABP: --  ABP(mean): --  RR: 12 (10 Sep 2018 17:30) (12 - 21)  SpO2: 100% (10 Sep 2018 17:30) (97% - 100%)        PHYSICAL EXAM:  GENERAL: Intubated/vented  CVS: s1 and s2 present  RESP: Air entry B/L  GI: Abdomen non distended and Nontender  EXT: No pedal edema   CNS: Intubated /vented        LABS:                        11.6   15.0  )-----------( 227      ( 10 Sep 2018 06:13 )             35.6                           11.4   13.4  )-----------( 219      ( 09 Sep 2018 09:38 )             33.7         09-10    140  |  111<H>  |  6<L>  ----------------------------<  142<H>  3.9   |  21<L>  |  0.58    Ca    7.5<L>      10 Sep 2018 06:13  Phos  2.1     10  Mg     1.5     10    TPro  5.9<L>  /  Alb  2.2<L>  /  TBili  0.4  /  DBili  x   /  AST  14  /  ALT  22  /  AlkPhos  57  09-10    09-09    141  |  112<H>  |  15  ----------------------------<  172<H>  3.4<L>   |  20<L>  |  0.74    Ca    6.7<L>      09 Sep 2018 00:36    TPro  8.3  /  Alb  3.7  /  TBili  <0.1<L>  /  DBili  x   /  AST  25  /  ALT  44  /  AlkPhos  85      PT/INR - ( 09 Sep 2018 00:36 )   PT: 13.8 sec;   INR: 1.26 ratio         PTT - ( 08 Sep 2018 08:27 )  PTT:34.2 sec  Urinalysis Basic - ( 08 Sep 2018 15:09 )    Color: Yellow / Appearance: Clear / S.010 / pH: x  Gluc: x / Ketone: Trace  / Bili: Negative / Urobili: Negative   Blood: x / Protein: 100 / Nitrite: Negative   Leuk Esterase: Trace / RBC: 0-2 /HPF / WBC 0-2 /HPF   Sq Epi: x / Non Sq Epi: Few /HPF / Bacteria: Negative /HPF      CAPILLARY BLOOD GLUCOSE      POCT Blood Glucose.: 166 mg/dL (09 Sep 2018 17:59)  POCT Blood Glucose.: 257 mg/dL (09 Sep 2018 13:45)  POCT Blood Glucose.: 301 mg/dL (09 Sep 2018 12:04)  POCT Blood Glucose.: 289 mg/dL (09 Sep 2018 06:24)  POCT Blood Glucose.: 198 mg/dL (08 Sep 2018 23:46)  POCT Blood Glucose.: 168 mg/dL (08 Sep 2018 22:29)  POCT Blood Glucose.: 163 mg/dL (08 Sep 2018 20:12)  POCT Blood Glucose.: 142 mg/dL (08 Sep 2018 18:59)    ABG - ( 09 Sep 2018 04:43 )  pH, Arterial: 7.29  pH, Blood: x     /  pCO2: 40    /  pO2: 108   / HCO3: 19    / Base Excess: -6.7  /  SaO2: 98            MEDICATIONS  (STANDING):  artificial  tears Solution 1 Drop(s) Both EYES two times a day  cefepime   IVPB      cefepime   IVPB 2000 milliGRAM(s) IV Intermittent every 8 hours  dexmedetomidine Infusion 0.2 MICROgram(s)/kG/Hr (3.33 mL/Hr) IV Continuous <Continuous>  dextrose 50% Injectable 25 Gram(s) IV Push once  ferrous    sulfate Liquid 300 milliGRAM(s) Oral daily  insulin glargine Injectable (LANTUS) 10 Unit(s) SubCutaneous at bedtime  insulin lispro (HumaLOG) corrective regimen sliding scale   SubCutaneous every 6 hours  norepinephrine Infusion 0.5 MICROgram(s)/kG/Min (31.219 mL/Hr) IV Continuous <Continuous>  pantoprazole  Injectable 40 milliGRAM(s) IV Push two times a day  simvastatin 20 milliGRAM(s) Oral at bedtime  vancomycin  IVPB 1000 milliGRAM(s) IV Intermittent every 12 hours    MEDICATIONS  (PRN):  acetaminophen    Suspension .. 650 milliGRAM(s) Oral every 6 hours PRN Temp greater or equal to 38C (100.4F), Mild Pain (1 - 3)        RADIOLOGY & ADDITIONAL TESTS:    9/10/18: Xray Chest 1 View- PORTABLE-Routine (09.10.18 @ 06:59) ET and enteric tubes, and right IJ catheter remain in place. No pneumothorax. Small layering left pleural effusion has increased. Small right pleural effusion is unchanged. Mild pulmonary vascular congestion and scattered  lung opacities are similar to the prior study.    18 : Xray Chest 1 View- PORTABLE-Routine (18 @ 10:01) Mild pulmonary venous congestion, increased since the prior study. No pleural effusions . Hazy opacities in the lower lobes could   represent atelectasis or infiltrates.      18 : CT Abdomen and Pelvis No Cont (18 @ 03:24) No retroperitoneal hemorrhage. No bowel obstruction. Extensive bilateral lower lobe consolidation. Differential diagnosis   includes multifocal pneumonia, aspiration pneumonia, alveolar hemorrhage. Mild pulmonary venous congestion.      18 : CT Head No Cont (18 @ 07:55) There is no evidence of hydrocephalus, mass effect or any sizable extra-axial collection. Due to motion artifact, subtle abnormalities       MICROBIOLOGY DATA:    Culture - Sputum . (09.10.18 @ 09:54)    Gram Stain:   No polymorphonuclear leukocytes per low power field  No Squamous epithelial cells per low power field  No organisms seen per oil power field    Specimen Source: .Sputum Sputum, trap    Rapid Respiratory Viral Panel (18 @ 18:24)    Rapid RVP Result: NotDetec: The FilmArray RVP Rapid uses polymerase chain reaction (PCR) and melt  curve analysis to screen for adenovirus; coronavirus HKU1, NL63, 229E,  OC43; human metapneumovirus (hMPV); human enterovirus/rhinovirus  (Entero/RV); influenza A; influenza A/H1;influenza A/H3; influenza  A/H1-2009; influenza B; parainfluenza viruses 1, 2, 3, 4; respiratory  syncytial virus; Bordetella pertussis; Mycoplasma pneumoniae; and  Chlamydophila pneumoniae.      Culture - Blood (18 @ 11:37)    Specimen Source: .Blood Blood-Peripheral    Culture Results:   No growth to date. Patient is seen and examined at the bed side, is afebrile now. She remains intubated and on pressors.  The Leukocytosis is worsening.      REVIEW OF SYSTEMS: Unable to obtain since intubated and not responsive        ALLERGIES: NKDA      ICU Vital Signs Last 24 Hrs  T(C): 37.3 (10 Sep 2018 16:00), Max: 38.3 (10 Sep 2018 11:30)  T(F): 99.1 (10 Sep 2018 16:00), Max: 101 (10 Sep 2018 11:30)  HR: 85 (10 Sep 2018 17:30) (82 - 133)  BP: 111/62 (10 Sep 2018 17:00) (89/51 - 164/97)  BP(mean): 73 (10 Sep 2018 17:00) (61 - 110)  ABP: --  ABP(mean): --  RR: 12 (10 Sep 2018 17:30) (12 - 21)  SpO2: 100% (10 Sep 2018 17:30) (97% - 100%)        PHYSICAL EXAM:  GENERAL: Intubated/vented  CVS: s1 and s2 present  RESP: Air entry B/L  GI: Abdomen non distended and Nontender  EXT: No pedal edema   CNS: Intubated /vented        LABS:                        11.6   15.0  )-----------( 227      ( 10 Sep 2018 06:13 )             35.6                           11.4   13.4  )-----------( 219      ( 09 Sep 2018 09:38 )             33.7         09-10    140  |  111<H>  |  6<L>  ----------------------------<  142<H>  3.9   |  21<L>  |  0.58    Ca    7.5<L>      10 Sep 2018 06:13  Phos  2.1     09-10  Mg     1.5     09-10    TPro  5.9<L>  /  Alb  2.2<L>  /  TBili  0.4  /  DBili  x   /  AST  14  /  ALT  22  /  AlkPhos  57  09-10    09-09    141  |  112<H>  |  15  ----------------------------<  172<H>  3.4<L>   |  20<L>  |  0.74    Ca    6.7<L>      09 Sep 2018 00:36    TPro  8.3  /  Alb  3.7  /  TBili  <0.1<L>  /  DBili  x   /  AST  25  /  ALT  44  /  AlkPhos  85  09-08    PT/INR - ( 09 Sep 2018 00:36 )   PT: 13.8 sec;   INR: 1.26 ratio         PTT - ( 08 Sep 2018 08:27 )  PTT:34.2 sec  Urinalysis Basic - ( 08 Sep 2018 15:09 )    Color: Yellow / Appearance: Clear / S.010 / pH: x  Gluc: x / Ketone: Trace  / Bili: Negative / Urobili: Negative   Blood: x / Protein: 100 / Nitrite: Negative   Leuk Esterase: Trace / RBC: 0-2 /HPF / WBC 0-2 /HPF   Sq Epi: x / Non Sq Epi: Few /HPF / Bacteria: Negative /HPF      CAPILLARY BLOOD GLUCOSE      POCT Blood Glucose.: 166 mg/dL (09 Sep 2018 17:59)  POCT Blood Glucose.: 257 mg/dL (09 Sep 2018 13:45)  POCT Blood Glucose.: 301 mg/dL (09 Sep 2018 12:04)  POCT Blood Glucose.: 289 mg/dL (09 Sep 2018 06:24)  POCT Blood Glucose.: 198 mg/dL (08 Sep 2018 23:46)  POCT Blood Glucose.: 168 mg/dL (08 Sep 2018 22:29)  POCT Blood Glucose.: 163 mg/dL (08 Sep 2018 20:12)  POCT Blood Glucose.: 142 mg/dL (08 Sep 2018 18:59)    ABG - ( 09 Sep 2018 04:43 )  pH, Arterial: 7.29  pH, Blood: x     /  pCO2: 40    /  pO2: 108   / HCO3: 19    / Base Excess: -6.7  /  SaO2: 98            MEDICATIONS  (STANDING):  artificial  tears Solution 1 Drop(s) Both EYES two times a day  cefepime   IVPB      cefepime   IVPB 2000 milliGRAM(s) IV Intermittent every 8 hours  dexmedetomidine Infusion 0.2 MICROgram(s)/kG/Hr (3.33 mL/Hr) IV Continuous <Continuous>  dextrose 50% Injectable 25 Gram(s) IV Push once  ferrous    sulfate Liquid 300 milliGRAM(s) Oral daily  insulin glargine Injectable (LANTUS) 10 Unit(s) SubCutaneous at bedtime  insulin lispro (HumaLOG) corrective regimen sliding scale   SubCutaneous every 6 hours  norepinephrine Infusion 0.5 MICROgram(s)/kG/Min (31.219 mL/Hr) IV Continuous <Continuous>  pantoprazole  Injectable 40 milliGRAM(s) IV Push two times a day  simvastatin 20 milliGRAM(s) Oral at bedtime  vancomycin  IVPB 1000 milliGRAM(s) IV Intermittent every 12 hours    MEDICATIONS  (PRN):  acetaminophen    Suspension .. 650 milliGRAM(s) Oral every 6 hours PRN Temp greater or equal to 38C (100.4F), Mild Pain (1 - 3)        RADIOLOGY & ADDITIONAL TESTS:    9/10/18: Xray Chest 1 View- PORTABLE-Routine (09.10.18 @ 06:59) ET and enteric tubes, and right IJ catheter remain in place. No pneumothorax. Small layering left pleural effusion has increased. Small right pleural effusion is unchanged. Mild pulmonary vascular congestion and scattered  lung opacities are similar to the prior study.    18 : Xray Chest 1 View- PORTABLE-Routine (18 @ 10:01) Mild pulmonary venous congestion, increased since the prior study. No pleural effusions . Hazy opacities in the lower lobes could   represent atelectasis or infiltrates.      18 : CT Abdomen and Pelvis No Cont (18 @ 03:24) No retroperitoneal hemorrhage. No bowel obstruction. Extensive bilateral lower lobe consolidation. Differential diagnosis   includes multifocal pneumonia, aspiration pneumonia, alveolar hemorrhage. Mild pulmonary venous congestion.      18 : CT Head No Cont (18 @ 07:55) There is no evidence of hydrocephalus, mass effect or any sizable extra-axial collection. Due to motion artifact, subtle abnormalities       MICROBIOLOGY DATA:    Culture - Sputum . (09.10.18 @ 09:54)    Gram Stain:   No polymorphonuclear leukocytes per low power field  No Squamous epithelial cells per low power field  No organisms seen per oil power field    Specimen Source: .Sputum Sputum, trap    Rapid Respiratory Viral Panel (18 @ 18:24)    Rapid RVP Result: NotDetec: The FilmArray RVP Rapid uses polymerase chain reaction (PCR) and melt  curve analysis to screen for adenovirus; coronavirus HKU1, NL63, 229E,  OC43; human metapneumovirus (hMPV); human enterovirus/rhinovirus  (Entero/RV); influenza A; influenza A/H1;influenza A/H3; influenza  A/H1-2009; influenza B; parainfluenza viruses 1, 2, 3, 4; respiratory  syncytial virus; Bordetella pertussis; Mycoplasma pneumoniae; and  Chlamydophila pneumoniae.      Culture - Blood (18 @ 11:37)    Specimen Source: .Blood Blood-Peripheral    Culture Results:   No growth to date.

## 2018-09-10 NOTE — CONSULT NOTE ADULT - ASSESSMENT
Ac Respiratory Failure sec to encephalopathy ? drug induced like Valium, percocet s/p hypoglycemia r/o CVA  Hcvd with CHF  DM  Spine surgery      Plan; D/c fluids  ACEI, Lasix, Neutraphos  Cpap trial with fio2 35%  s/C Lovenox  FS coverage  Hold strong analgesics and sedatives  Thanks for consult

## 2018-09-10 NOTE — PROGRESS NOTE ADULT - SUBJECTIVE AND OBJECTIVE BOX
INTERVAL HPI/OVERNIGHT EVENTS: *** No events.    PRESSORS: [ ] YES [x ] NO      Antimicrobial:  cefepime   IVPB      cefepime   IVPB 2000 milliGRAM(s) IV Intermittent every 8 hours  vancomycin  IVPB 1000 milliGRAM(s) IV Intermittent every 12 hours    Cardiovascular:  norepinephrine Infusion 0.5 MICROgram(s)/kG/Min IV Continuous <Continuous>    Pulmonary:    Hematalogic:    Other:  acetaminophen    Suspension .. 650 milliGRAM(s) Oral every 6 hours PRN  artificial  tears Solution 1 Drop(s) Both EYES two times a day  dextrose 40% Gel 15 Gram(s) Oral once PRN  dextrose 5%. 1000 milliLiter(s) IV Continuous <Continuous>  dextrose 50% Injectable 12.5 Gram(s) IV Push once  dextrose 50% Injectable 25 Gram(s) IV Push once  dextrose 50% Injectable 25 Gram(s) IV Push once  fentaNYL   Infusion. 0.5 MICROgram(s)/kG/Hr IV Continuous <Continuous>  ferrous    sulfate Liquid 300 milliGRAM(s) Oral daily  glucagon  Injectable 1 milliGRAM(s) IntraMuscular once PRN  insulin glargine Injectable (LANTUS) 10 Unit(s) SubCutaneous at bedtime  insulin lispro (HumaLOG) corrective regimen sliding scale   SubCutaneous every 6 hours  LORazepam   Injectable 2 milliGRAM(s) IV Push every 4 hours PRN  morphine  - Injectable 2 milliGRAM(s) IV Push every 4 hours PRN  pantoprazole  Injectable 40 milliGRAM(s) IV Push two times a day  potassium phosphate IVPB 15 milliMole(s) IV Intermittent once  simvastatin 20 milliGRAM(s) Oral at bedtime    acetaminophen    Suspension .. 650 milliGRAM(s) Oral every 6 hours PRN  artificial  tears Solution 1 Drop(s) Both EYES two times a day  cefepime   IVPB      cefepime   IVPB 2000 milliGRAM(s) IV Intermittent every 8 hours  dextrose 40% Gel 15 Gram(s) Oral once PRN  dextrose 5%. 1000 milliLiter(s) IV Continuous <Continuous>  dextrose 50% Injectable 12.5 Gram(s) IV Push once  dextrose 50% Injectable 25 Gram(s) IV Push once  dextrose 50% Injectable 25 Gram(s) IV Push once  fentaNYL   Infusion. 0.5 MICROgram(s)/kG/Hr IV Continuous <Continuous>  ferrous    sulfate Liquid 300 milliGRAM(s) Oral daily  glucagon  Injectable 1 milliGRAM(s) IntraMuscular once PRN  insulin glargine Injectable (LANTUS) 10 Unit(s) SubCutaneous at bedtime  insulin lispro (HumaLOG) corrective regimen sliding scale   SubCutaneous every 6 hours  LORazepam   Injectable 2 milliGRAM(s) IV Push every 4 hours PRN  morphine  - Injectable 2 milliGRAM(s) IV Push every 4 hours PRN  norepinephrine Infusion 0.5 MICROgram(s)/kG/Min IV Continuous <Continuous>  pantoprazole  Injectable 40 milliGRAM(s) IV Push two times a day  potassium phosphate IVPB 15 milliMole(s) IV Intermittent once  simvastatin 20 milliGRAM(s) Oral at bedtime  vancomycin  IVPB 1000 milliGRAM(s) IV Intermittent every 12 hours    Drug Dosing Weight    Weight (kg): 66.6 (08 Sep 2018 11:22)      SANCHEZ: [ x] YES [ ] NO    DATE INSERTED:  REMOVE:  [ ] YES [ ] NO  EXPLAIN:  Central Line: Yes-> Right IJ 18      ICU Vital Signs Last 24 Hrs  T(C): 37.8 (10 Sep 2018 07:30), Max: 38.6 (09 Sep 2018 15:30)  T(F): 100.1 (10 Sep 2018 07:30), Max: 101.4 (09 Sep 2018 15:30)  HR: 105 (10 Sep 2018 08:00) (87 - 138)  BP: 107/60 (10 Sep 2018 08:00) (88/52 - 169/92)  BP(mean): 70 (10 Sep 2018 08:00) (60 - 110)  ABP: --  ABP(mean): --  RR: 12 (10 Sep 2018 08:00) (12 - 21)  SpO2: 100% (10 Sep 2018 08:00) (93% - 100%)      ABG - ( 10 Sep 2018 04:26 )  pH, Arterial: 7.35  pH, Blood: x     /  pCO2: 36    /  pO2: 119   / HCO3: 20    / Base Excess: -5.0  /  SaO2: 99                     @ 07:01  -  09-10 @ 07:00  --------------------------------------------------------  IN: 5.6 mL / OUT: 2685 mL / NET: -659.4 mL        Mode: AC/ CMV (Assist Control/ Continuous Mandatory Ventilation)  RR (machine): 12  TV (machine): 450  FiO2: 40  PEEP: 5  ITime: 1  MAP: 11  PIP: 31      PHYSICAL EXAM:    GENERAL: No signs of distress, comfortable  HEAD: Atraumatic, Normocephalic  EYES: EOMI, PERRLA  ENMT: No erythema, exudates, or enlargement, Moist mucous membranes  NECK: Supple, normal appearance, No JVD; right Ij in place   CHEST/LUNG: No chest deformity, fair bilateral air entry;  HEART: Regular rate and rhythm; No murmurs, rubs, or gallops;   ABDOMEN: Soft, Nontender, Nondistended; Bowel sounds present  EXTREMITIES:  + Peripheral Pulses, No clubbing, cyanosis, or edema  NERVOUS SYSTEM: sedated, intubated.   LYMPH: No lymphadenopathy noted  SKIN: No rashes or lesions; good turgor, warm, dry    LABS:  CBC Full  -  ( 10 Sep 2018 06:13 )  WBC Count : 15.0 K/uL  Hemoglobin : 11.6 g/dL  Hematocrit : 35.6 %  Platelet Count - Automated : 227 K/uL  Mean Cell Volume : 86.7 fl  Mean Cell Hemoglobin : 28.4 pg  Mean Cell Hemoglobin Concentration : 32.8 gm/dL  Auto Neutrophil # : 11.3 K/uL  Auto Lymphocyte # : 2.5 K/uL  Auto Monocyte # : 0.8 K/uL  Auto Eosinophil # : 0.3 K/uL  Auto Basophil # : 0.1 K/uL  Auto Neutrophil % : 75.0 %  Auto Lymphocyte % : 16.5 %  Auto Monocyte % : 5.6 %  Auto Eosinophil % : 2.1 %  Auto Basophil % : 0.8 %    09-10    140  |  111<H>  |  6<L>  ----------------------------<  142<H>  3.9   |  21<L>  |  0.58    Ca    7.5<L>      10 Sep 2018 06:13  Phos  1.1     09-10  Mg     1.6     09-10    TPro  5.9<L>  /  Alb  2.2<L>  /  TBili  0.4  /  DBili  x   /  AST  14  /  ALT  22  /  AlkPhos  57  09-10    PT/INR - ( 09 Sep 2018 00:36 )   PT: 13.8 sec;   INR: 1.26 ratio         PTT - ( 08 Sep 2018 08:27 )  PTT:34.2 sec  Urinalysis Basic - ( 08 Sep 2018 15:09 )    Color: Yellow / Appearance: Clear / S.010 / pH: x  Gluc: x / Ketone: Trace  / Bili: Negative / Urobili: Negative   Blood: x / Protein: 100 / Nitrite: Negative   Leuk Esterase: Trace / RBC: 0-2 /HPF / WBC 0-2 /HPF   Sq Epi: x / Non Sq Epi: Few /HPF / Bacteria: Negative /HPF          RADIOLOGY & ADDITIONAL STUDIES REVIEWED:  ***      CRITICAL CARE TIME SPENT: 35 minutes INTERVAL HPI/OVERNIGHT EVENTS: *** No events. will wean patient off sedation to assess mental status.     PRESSORS: [ ] YES [x ] NO      Antimicrobial:  cefepime   IVPB      cefepime   IVPB 2000 milliGRAM(s) IV Intermittent every 8 hours  vancomycin  IVPB 1000 milliGRAM(s) IV Intermittent every 12 hours    Cardiovascular:  norepinephrine Infusion 0.5 MICROgram(s)/kG/Min IV Continuous <Continuous>    Pulmonary:    Hematalogic:    Other:  acetaminophen    Suspension .. 650 milliGRAM(s) Oral every 6 hours PRN  artificial  tears Solution 1 Drop(s) Both EYES two times a day  dextrose 40% Gel 15 Gram(s) Oral once PRN  dextrose 5%. 1000 milliLiter(s) IV Continuous <Continuous>  dextrose 50% Injectable 12.5 Gram(s) IV Push once  dextrose 50% Injectable 25 Gram(s) IV Push once  dextrose 50% Injectable 25 Gram(s) IV Push once  fentaNYL   Infusion. 0.5 MICROgram(s)/kG/Hr IV Continuous <Continuous>  ferrous    sulfate Liquid 300 milliGRAM(s) Oral daily  glucagon  Injectable 1 milliGRAM(s) IntraMuscular once PRN  insulin glargine Injectable (LANTUS) 10 Unit(s) SubCutaneous at bedtime  insulin lispro (HumaLOG) corrective regimen sliding scale   SubCutaneous every 6 hours  LORazepam   Injectable 2 milliGRAM(s) IV Push every 4 hours PRN  morphine  - Injectable 2 milliGRAM(s) IV Push every 4 hours PRN  pantoprazole  Injectable 40 milliGRAM(s) IV Push two times a day  potassium phosphate IVPB 15 milliMole(s) IV Intermittent once  simvastatin 20 milliGRAM(s) Oral at bedtime    acetaminophen    Suspension .. 650 milliGRAM(s) Oral every 6 hours PRN  artificial  tears Solution 1 Drop(s) Both EYES two times a day  cefepime   IVPB      cefepime   IVPB 2000 milliGRAM(s) IV Intermittent every 8 hours  dextrose 40% Gel 15 Gram(s) Oral once PRN  dextrose 5%. 1000 milliLiter(s) IV Continuous <Continuous>  dextrose 50% Injectable 12.5 Gram(s) IV Push once  dextrose 50% Injectable 25 Gram(s) IV Push once  dextrose 50% Injectable 25 Gram(s) IV Push once  fentaNYL   Infusion. 0.5 MICROgram(s)/kG/Hr IV Continuous <Continuous>  ferrous    sulfate Liquid 300 milliGRAM(s) Oral daily  glucagon  Injectable 1 milliGRAM(s) IntraMuscular once PRN  insulin glargine Injectable (LANTUS) 10 Unit(s) SubCutaneous at bedtime  insulin lispro (HumaLOG) corrective regimen sliding scale   SubCutaneous every 6 hours  LORazepam   Injectable 2 milliGRAM(s) IV Push every 4 hours PRN  morphine  - Injectable 2 milliGRAM(s) IV Push every 4 hours PRN  norepinephrine Infusion 0.5 MICROgram(s)/kG/Min IV Continuous <Continuous>  pantoprazole  Injectable 40 milliGRAM(s) IV Push two times a day  potassium phosphate IVPB 15 milliMole(s) IV Intermittent once  simvastatin 20 milliGRAM(s) Oral at bedtime  vancomycin  IVPB 1000 milliGRAM(s) IV Intermittent every 12 hours    Drug Dosing Weight    Weight (kg): 66.6 (08 Sep 2018 11:22)      SANCHEZ: [ x] YES [ ] NO    DATE INSERTED:  REMOVE:  [ ] YES [ ] NO  EXPLAIN:  Central Line: Yes-> Right IJ 18      ICU Vital Signs Last 24 Hrs  T(C): 37.8 (10 Sep 2018 07:30), Max: 38.6 (09 Sep 2018 15:30)  T(F): 100.1 (10 Sep 2018 07:30), Max: 101.4 (09 Sep 2018 15:30)  HR: 105 (10 Sep 2018 08:00) (87 - 138)  BP: 107/60 (10 Sep 2018 08:00) (88/52 - 169/92)  BP(mean): 70 (10 Sep 2018 08:00) (60 - 110)  ABP: --  ABP(mean): --  RR: 12 (10 Sep 2018 08:00) (12 - 21)  SpO2: 100% (10 Sep 2018 08:00) (93% - 100%)      ABG - ( 10 Sep 2018 04:26 )  pH, Arterial: 7.35  pH, Blood: x     /  pCO2: 36    /  pO2: 119   / HCO3: 20    / Base Excess: -5.0  /  SaO2: 99                     @ 07:01  -  09-10 @ 07:00  --------------------------------------------------------  IN: 2025.6 mL / OUT: 2685 mL / NET: -659.4 mL        Mode: AC/ CMV (Assist Control/ Continuous Mandatory Ventilation)  RR (machine): 12  TV (machine): 450  FiO2: 40  PEEP: 5  ITime: 1  MAP: 11  PIP: 31      PHYSICAL EXAM:    GENERAL: No signs of distress, comfortable  HEAD: Atraumatic, Normocephalic  EYES: EOMI, PERRLA  ENMT: No erythema, exudates, or enlargement, Moist mucous membranes  NECK: Supple, normal appearance, No JVD; right Ij in place   CHEST/LUNG: No chest deformity, fair bilateral air entry;  HEART: Regular rate and rhythm; No murmurs, rubs, or gallops;   ABDOMEN: Soft, Nontender, Nondistended; Bowel sounds present  EXTREMITIES:  + Peripheral Pulses, No clubbing, cyanosis, or edema  NERVOUS SYSTEM: sedated, intubated. response to verbal stimuli.   LYMPH: No lymphadenopathy noted  SKIN: No rashes or lesions; good turgor, warm, dry    LABS:  CBC Full  -  ( 10 Sep 2018 06:13 )  WBC Count : 15.0 K/uL  Hemoglobin : 11.6 g/dL  Hematocrit : 35.6 %  Platelet Count - Automated : 227 K/uL  Mean Cell Volume : 86.7 fl  Mean Cell Hemoglobin : 28.4 pg  Mean Cell Hemoglobin Concentration : 32.8 gm/dL  Auto Neutrophil # : 11.3 K/uL  Auto Lymphocyte # : 2.5 K/uL  Auto Monocyte # : 0.8 K/uL  Auto Eosinophil # : 0.3 K/uL  Auto Basophil # : 0.1 K/uL  Auto Neutrophil % : 75.0 %  Auto Lymphocyte % : 16.5 %  Auto Monocyte % : 5.6 %  Auto Eosinophil % : 2.1 %  Auto Basophil % : 0.8 %    09-10    140  |  111<H>  |  6<L>  ----------------------------<  142<H>  3.9   |  21<L>  |  0.58    Ca    7.5<L>      10 Sep 2018 06:13  Phos  1.1     09-10  Mg     1.6     09-10    TPro  5.9<L>  /  Alb  2.2<L>  /  TBili  0.4  /  DBili  x   /  AST  14  /  ALT  22  /  AlkPhos  57  09-10    PT/INR - ( 09 Sep 2018 00:36 )   PT: 13.8 sec;   INR: 1.26 ratio         PTT - ( 08 Sep 2018 08:27 )  PTT:34.2 sec  Urinalysis Basic - ( 08 Sep 2018 15:09 )    Color: Yellow / Appearance: Clear / S.010 / pH: x  Gluc: x / Ketone: Trace  / Bili: Negative / Urobili: Negative   Blood: x / Protein: 100 / Nitrite: Negative   Leuk Esterase: Trace / RBC: 0-2 /HPF / WBC 0-2 /HPF   Sq Epi: x / Non Sq Epi: Few /HPF / Bacteria: Negative /HPF          RADIOLOGY & ADDITIONAL STUDIES REVIEWED:  ***      CRITICAL CARE TIME SPENT: 35 minutes

## 2018-09-10 NOTE — CHART NOTE - NSCHARTNOTEFT_GEN_A_CORE
Spoke with patient's family at bed side with help of mandarin . Explain to them regarding current situation. informed them about patient having bilateral pneumonia and being in septic shock requiring pressors. Also explained that patient's mental status is important for extubation. Patient failed SBT yesterday. Informed that we are trying to taper off pressers and sedation. Patient's  Mr Fredis Candelario ([phone]) ( [phone]) confirmed that patient is AAO X 3 . She went to Rehab after having cervical spine surgery. Patient felt sleepy around 9:00 pm in NH on night of arrival to hospital. Patient's blood glucose at that time was normal as per . Patient's  will be here tomorrow morning for SBT. patient is minimally responsive after discontinuing fentanyl, ativan and morphine. Spoke with patient's family at bed side with help of mandarin . Explain to them regarding current situation. informed them about patient having bilateral pneumonia and being in septic shock requiring pressors. Also explained that patient's mental status is important for extubation. Patient failed SBT yesterday. Informed that we are trying to taper off pressers and sedation. Patient's  Mr Fredis Candelario ([phone]) ( [phone]) confirmed that patient is AAO X 3 at baseline. She went to Rehab after having cervical spine surgery. Patient felt sleepy around 9:00 pm in NH on night of arrival to hospital. Patient's blood glucose at that time was normal as per . Patient's  will be here tomorrow morning for SBT. patient is minimally responsive after discontinuing fentanyl, ativan and morphine.

## 2018-09-10 NOTE — PROGRESS NOTE ADULT - PROBLEM SELECTOR PLAN 1
secondary to PNA  Hypoxic respiratory failure   fever T max 100.5 in last 24 hours.   F/u blood culture   CT abd: B/l lower lobe consolidation   c/w Iv antibiotics

## 2018-09-10 NOTE — PROGRESS NOTE ADULT - SUBJECTIVE AND OBJECTIVE BOX
Interval Events:  pt remains intubated and on pressors    Allergies    No Known Allergies    Intolerances      Endocrine/Metabolic Medications:  dextrose 40% Gel 15 Gram(s) Oral once PRN  dextrose 50% Injectable 12.5 Gram(s) IV Push once  dextrose 50% Injectable 25 Gram(s) IV Push once  dextrose 50% Injectable 25 Gram(s) IV Push once  glucagon  Injectable 1 milliGRAM(s) IntraMuscular once PRN  insulin glargine Injectable (LANTUS) 10 Unit(s) SubCutaneous at bedtime  insulin lispro (HumaLOG) corrective regimen sliding scale   SubCutaneous every 6 hours  simvastatin 20 milliGRAM(s) Oral at bedtime      Vital Signs Last 24 Hrs  T(C): 37.8 (10 Sep 2018 07:30), Max: 38.6 (09 Sep 2018 15:30)  T(F): 100.1 (10 Sep 2018 07:30), Max: 101.4 (09 Sep 2018 15:30)  HR: 111 (10 Sep 2018 09:30) (90 - 138)  BP: 132/69 (10 Sep 2018 09:30) (88/52 - 169/92)  BP(mean): 85 (10 Sep 2018 09:30) (60 - 110)  RR: 12 (10 Sep 2018 09:30) (12 - 21)  SpO2: 100% (10 Sep 2018 09:30) (93% - 100%)      PHYSICAL EXAM  All physical exam findings normal, except those marked:  General:	Alert, active, cooperative, NAD, well hydrated  .		[] Abnormal:  Neck		Normal: supple, no cervical adenopathy, no palpable thyroid  .		[] Abnormal:  Cardiovascular	Normal: regular rate, normal S1, S2, no murmurs  .		[] Abnormal:  Respiratory	Normal: no chest wall deformity, normal respiratory pattern, CTA B/L  .		[] Abnormal:  Abdominal	Normal: soft, ND, NT, bowel sounds present, no masses, no organomegaly  .		[] Abnormal:  		Normal normal genitalia, testes descended, circumcised/uncircumcised  .		Addison stage:			Breast addison:  .		Menstrual history:  .		[] Abnormal:  Extremities	Normal: FROM x4  .		[] Abnormal:  Skin		Normal: intact and not indurated, no rash, no acanthosis nigricans  .		[] Abnormal:  Neurologic	Normal: grossly intact  .		[] Abnormal:    LABS                        11.6   15.0  )-----------( 227      ( 10 Sep 2018 06:13 )             35.6                               140    |  111    |  6                   Calcium: 7.5   / iCa: x      (09-10 @ 06:13)    ----------------------------<  142       Magnesium: 1.6                              3.9     |  21     |  0.58             Phosphorous: 1.1      TPro  5.9    /  Alb  2.2    /  TBili  0.4    /  DBili  x      /  AST  14     /  ALT  22     /  AlkPhos  57     10 Sep 2018 06:13    CAPILLARY BLOOD GLUCOSE      POCT Blood Glucose.: 147 mg/dL (10 Sep 2018 05:28)  POCT Blood Glucose.: 216 mg/dL (09 Sep 2018 23:57)  POCT Blood Glucose.: 166 mg/dL (09 Sep 2018 17:59)  POCT Blood Glucose.: 257 mg/dL (09 Sep 2018 13:45)  POCT Blood Glucose.: 301 mg/dL (09 Sep 2018 12:04)        Assesment/plan  Hypoglycemia. Recommendation: due to over dose of insulin   resolved  cont humalog prn   continue lantus 10 units if fsg >200s  monitor fsg q6 hrs.    Recommendation - 2:  Hemorrhagic shock.  Recommendation: s/p 2 Bolus   2 Prbcs.  monitor h/h     Problem/Recommendation - 3:   Pneumonia.  Recommendation: c/w Iv antibiotics.

## 2018-09-10 NOTE — PROGRESS NOTE ADULT - ASSESSMENT
A 58 yo female who [facility] for evaluation of hypoglycemia and unresponsiveness. As per EMS, patient was thought to have low blood sugars so was treated for that without improvement in mental status. Hence, intubated by ED attending and admitted to ICU for further management. She found to have Leukocytosis, fever and B/L extensive Lower lobe infiltrate. She has started on Ceftriaxone and Doxycyline and The ID consult requested to assist with further evaluation and antibiotic management.    # Septic shock- on pressor  # B/L Lower lobe Pneumonia    Would recommend:    1. Follow up sputum culture and MRSA/MSSA PCR  2. Continue Cefepime  and  Vancomycin until work up is done  3. Monitor and Keep Vancomycin level between 15 to 20  4. Aspiration precaution  5. Management of Vent as per ICU    d/w ICU team    will follow the patient with you

## 2018-09-11 DIAGNOSIS — Z51.5 ENCOUNTER FOR PALLIATIVE CARE: ICD-10-CM

## 2018-09-11 DIAGNOSIS — G93.40 ENCEPHALOPATHY, UNSPECIFIED: ICD-10-CM

## 2018-09-11 DIAGNOSIS — R53.81 OTHER MALAISE: ICD-10-CM

## 2018-09-11 LAB
ANION GAP SERPL CALC-SCNC: 9 MMOL/L — SIGNIFICANT CHANGE UP (ref 5–17)
BUN SERPL-MCNC: 6 MG/DL — LOW (ref 7–18)
CALCIUM SERPL-MCNC: 7.5 MG/DL — LOW (ref 8.4–10.5)
CHLORIDE SERPL-SCNC: 108 MMOL/L — SIGNIFICANT CHANGE UP (ref 96–108)
CK MB BLD-MCNC: <1 % — SIGNIFICANT CHANGE UP (ref 0–3.5)
CK MB CFR SERPL CALC: <1 NG/ML — SIGNIFICANT CHANGE UP (ref 0–3.6)
CK SERPL-CCNC: 102 U/L — SIGNIFICANT CHANGE UP (ref 21–215)
CO2 SERPL-SCNC: 20 MMOL/L — LOW (ref 22–31)
CREAT SERPL-MCNC: 0.56 MG/DL — SIGNIFICANT CHANGE UP (ref 0.5–1.3)
GLUCOSE BLDC GLUCOMTR-MCNC: 164 MG/DL — HIGH (ref 70–99)
GLUCOSE BLDC GLUCOMTR-MCNC: 185 MG/DL — HIGH (ref 70–99)
GLUCOSE BLDC GLUCOMTR-MCNC: 201 MG/DL — HIGH (ref 70–99)
GLUCOSE BLDC GLUCOMTR-MCNC: 222 MG/DL — HIGH (ref 70–99)
GLUCOSE SERPL-MCNC: 202 MG/DL — HIGH (ref 70–99)
HCT VFR BLD CALC: 34 % — LOW (ref 34.5–45)
HGB BLD-MCNC: 11.1 G/DL — LOW (ref 11.5–15.5)
MAGNESIUM SERPL-MCNC: 1.9 MG/DL — SIGNIFICANT CHANGE UP (ref 1.6–2.6)
MCHC RBC-ENTMCNC: 28.3 PG — SIGNIFICANT CHANGE UP (ref 27–34)
MCHC RBC-ENTMCNC: 32.6 GM/DL — SIGNIFICANT CHANGE UP (ref 32–36)
MCV RBC AUTO: 86.9 FL — SIGNIFICANT CHANGE UP (ref 80–100)
PHOSPHATE SERPL-MCNC: 1.9 MG/DL — LOW (ref 2.5–4.5)
PLATELET # BLD AUTO: 214 K/UL — SIGNIFICANT CHANGE UP (ref 150–400)
POTASSIUM SERPL-MCNC: 4 MMOL/L — SIGNIFICANT CHANGE UP (ref 3.5–5.3)
POTASSIUM SERPL-SCNC: 4 MMOL/L — SIGNIFICANT CHANGE UP (ref 3.5–5.3)
RBC # BLD: 3.92 M/UL — SIGNIFICANT CHANGE UP (ref 3.8–5.2)
RBC # FLD: 13 % — SIGNIFICANT CHANGE UP (ref 10.3–14.5)
SODIUM SERPL-SCNC: 137 MMOL/L — SIGNIFICANT CHANGE UP (ref 135–145)
TROPONIN I SERPL-MCNC: 0.21 NG/ML — HIGH (ref 0–0.04)
VANCOMYCIN TROUGH SERPL-MCNC: 11.1 UG/ML — SIGNIFICANT CHANGE UP (ref 10–20)
WBC # BLD: 11.9 K/UL — HIGH (ref 3.8–10.5)
WBC # FLD AUTO: 11.9 K/UL — HIGH (ref 3.8–10.5)

## 2018-09-11 PROCEDURE — 99223 1ST HOSP IP/OBS HIGH 75: CPT

## 2018-09-11 PROCEDURE — 71045 X-RAY EXAM CHEST 1 VIEW: CPT | Mod: 26

## 2018-09-11 PROCEDURE — 99291 CRITICAL CARE FIRST HOUR: CPT

## 2018-09-11 RX ORDER — MAGNESIUM SULFATE 500 MG/ML
1 VIAL (ML) INJECTION ONCE
Qty: 0 | Refills: 0 | Status: COMPLETED | OUTPATIENT
Start: 2018-09-11 | End: 2018-09-11

## 2018-09-11 RX ORDER — ACETAMINOPHEN 500 MG
1000 TABLET ORAL ONCE
Qty: 0 | Refills: 0 | Status: COMPLETED | OUTPATIENT
Start: 2018-09-11 | End: 2018-09-11

## 2018-09-11 RX ORDER — LACTULOSE 10 G/15ML
10 SOLUTION ORAL
Qty: 0 | Refills: 0 | Status: DISCONTINUED | OUTPATIENT
Start: 2018-09-11 | End: 2018-09-20

## 2018-09-11 RX ORDER — FUROSEMIDE 40 MG
40 TABLET ORAL ONCE
Qty: 0 | Refills: 0 | Status: COMPLETED | OUTPATIENT
Start: 2018-09-11 | End: 2018-09-11

## 2018-09-11 RX ORDER — METOPROLOL TARTRATE 50 MG
5 TABLET ORAL ONCE
Qty: 0 | Refills: 0 | Status: COMPLETED | OUTPATIENT
Start: 2018-09-11 | End: 2018-09-11

## 2018-09-11 RX ORDER — POTASSIUM PHOSPHATE, MONOBASIC POTASSIUM PHOSPHATE, DIBASIC 236; 224 MG/ML; MG/ML
15 INJECTION, SOLUTION INTRAVENOUS ONCE
Qty: 0 | Refills: 0 | Status: COMPLETED | OUTPATIENT
Start: 2018-09-11 | End: 2018-09-11

## 2018-09-11 RX ORDER — METOPROLOL TARTRATE 50 MG
12.5 TABLET ORAL
Qty: 0 | Refills: 0 | Status: DISCONTINUED | OUTPATIENT
Start: 2018-09-11 | End: 2018-09-12

## 2018-09-11 RX ADMIN — PANTOPRAZOLE SODIUM 40 MILLIGRAM(S): 20 TABLET, DELAYED RELEASE ORAL at 17:44

## 2018-09-11 RX ADMIN — SIMVASTATIN 20 MILLIGRAM(S): 20 TABLET, FILM COATED ORAL at 21:11

## 2018-09-11 RX ADMIN — Medication 1000 MILLIGRAM(S): at 17:39

## 2018-09-11 RX ADMIN — POTASSIUM PHOSPHATE, MONOBASIC POTASSIUM PHOSPHATE, DIBASIC 62.5 MILLIMOLE(S): 236; 224 INJECTION, SOLUTION INTRAVENOUS at 09:04

## 2018-09-11 RX ADMIN — Medication 40 MILLIGRAM(S): at 10:15

## 2018-09-11 RX ADMIN — PANTOPRAZOLE SODIUM 40 MILLIGRAM(S): 20 TABLET, DELAYED RELEASE ORAL at 06:05

## 2018-09-11 RX ADMIN — Medication 2: at 00:04

## 2018-09-11 RX ADMIN — Medication 4: at 17:43

## 2018-09-11 RX ADMIN — Medication 250 MILLIGRAM(S): at 05:26

## 2018-09-11 RX ADMIN — Medication 1 DROP(S): at 17:41

## 2018-09-11 RX ADMIN — DEXMEDETOMIDINE HYDROCHLORIDE IN 0.9% SODIUM CHLORIDE 3.33 MICROGRAM(S)/KG/HR: 4 INJECTION INTRAVENOUS at 05:57

## 2018-09-11 RX ADMIN — Medication 5 MILLIGRAM(S): at 19:34

## 2018-09-11 RX ADMIN — CEFEPIME 100 MILLIGRAM(S): 1 INJECTION, POWDER, FOR SOLUTION INTRAMUSCULAR; INTRAVENOUS at 14:50

## 2018-09-11 RX ADMIN — Medication 1 DROP(S): at 05:47

## 2018-09-11 RX ADMIN — CEFEPIME 100 MILLIGRAM(S): 1 INJECTION, POWDER, FOR SOLUTION INTRAMUSCULAR; INTRAVENOUS at 21:39

## 2018-09-11 RX ADMIN — CEFEPIME 100 MILLIGRAM(S): 1 INJECTION, POWDER, FOR SOLUTION INTRAMUSCULAR; INTRAVENOUS at 05:41

## 2018-09-11 RX ADMIN — Medication 300 MILLIGRAM(S): at 12:30

## 2018-09-11 RX ADMIN — DEXMEDETOMIDINE HYDROCHLORIDE IN 0.9% SODIUM CHLORIDE 3.33 MICROGRAM(S)/KG/HR: 4 INJECTION INTRAVENOUS at 00:02

## 2018-09-11 RX ADMIN — INSULIN GLARGINE 10 UNIT(S): 100 INJECTION, SOLUTION SUBCUTANEOUS at 21:40

## 2018-09-11 RX ADMIN — Medication 400 MILLIGRAM(S): at 16:00

## 2018-09-11 RX ADMIN — LACTULOSE 10 GRAM(S): 10 SOLUTION ORAL at 12:30

## 2018-09-11 RX ADMIN — Medication 2: at 07:02

## 2018-09-11 RX ADMIN — Medication 4: at 12:31

## 2018-09-11 RX ADMIN — Medication 100 GRAM(S): at 08:01

## 2018-09-11 NOTE — CONSULT NOTE ADULT - SUBJECTIVE AND OBJECTIVE BOX
HPI:  56 y/o female from [facility] rehab with PMH of HTN, DM and ?cardiac disease (family not sure about exact diagnosis) and PSH of recent C spine surgery (1 month back at [place] for ?nerve compression) sent to ED for unresponsiveness. As per EMS, patient was thought to have low blood sugars so was treated for that without improvement in mental status. Fs in . CT head was unremarkable. CXR clear. Utox only positive for benzodiazepine ([patient is on Valium 5 mg q12). Patient was intubated by ED attending and admitted to ICU for further management.     Per rehab papers, patient was noted to have blood sugars of 49 last night at 9 pm, was given 2 mg of glucagon IM. AM fs at rehab was noted to be 73 and patient was minimally responsive so EMS was called. Spoke to Dr. Welch who takes care of her at rehab. Staff at rehab found patient's home medications at bedside, seems patient took her own Insulin on top of the Insulin given bu rehab staff , leading to hypoglycemia and unresponsiveness. Patient was last seen normal last night before dinner. (08 Sep 2018 11:48)    Patient was intubated in ED and sent to ICU - extubated this AM. Full code on file.     PAST MEDICAL & SURGICAL HISTORY:  Cardiac disease  DM (diabetes mellitus)  HTN (hypertension)  H/O cervical spine surgery      SOCIAL HISTORY:    Admitted from:  [facility]  Preferred Language: Mandarin    Surrogate/HCP/Guardian:  Herminio Mcneal (): [phone]    FAMILY HISTORY:  Family history unknown    Baseline ADLs (prior to admission):    No Known Allergies    Present Symptoms:          Review of Systems: Unable to obtain due to poor mentation    MEDICATIONS  (STANDING):  artificial  tears Solution 1 Drop(s) Both EYES two times a day  cefepime   IVPB      cefepime   IVPB 2000 milliGRAM(s) IV Intermittent every 8 hours  dexmedetomidine Infusion 0.2 MICROgram(s)/kG/Hr (3.33 mL/Hr) IV Continuous <Continuous>  dextrose 50% Injectable 25 Gram(s) IV Push once  ferrous    sulfate Liquid 300 milliGRAM(s) Oral daily  insulin glargine Injectable (LANTUS) 10 Unit(s) SubCutaneous at bedtime  insulin lispro (HumaLOG) corrective regimen sliding scale   SubCutaneous every 6 hours  lactulose Syrup 10 Gram(s) Oral two times a day  pantoprazole  Injectable 40 milliGRAM(s) IV Push two times a day  simvastatin 20 milliGRAM(s) Oral at bedtime  vancomycin  IVPB 1000 milliGRAM(s) IV Intermittent every 12 hours    MEDICATIONS  (PRN):  acetaminophen    Suspension .. 650 milliGRAM(s) Oral every 6 hours PRN Temp greater or equal to 38C (100.4F), Mild Pain (1 - 3)      PHYSICAL EXAM:    Vital Signs Last 24 Hrs  T(C): 37.4 (11 Sep 2018 09:30), Max: 37.4 (11 Sep 2018 09:30)  T(F): 99.4 (11 Sep 2018 09:30), Max: 99.4 (11 Sep 2018 09:30)  HR: 112 (11 Sep 2018 12:30) (74 - 116)  BP: 143/87 (11 Sep 2018 12:30) (89/51 - 166/99)  BP(mean): 100 (11 Sep 2018 12:30) (61 - 115)  RR: 14 (11 Sep 2018 12:30) (9 - 18)  SpO2: 100% (11 Sep 2018 12:30) (92% - 100%)    General: pt sedated, eyes open, responsive   Karnofsky Performance Score/Palliative Performance Status Version2:    30 %    HEENT: normal     Lungs: comfortable, on aerosol mask  CV:  tachycardia  GI:    incontinent  :   jensen  Musculoskeletal: bedbound, dependent on ADLs  Neuro: weaning off sedation, awake  Oral intake ability: unable/only mouth care  Diet: NPO    LABS:                        11.1   11.9  )-----------( 214      ( 11 Sep 2018 06:28 )             34.0     09-11    137  |  108  |  6<L>  ----------------------------<  202<H>  4.0   |  20<L>  |  0.56    Ca    7.5<L>      11 Sep 2018 06:28  Phos  1.9     09-11  Mg     1.9     09-11    TPro  5.9<L>  /  Alb  2.2<L>  /  TBili  0.4  /  DBili  x   /  AST  14  /  ALT  22  /  AlkPhos  57  09-10        RADIOLOGY & ADDITIONAL STUDIES:    ADVANCE DIRECTIVES:   Advanced Care Planning discussion total time spent: HPI:  58 y/o female from [facility] rehab with PMH of HTN, DM and ?cardiac disease (family not sure about exact diagnosis) and PSH of recent C spine surgery (1 month back at [place] for ?nerve compression) sent to ED for unresponsiveness. As per EMS, patient was thought to have low blood sugars so was treated for that without improvement in mental status. Fs in . CT head was unremarkable. CXR clear. Utox only positive for benzodiazepine ([patient is on Valium 5 mg q12). Patient was intubated by ED attending and admitted to ICU for further management.     Per rehab papers, patient was noted to have blood sugars of 49 last night at 9 pm, was given 2 mg of glucagon IM. AM fs at rehab was noted to be 73 and patient was minimally responsive so EMS was called. Spoke to Dr. Welch who takes care of her at rehab. Staff at rehab found patient's home medications at bedside, seems patient took her own Insulin on top of the Insulin given bu rehab staff , leading to hypoglycemia and unresponsiveness. Patient was last seen normal last night before dinner. (08 Sep 2018 11:48)    Patient was intubated in ED and sent to ICU - now off pressors, extubated this AM. Full code on file.     PAST MEDICAL & SURGICAL HISTORY:  Cardiac disease  DM (diabetes mellitus)  HTN (hypertension)  H/O cervical spine surgery    SOCIAL HISTORY:    Admitted from:  [facility]  Preferred Language: Mandarin    Surrogate/HCP/Guardian:  Herminio Mcneal (): [phone]    FAMILY HISTORY:  Family history unknown    Baseline ADLs (prior to admission): independent    No Known Allergies    Present Symptoms:          Review of Systems: Unable to obtain due to poor mentation    MEDICATIONS  (STANDING):  artificial  tears Solution 1 Drop(s) Both EYES two times a day  cefepime   IVPB      cefepime   IVPB 2000 milliGRAM(s) IV Intermittent every 8 hours  dexmedetomidine Infusion 0.2 MICROgram(s)/kG/Hr (3.33 mL/Hr) IV Continuous <Continuous>  dextrose 50% Injectable 25 Gram(s) IV Push once  ferrous    sulfate Liquid 300 milliGRAM(s) Oral daily  insulin glargine Injectable (LANTUS) 10 Unit(s) SubCutaneous at bedtime  insulin lispro (HumaLOG) corrective regimen sliding scale   SubCutaneous every 6 hours  lactulose Syrup 10 Gram(s) Oral two times a day  pantoprazole  Injectable 40 milliGRAM(s) IV Push two times a day  simvastatin 20 milliGRAM(s) Oral at bedtime  vancomycin  IVPB 1000 milliGRAM(s) IV Intermittent every 12 hours    MEDICATIONS  (PRN):  acetaminophen    Suspension .. 650 milliGRAM(s) Oral every 6 hours PRN Temp greater or equal to 38C (100.4F), Mild Pain (1 - 3)      PHYSICAL EXAM:    Vital Signs Last 24 Hrs  T(C): 37.4 (11 Sep 2018 09:30), Max: 37.4 (11 Sep 2018 09:30)  T(F): 99.4 (11 Sep 2018 09:30), Max: 99.4 (11 Sep 2018 09:30)  HR: 112 (11 Sep 2018 12:30) (74 - 116)  BP: 143/87 (11 Sep 2018 12:30) (89/51 - 166/99)  BP(mean): 100 (11 Sep 2018 12:30) (61 - 115)  RR: 14 (11 Sep 2018 12:30) (9 - 18)  SpO2: 100% (11 Sep 2018 12:30) (92% - 100%)    General: pt sedated, eyes open, minimally responsive   Karnofsky Performance Score/Palliative Performance Status Version2:    30 %    HEENT: normal     Lungs: comfortable, on aerosol mask  CV:  tachycardia  GI:    incontinent  :   jensen  Musculoskeletal: bedbound, dependent on ADLs  Neuro: weaning off sedation, awake  Oral intake ability: unable/only mouth care  Diet: NPO    LABS:                        11.1   11.9  )-----------( 214      ( 11 Sep 2018 06:28 )             34.0     09-11    137  |  108  |  6<L>  ----------------------------<  202<H>  4.0   |  20<L>  |  0.56    Ca    7.5<L>      11 Sep 2018 06:28  Phos  1.9     09-11  Mg     1.9     09-11    TPro  5.9<L>  /  Alb  2.2<L>  /  TBili  0.4  /  DBili  x   /  AST  14  /  ALT  22  /  AlkPhos  57  09-10        RADIOLOGY & ADDITIONAL STUDIES:  < from: Xray Chest 1 View- PORTABLE-Urgent (09.11.18 @ 13:53) >  EXAM:  XR CHEST PORTABLE URGENT 1V                            PROCEDURE DATE:  09/11/2018          INTERPRETATION:  CLINICAL STATEMENT: Follow-up chest pain.    TECHNIQUE: AP view of the chest.    COMPARISON: 9/10/2018    FINDINGS/  IMPRESSION:  Right central line, feeding tube again noted. No pneumothorax.    Increased interstitial lung markings without significant change. New   opacity right lung. There are aeration left lung.    Probable small bilateral pleural effusions.    Heart size cannot be accurately assessed in this projection.      < end of copied text >      ADVANCE DIRECTIVES: full code

## 2018-09-11 NOTE — PROGRESS NOTE ADULT - ASSESSMENT
58 y/o female from [facility] rehab with PMH of HTN, DM and ?cardiac disease (family not sure about exact diagnosis) and PSH of recent C spine surgery (1 month back at [place] for ?nerve compression) sent to ED for unresponsiveness. Likely secondary to hypoglycemia. Was intubated in ED and admitted to ICU on 9/9/10

## 2018-09-11 NOTE — CONSULT NOTE ADULT - SUBJECTIVE AND OBJECTIVE BOX
Patient is a 57y old  Female who presents with a chief complaint of unresponsiveness (11 Sep 2018 09:44)      HPI:  58 y/o female from [facility] rehab with PMH of HTN, DM and ?cardiac disease (family not sure about exact diagnosis) and PSH of recent C spine surgery (1 month back at [place] for ?nerve compression) sent to ED for unresponsiveness. As per EMS, patient was thought to have low blood sugars so was treated for that without improvement in mental status. Fs in . CT head was unremarkable. CXR clear. Utox only positive for benzodiazepine ([patient is on Valium 5 mg q12). Patient was intubated by ED attending and admitted to ICU for further management.     Per rehab papers, patient was noted to have blood sugars of 49 last night at 9 pm, was given 2 mg of glucagon IM. AM fs at rehab was noted to be 73 and patient was minimally responsive so EMS was called. Spoke to Dr. Welch who takes care of her at rehab. Staff at rehab found patient's home medications at bedside, seems patient took her own Insulin on top of the Insulin given bu rehab staff , leading to hypoglycemia and unresponsiveness. Patient was last seen normal last night before dinner. (08 Sep 2018 11:48)         Neurological Review of Systems:  No difficulty with language.  No vision loss or double vision.  No dizziness, vertigo or new hearing loss.  No difficulty with speech or swallowing.  No focal weakness.  No focal sensory changes.  No numbness or tingling in the bilateral lower extremities.  No difficulty with balance.  No difficulty with ambulation.        MEDICATIONS  (STANDING):  artificial  tears Solution 1 Drop(s) Both EYES two times a day  cefepime   IVPB      cefepime   IVPB 2000 milliGRAM(s) IV Intermittent every 8 hours  dexmedetomidine Infusion 0.2 MICROgram(s)/kG/Hr (3.33 mL/Hr) IV Continuous <Continuous>  dextrose 50% Injectable 25 Gram(s) IV Push once  ferrous    sulfate Liquid 300 milliGRAM(s) Oral daily  insulin glargine Injectable (LANTUS) 10 Unit(s) SubCutaneous at bedtime  insulin lispro (HumaLOG) corrective regimen sliding scale   SubCutaneous every 6 hours  lactulose Syrup 10 Gram(s) Oral two times a day  pantoprazole  Injectable 40 milliGRAM(s) IV Push two times a day  simvastatin 20 milliGRAM(s) Oral at bedtime  vancomycin  IVPB 1000 milliGRAM(s) IV Intermittent every 12 hours    MEDICATIONS  (PRN):  acetaminophen    Suspension .. 650 milliGRAM(s) Oral every 6 hours PRN Temp greater or equal to 38C (100.4F), Mild Pain (1 - 3)    Allergies    No Known Allergies    Intolerances      PAST MEDICAL & SURGICAL HISTORY:  Cardiac disease  DM (diabetes mellitus)  HTN (hypertension)  H/O cervical spine surgery    FAMILY HISTORY:  Family history unknown    SOCIAL HISTORY: non smoker/ former smoker/ active smoker    Review of Systems:  Constitutional: No generalized weakness. No fevers or chills.                    Eyes, Ears, Mouth, Throat: No vision loss   Respiratory: No shortness of breath or cough.                                Cardiovascular: No chest pain or palpitations  Gastrointestinal: No nausea or vomiting.                                         Genitourinary: No urinary incontinence or burning on urination.  Musculoskeletal: No joint pain.                                                           Dermatologic: No rash.  Neurological: as per HPI                                                                      Psychiatric: No behavioral problems.  Endocrine: No known hypoglycemia.               Hematologic/Lymphatic: No easy bleeding.    O:  Vital Signs Last 24 Hrs  T(C): 37.4 (11 Sep 2018 09:30), Max: 38.3 (10 Sep 2018 11:30)  T(F): 99.4 (11 Sep 2018 09:30), Max: 101 (10 Sep 2018 11:30)  HR: 110 (11 Sep 2018 10:30) (74 - 115)  BP: 135/79 (11 Sep 2018 10:30) (89/51 - 166/99)  BP(mean): 93 (11 Sep 2018 10:30) (61 - 115)  RR: 18 (11 Sep 2018 10:30) (9 - 18)  SpO2: 97% (11 Sep 2018 10:30) (92% - 100%)    General Exam:   General appearance: No acute distress                 Cardiovascular: Pedal dorsalis pulses intact bilaterally    Mental Status: Orientated to self, date and place.  Attention intact.  No dysarthria, aphasia or neglect.  Knowledge intact.  Registration intact.  Short and long term memory grossly intact.      Cranial Nerves: CN I - not tested.  PERRL, EOMI, VFF, no nystagmus or diplopia.  No APD.  Fundi not visualized.  CN V1-3 intact to light touch and pinprick.  No facial asymmetry.  Hearing intact to finger rub bilaterally.  Tongue, uvula and palate midline.  Sternocleidomastoid and Trapezius intact bilaterally.    Motor:   Tone: normal.                  Strength intact throughout  No pronator drift bilaterally                      No dysmetria on finger-nose-finger or heel-shin-heel  No truncal ataxia.  No resting, postural or action tremor.  No myoclonus.    Sensation: intact to light touch, pinprick, vibration and proprioception    Deep Tendon Reflexes: 1+ bilateral biceps, triceps, brachioradialis, knee and ankle  Toes flexor bilaterally    Gait: normal and stable.  Rhomberg -austen.    Other:     LABS:                        11.1   11.9  )-----------( 214      ( 11 Sep 2018 06:28 )             34.0     09-11    137  |  108  |  6<L>  ----------------------------<  202<H>  4.0   |  20<L>  |  0.56    Ca    7.5<L>      11 Sep 2018 06:28  Phos  1.9     09-11  Mg     1.9     09-11    TPro  5.9<L>  /  Alb  2.2<L>  /  TBili  0.4  /  DBili  x   /  AST  14  /  ALT  22  /  AlkPhos  57  09-10            RADIOLOGY & ADDITIONAL STUDIES:    EKG: < from: 12 Lead ECG (09.08.18 @ 08:05) >  Diagnosis Line Sinus tachycardia  T wave abnormality, consider lateral ischemia  Abnormal ECG    < end of copied text >    < from: CT Head No Cont (09.08.18 @ 07:55) >  IMPRESSION:     Limited study demonstrates no obvious abnormality as described above.    < end of copied text > Patient is a 57y old  Female who presents with a chief complaint of unresponsiveness (11 Sep 2018 09:44)      HPI:  58 y/o female from [facility] rehab with PMH of HTN, DM and ?cardiac disease (family not sure about exact diagnosis) and PSH of recent C spine surgery (1 month back at [place] for ?nerve compression) sent to ED for unresponsiveness. As per EMS, patient was thought to have low blood sugars so was treated for that without improvement in mental status. Fs in . CT head was unremarkable. CXR clear. Utox only positive for benzodiazepine ([patient is on Valium 5 mg q12). Patient was intubated by ED attending and admitted to ICU for further management.     Per rehab papers, patient was noted to have blood sugars of 49 last night at 9 pm, was given 2 mg of glucagon IM. AM fs at rehab was noted to be 73 and patient was minimally responsive so EMS was called. Spoke to Dr. Welch who takes care of her at rehab. Staff at rehab found patient's home medications at bedside, seems patient took her own Insulin on top of the Insulin given bu rehab staff , leading to hypoglycemia and unresponsiveness. Patient was last seen normal last night before dinner. (08 Sep 2018 11:48)    Since extubation the mental status has been improving, the patient has been also off sedation completely since 9/10/18.        MEDICATIONS  (STANDING):  artificial  tears Solution 1 Drop(s) Both EYES two times a day  cefepime   IVPB      cefepime   IVPB 2000 milliGRAM(s) IV Intermittent every 8 hours  dexmedetomidine Infusion 0.2 MICROgram(s)/kG/Hr (3.33 mL/Hr) IV Continuous <Continuous>  dextrose 50% Injectable 25 Gram(s) IV Push once  ferrous    sulfate Liquid 300 milliGRAM(s) Oral daily  insulin glargine Injectable (LANTUS) 10 Unit(s) SubCutaneous at bedtime  insulin lispro (HumaLOG) corrective regimen sliding scale   SubCutaneous every 6 hours  lactulose Syrup 10 Gram(s) Oral two times a day  pantoprazole  Injectable 40 milliGRAM(s) IV Push two times a day  simvastatin 20 milliGRAM(s) Oral at bedtime  vancomycin  IVPB 1000 milliGRAM(s) IV Intermittent every 12 hours    MEDICATIONS  (PRN):  acetaminophen    Suspension .. 650 milliGRAM(s) Oral every 6 hours PRN Temp greater or equal to 38C (100.4F), Mild Pain (1 - 3)    Allergies    No Known Allergies    Intolerances      PAST MEDICAL & SURGICAL HISTORY:  Cardiac disease  DM (diabetes mellitus)  HTN (hypertension)  H/O cervical spine surgery    FAMILY HISTORY:  Family history unknown    SOCIAL HISTORY: non smoker    Review of Systems:  Constitutional: +fever.                    Eyes, Ears, Mouth, Throat: unknown  Respiratory: No cough.                                Cardiovascular: unknown  Gastrointestinal: unknown                                   Genitourinary: unknown if has burning on urination.  Musculoskeletal: unknown                                                         Dermatologic: unknown  Neurological: as per HPI                                                                      Psychiatric: No behavioral problems.  Endocrine: + known hypoglycemia.               Hematologic/Lymphatic: unknown    O:  Vital Signs Last 24 Hrs  T(C): 37.4 (11 Sep 2018 09:30), Max: 38.3 (10 Sep 2018 11:30)  T(F): 99.4 (11 Sep 2018 09:30), Max: 101 (10 Sep 2018 11:30)  HR: 110 (11 Sep 2018 10:30) (74 - 115)  BP: 135/79 (11 Sep 2018 10:30) (89/51 - 166/99)  BP(mean): 93 (11 Sep 2018 10:30) (61 - 115)  RR: 18 (11 Sep 2018 10:30) (9 - 18)  SpO2: 97% (11 Sep 2018 10:30) (92% - 100%)    General Exam:   General appearance: No acute distress                 Cardiovascular: Pedal dorsalis pulses intact bilaterally    Mental Status: Eyes open, does not maintain attention.  Unable to assess for orientation.  Does not speak or follow requests, moans words.  No gross neglect.  Unable to assess Knowledge, Registration and memory.      Cranial Nerves: CN I - not tested.  PERRL, EOMI grossly, blinks to threat bl temporal areas.  No APD.  Fundi not visualized.  CN V1-3: unable to asess.  No facial asymmetry.  Hearing intact to speech bilaterally.  Tongue, uvula and palate midline.  Sternocleidomastoid and Trapezius intact bilaterally.    Motor:   Tone: normal.                  Strength: moves bl arms and legs equally                   Unable to assess for dysmetria on finger-nose-finger or heel-shin-heel    Sensation: intact to deep pain in all 4 ext    Deep Tendon Reflexes: 1+ bilateral biceps, triceps, brachioradialis, knee  Toes flexor bilaterally    Gait: deferred    Other:     LABS:                        11.1   11.9  )-----------( 214      ( 11 Sep 2018 06:28 )             34.0     09-11    137  |  108  |  6<L>  ----------------------------<  202<H>  4.0   |  20<L>  |  0.56    Ca    7.5<L>      11 Sep 2018 06:28  Phos  1.9     09-11  Mg     1.9     09-11    TPro  5.9<L>  /  Alb  2.2<L>  /  TBili  0.4  /  DBili  x   /  AST  14  /  ALT  22  /  AlkPhos  57  09-10        RADIOLOGY & ADDITIONAL STUDIES:    EKG: < from: 12 Lead ECG (09.08.18 @ 08:05) >  Diagnosis Line Sinus tachycardia  T wave abnormality, consider lateral ischemia  Abnormal ECG    < end of copied text >    < from: CT Head No Cont (09.08.18 @ 07:55) > (images reviwed)  IMPRESSION:     Limited study demonstrates no obvious abnormality as described above.    < end of copied text >

## 2018-09-11 NOTE — CONSULT NOTE ADULT - PROBLEM SELECTOR RECOMMENDATION 9
due to over dose of insulin   resolved  cont humalog prn   consider lantus 10 units if fsg >200s  monitor fsg q4 hrs
2/2 PNA. Patient extubated this morning, now on aerosol mask. Continues IV abx. Full code on file.

## 2018-09-11 NOTE — PROGRESS NOTE ADULT - SUBJECTIVE AND OBJECTIVE BOX
INTERVAL HPI/OVERNIGHT EVENTS: *** no events. patient was agitated over night.     PRESSORS: [x ] YES [ ] NO  WHICH: Levophed     Antimicrobial:  cefepime   IVPB      cefepime   IVPB 2000 milliGRAM(s) IV Intermittent every 8 hours  vancomycin  IVPB 1000 milliGRAM(s) IV Intermittent every 12 hours        Antimicrobial:  cefepime   IVPB      cefepime   IVPB 2000 milliGRAM(s) IV Intermittent every 8 hours  vancomycin  IVPB 1000 milliGRAM(s) IV Intermittent every 12 hours    Cardiovascular:  norepinephrine Infusion 0.5 MICROgram(s)/kG/Min IV Continuous <Continuous>    Pulmonary:    Hematalogic:    Other:  acetaminophen    Suspension .. 650 milliGRAM(s) Oral every 6 hours PRN  artificial  tears Solution 1 Drop(s) Both EYES two times a day  dexmedetomidine Infusion 0.2 MICROgram(s)/kG/Hr IV Continuous <Continuous>  dextrose 50% Injectable 25 Gram(s) IV Push once  ferrous    sulfate Liquid 300 milliGRAM(s) Oral daily  insulin glargine Injectable (LANTUS) 10 Unit(s) SubCutaneous at bedtime  insulin lispro (HumaLOG) corrective regimen sliding scale   SubCutaneous every 6 hours  pantoprazole  Injectable 40 milliGRAM(s) IV Push two times a day  potassium phosphate IVPB 15 milliMole(s) IV Intermittent once  simvastatin 20 milliGRAM(s) Oral at bedtime    acetaminophen    Suspension .. 650 milliGRAM(s) Oral every 6 hours PRN  artificial  tears Solution 1 Drop(s) Both EYES two times a day  cefepime   IVPB      cefepime   IVPB 2000 milliGRAM(s) IV Intermittent every 8 hours  dexmedetomidine Infusion 0.2 MICROgram(s)/kG/Hr IV Continuous <Continuous>  dextrose 50% Injectable 25 Gram(s) IV Push once  ferrous    sulfate Liquid 300 milliGRAM(s) Oral daily  insulin glargine Injectable (LANTUS) 10 Unit(s) SubCutaneous at bedtime  insulin lispro (HumaLOG) corrective regimen sliding scale   SubCutaneous every 6 hours  norepinephrine Infusion 0.5 MICROgram(s)/kG/Min IV Continuous <Continuous>  pantoprazole  Injectable 40 milliGRAM(s) IV Push two times a day  potassium phosphate IVPB 15 milliMole(s) IV Intermittent once  simvastatin 20 milliGRAM(s) Oral at bedtime  vancomycin  IVPB 1000 milliGRAM(s) IV Intermittent every 12 hours    Drug Dosing Weight    Weight (kg): 66.6 (08 Sep 2018 11:22)    CENTRAL LINE: [ x] YES [ ] NO  LOCATION:   Right IJ  9/8 DATE INSERTED:    SANCHEZ: [x ] YES [ ] NO    DATE INSERTED:    A-LINE:  [ ] YES [x ] NO  LOCATION:   DATE INSERTED:    ICU Vital Signs Last 24 Hrs  T(C): 37.1 (11 Sep 2018 05:30), Max: 38.3 (10 Sep 2018 11:30)  T(F): 98.7 (11 Sep 2018 05:30), Max: 101 (10 Sep 2018 11:30)  HR: 74 (11 Sep 2018 08:00) (74 - 115)  BP: 102/63 (11 Sep 2018 08:00) (89/51 - 161/98)  BP(mean): 72 (11 Sep 2018 08:00) (61 - 110)  ABP: --  ABP(mean): --  RR: 9 (11 Sep 2018 08:00) (9 - 17)  SpO2: 100% (11 Sep 2018 08:00) (92% - 100%)      ABG - ( 10 Sep 2018 04:26 )  pH, Arterial: 7.35  pH, Blood: x     /  pCO2: 36    /  pO2: 119   / HCO3: 20    / Base Excess: -5.0  /  SaO2: 99                    09-10 @ 07:01  -  09-11 @ 07:00  --------------------------------------------------------  IN: 1421.1 mL / OUT: 1685 mL / NET: -263.9 mL        Mode: AC/ CMV (Assist Control/ Continuous Mandatory Ventilation)  RR (machine): 12  TV (machine): 450  FiO2: 40  PEEP: 5  ITime: 1  MAP: 8  PIP: 27      PHYSICAL EXAM:    GENERAL: No signs of distress, comfortable  HEAD: Atraumatic, Normocephalic  EYES: EOMI, PERRLA  ENMT: No erythema, exudates, or enlargement, Moist mucous membranes  NECK: Supple, normal appearance, No JVD; [  ] central line (if applicable)  CHEST/LUNG: No chest deformity, fair bilateral air entry; No rales, rhonchi, wheezing; crackles  HEART: Regular rate and rhythm; No murmurs, rubs, or gallops;   ABDOMEN: Soft, Nontender, Nondistended; Bowel sounds present  EXTREMITIES:  + Peripheral Pulses, No clubbing, cyanosis, or edema  NERVOUS SYSTEM: encephalopathic, open eyes to voice  LYMPH: No lymphadenopathy noted  SKIN: No rashes or lesions; good turgor, warm, dry      LABS:  CBC Full  -  ( 11 Sep 2018 06:28 )  WBC Count : 11.9 K/uL  Hemoglobin : 11.1 g/dL  Hematocrit : 34.0 %  Platelet Count - Automated : 214 K/uL  Mean Cell Volume : 86.9 fl  Mean Cell Hemoglobin : 28.3 pg  Mean Cell Hemoglobin Concentration : 32.6 gm/dL  Auto Neutrophil # : x  Auto Lymphocyte # : x  Auto Monocyte # : x  Auto Eosinophil # : x  Auto Basophil # : x  Auto Neutrophil % : x  Auto Lymphocyte % : x  Auto Monocyte % : x  Auto Eosinophil % : x  Auto Basophil % : x    09-11    137  |  108  |  6<L>  ----------------------------<  202<H>  4.0   |  20<L>  |  0.56    Ca    7.5<L>      11 Sep 2018 06:28  Phos  1.9     09-11  Mg     1.9     09-11    TPro  5.9<L>  /  Alb  2.2<L>  /  TBili  0.4  /  DBili  x   /  AST  14  /  ALT  22  /  AlkPhos  57  09-10        Culture Results:   No growth to date. (09-10 @ 09:54)  Culture Results:   No growth to date. (09-09 @ 11:37)      RADIOLOGY & ADDITIONAL STUDIES REVIEWED:  ***      CRITICAL CARE TIME SPENT: 35 minutes

## 2018-09-11 NOTE — PROGRESS NOTE ADULT - PROBLEM SELECTOR PLAN 1
secondary to PNA  Hypoxic respiratory failure   fever T max 101 in last 24 hours.   -blood culture NTD  CT abd: B/l lower lobe consolidation   c/w Iv antibiotics  MSSA +ve PCR MRSA -ve  f/u vanco trough on 9/11

## 2018-09-11 NOTE — PROGRESS NOTE ADULT - ASSESSMENT
Ac Respiratory Failure sec to encephalopathy ? drug induced like Valium, percocet s/p hypoglycemia r/o CVA  Hcvd with CHF  DM  Spine surgery  r/O CVA with Rt hemiplegia      Plan; D/c fluids, CT HEAD Repeat  ACEI, Lasix, Neutraphos  Cpap trial with fio2 35% if CT head -ve  s/C Lovenox  FS coverage  Hold strong analgesics and sedatives

## 2018-09-11 NOTE — CONSULT NOTE ADULT - ASSESSMENT
Encephalopathy, toxic metabolic, likely due to hypoglycemia and sepsis with improvement of mental status off sedation    Recommend:  continue with treatment for sepsis as per primary team  EEG  MRI brain to r/o stroke

## 2018-09-11 NOTE — PROGRESS NOTE ADULT - SUBJECTIVE AND OBJECTIVE BOX
Patient is seen and examined at the bed side, is afebrile now. She remains intubated and on pressors.  The Leukocytosis is worsening.      REVIEW OF SYSTEMS: Unable to obtain since intubated and not responsive        ALLERGIES: NKDA      ICU Vital Signs Last 24 Hrs  T(C): 37.6 (11 Sep 2018 15:59), Max: 37.6 (11 Sep 2018 15:59)  T(F): 99.6 (11 Sep 2018 15:59), Max: 99.6 (11 Sep 2018 15:59)  HR: 116 (11 Sep 2018 15:30) (74 - 133)  BP: 131/84 (11 Sep 2018 15:30) (96/55 - 166/99)  BP(mean): 95 (11 Sep 2018 15:30) (65 - 115)  ABP: --  ABP(mean): --  RR: 20 (11 Sep 2018 15:30) (9 - 22)  SpO2: 95% (11 Sep 2018 15:30) (92% - 100%)        PHYSICAL EXAM:  GENERAL: Intubated/vented  CVS: s1 and s2 present  RESP: Air entry B/L  GI: Abdomen non distended and Nontender  EXT: No pedal edema   CNS: Intubated /vented        LABS:                        11.1   11.9  )-----------( 214      ( 11 Sep 2018 06:28 )             34.0                           11.6   15.0  )-----------( 227      ( 10 Sep 2018 06:13 )             35.6                           11.4   13.4  )-----------( 219      ( 09 Sep 2018 09:38 )             33.7         11    137  |  108  |  6<L>  ----------------------------<  202<H>  4.0   |  20<L>  |  0.56    Ca    7.5<L>      11 Sep 2018 06:28  Phos  1.9       Mg     1.9     11    TPro  5.9<L>  /  Alb  2.2<L>  /  TBili  0.4  /  DBili  x   /  AST  14  /  ALT  22  /  AlkPhos  57  09-10    09-10    140  |  111<H>  |  6<L>  ----------------------------<  142<H>  3.9   |  21<L>  |  0.58    Ca    7.5<L>      10 Sep 2018 06:13  Phos  2.1     09-10  Mg     1.5     09-10    TPro  5.9<L>  /  Alb  2.2<L>  /  TBili  0.4  /  DBili  x   /  AST  14  /  ALT  22  /  AlkPhos  57  09-10      Color: Yellow / Appearance: Clear / S.010 / pH: x  Gluc: x / Ketone: Trace  / Bili: Negative / Urobili: Negative   Blood: x / Protein: 100 / Nitrite: Negative   Leuk Esterase: Trace / RBC: 0-2 /HPF / WBC 0-2 /HPF   Sq Epi: x / Non Sq Epi: Few /HPF / Bacteria: Negative /HPF      CAPILLARY BLOOD GLUCOSE      POCT Blood Glucose.: 166 mg/dL (09 Sep 2018 17:59)  POCT Blood Glucose.: 257 mg/dL (09 Sep 2018 13:45)  POCT Blood Glucose.: 301 mg/dL (09 Sep 2018 12:04)  POCT Blood Glucose.: 289 mg/dL (09 Sep 2018 06:24)  POCT Blood Glucose.: 198 mg/dL (08 Sep 2018 23:46)  POCT Blood Glucose.: 168 mg/dL (08 Sep 2018 22:29)  POCT Blood Glucose.: 163 mg/dL (08 Sep 2018 20:12)  POCT Blood Glucose.: 142 mg/dL (08 Sep 2018 18:59)    ABG - ( 09 Sep 2018 04:43 )  pH, Arterial: 7.29  pH, Blood: x     /  pCO2: 40    /  pO2: 108   / HCO3: 19    / Base Excess: -6.7  /  SaO2: 98            MEDICATIONS  (STANDING):  artificial  tears Solution 1 Drop(s) Both EYES two times a day  cefepime   IVPB      cefepime   IVPB 2000 milliGRAM(s) IV Intermittent every 8 hours  dexmedetomidine Infusion 0.2 MICROgram(s)/kG/Hr (3.33 mL/Hr) IV Continuous <Continuous>  dextrose 50% Injectable 25 Gram(s) IV Push once  ferrous    sulfate Liquid 300 milliGRAM(s) Oral daily  insulin glargine Injectable (LANTUS) 10 Unit(s) SubCutaneous at bedtime  insulin lispro (HumaLOG) corrective regimen sliding scale   SubCutaneous every 6 hours  lactulose Syrup 10 Gram(s) Oral two times a day  pantoprazole  Injectable 40 milliGRAM(s) IV Push two times a day  simvastatin 20 milliGRAM(s) Oral at bedtime  vancomycin  IVPB 1000 milliGRAM(s) IV Intermittent every 12 hours    MEDICATIONS  (PRN):  acetaminophen    Suspension .. 650 milliGRAM(s) Oral every 6 hours PRN Temp greater or equal to 38C (100.4F), Mild Pain (1 - 3)        RADIOLOGY & ADDITIONAL TESTS:    9/10/18: Xray Chest 1 View- PORTABLE-Routine (09.10.18 @ 06:59) ET and enteric tubes, and right IJ catheter remain in place. No pneumothorax. Small layering left pleural effusion has increased. Small right pleural effusion is unchanged. Mild pulmonary vascular congestion and scattered  lung opacities are similar to the prior study.    18 : Xray Chest 1 View- PORTABLE-Routine (18 @ 10:01) Mild pulmonary venous congestion, increased since the prior study. No pleural effusions . Hazy opacities in the lower lobes could   represent atelectasis or infiltrates.      18 : CT Abdomen and Pelvis No Cont (18 @ 03:24) No retroperitoneal hemorrhage. No bowel obstruction. Extensive bilateral lower lobe consolidation. Differential diagnosis   includes multifocal pneumonia, aspiration pneumonia, alveolar hemorrhage. Mild pulmonary venous congestion.      18 : CT Head No Cont (18 @ 07:55) There is no evidence of hydrocephalus, mass effect or any sizable extra-axial collection. Due to motion artifact, subtle abnormalities       MICROBIOLOGY DATA:    MRSA/MSSA PCR (09.10.18 @ 10:01)    MRSA PCR Result.: NotDetec: MRSA/MSSA PCR assay is a qualitative in vitro diagnostic test for the  direct detection and differentiation of methicillin-resistant  Staphylococcus aureus (MRSA) from Staphylococcus aureus (SA). The assay  detects DNA from nasal swabs in patients atrisk for nasal colonization.  It is not intended to diagnose MRSA or SA infections nor guide or monitor  treatment for MRSA/SA infections. A negative result does not preclude  nasal colonization. The assay is FDA-approved and its performance has  been established by [employer], [place] and the [facility] [employer]  [facility], [place], [place].    Staph Aureus PCR Result: Detected    Culture - Sputum . (09.10.18 @ 09:54)    Gram Stain:   No polymorphonuclear leukocytes per low power field  No Squamous epithelial cells per low power field  No organisms seen per oil power field    Specimen Source: .Sputum Sputum, trap    Culture Results:   No growth to date.    Rapid Respiratory Viral Panel (18 @ 18:24)    Rapid RVP Result: NotDetec: The FilmArray RVP Rapid uses polymerase chain reaction (PCR) and melt  curve analysis to screen for adenovirus; coronavirus HKU1, NL63, 229E,  OC43; human metapneumovirus (hMPV); human enterovirus/rhinovirus  (Entero/RV); influenza A; influenza A/H1;influenza A/H3; influenza  A/H1-2009; influenza B; parainfluenza viruses 1, 2, 3, 4; respiratory  syncytial virus; Bordetella pertussis; Mycoplasma pneumoniae; and  Chlamydophila pneumoniae.      Culture - Blood (18 @ 11:37)    Specimen Source: .Blood Blood-Peripheral    Culture Results:   No growth to date. Patient is seen and examined at the bed side, is afebrile. She is s/p Extubated today, saturating well on nasal canula.  The Leukocytosis is trending down.      REVIEW OF SYSTEMS: Unable to obtain since intubated and not responsive        ALLERGIES: NKDA      ICU Vital Signs Last 24 Hrs  T(C): 37.6 (11 Sep 2018 15:59), Max: 37.6 (11 Sep 2018 15:59)  T(F): 99.6 (11 Sep 2018 15:59), Max: 99.6 (11 Sep 2018 15:59)  HR: 116 (11 Sep 2018 15:30) (74 - 133)  BP: 131/84 (11 Sep 2018 15:30) (96/55 - 166/99)  BP(mean): 95 (11 Sep 2018 15:30) (65 - 115)  ABP: --  ABP(mean): --  RR: 20 (11 Sep 2018 15:30) (9 - 22)  SpO2: 95% (11 Sep 2018 15:30) (92% - 100%)        PHYSICAL EXAM:  GENERAL: Not in distress  CVS: s1 and s2 present  RESP: Air entry B/L  GI: Abdomen non distended and Nontender  EXT: No pedal edema   CNS: Awake and Alert        LABS:                        11.1   11.9  )-----------( 214      ( 11 Sep 2018 06:28 )             34.0                           11.6   15.0  )-----------( 227      ( 10 Sep 2018 06:13 )             35.6                           11.4   13.4  )-----------( 219      ( 09 Sep 2018 09:38 )             33.7         11    137  |  108  |  6<L>  ----------------------------<  202<H>  4.0   |  20<L>  |  0.56    Ca    7.5<L>      11 Sep 2018 06:28  Phos  1.9       Mg     1.9     11    TPro  5.9<L>  /  Alb  2.2<L>  /  TBili  0.4  /  DBili  x   /  AST  14  /  ALT  22  /  AlkPhos  57  09-10    09-10    140  |  111<H>  |  6<L>  ----------------------------<  142<H>  3.9   |  21<L>  |  0.58    Ca    7.5<L>      10 Sep 2018 06:13  Phos  2.1     09-10  Mg     1.5     09-10    TPro  5.9<L>  /  Alb  2.2<L>  /  TBili  0.4  /  DBili  x   /  AST  14  /  ALT  22  /  AlkPhos  57  09-10      Color: Yellow / Appearance: Clear / S.010 / pH: x  Gluc: x / Ketone: Trace  / Bili: Negative / Urobili: Negative   Blood: x / Protein: 100 / Nitrite: Negative   Leuk Esterase: Trace / RBC: 0-2 /HPF / WBC 0-2 /HPF   Sq Epi: x / Non Sq Epi: Few /HPF / Bacteria: Negative /HPF      CAPILLARY BLOOD GLUCOSE      POCT Blood Glucose.: 166 mg/dL (09 Sep 2018 17:59)  POCT Blood Glucose.: 257 mg/dL (09 Sep 2018 13:45)  POCT Blood Glucose.: 301 mg/dL (09 Sep 2018 12:04)  POCT Blood Glucose.: 289 mg/dL (09 Sep 2018 06:24)  POCT Blood Glucose.: 198 mg/dL (08 Sep 2018 23:46)  POCT Blood Glucose.: 168 mg/dL (08 Sep 2018 22:29)  POCT Blood Glucose.: 163 mg/dL (08 Sep 2018 20:12)  POCT Blood Glucose.: 142 mg/dL (08 Sep 2018 18:59)    ABG - ( 09 Sep 2018 04:43 )  pH, Arterial: 7.29  pH, Blood: x     /  pCO2: 40    /  pO2: 108   / HCO3: 19    / Base Excess: -6.7  /  SaO2: 98            MEDICATIONS  (STANDING):  artificial  tears Solution 1 Drop(s) Both EYES two times a day  cefepime   IVPB      cefepime   IVPB 2000 milliGRAM(s) IV Intermittent every 8 hours  dexmedetomidine Infusion 0.2 MICROgram(s)/kG/Hr (3.33 mL/Hr) IV Continuous <Continuous>  dextrose 50% Injectable 25 Gram(s) IV Push once  ferrous    sulfate Liquid 300 milliGRAM(s) Oral daily  insulin glargine Injectable (LANTUS) 10 Unit(s) SubCutaneous at bedtime  insulin lispro (HumaLOG) corrective regimen sliding scale   SubCutaneous every 6 hours  lactulose Syrup 10 Gram(s) Oral two times a day  pantoprazole  Injectable 40 milliGRAM(s) IV Push two times a day  simvastatin 20 milliGRAM(s) Oral at bedtime  vancomycin  IVPB 1000 milliGRAM(s) IV Intermittent every 12 hours    MEDICATIONS  (PRN):  acetaminophen    Suspension .. 650 milliGRAM(s) Oral every 6 hours PRN Temp greater or equal to 38C (100.4F), Mild Pain (1 - 3)        RADIOLOGY & ADDITIONAL TESTS:    9/10/18: Xray Chest 1 View- PORTABLE-Routine (09.10.18 @ 06:59) ET and enteric tubes, and right IJ catheter remain in place. No pneumothorax. Small layering left pleural effusion has increased. Small right pleural effusion is unchanged. Mild pulmonary vascular congestion and scattered  lung opacities are similar to the prior study.    18 : Xray Chest 1 View- PORTABLE-Routine (18 @ 10:01) Mild pulmonary venous congestion, increased since the prior study. No pleural effusions . Hazy opacities in the lower lobes could   represent atelectasis or infiltrates.      18 : CT Abdomen and Pelvis No Cont (18 @ 03:24) No retroperitoneal hemorrhage. No bowel obstruction. Extensive bilateral lower lobe consolidation. Differential diagnosis   includes multifocal pneumonia, aspiration pneumonia, alveolar hemorrhage. Mild pulmonary venous congestion.      18 : CT Head No Cont (18 @ 07:55) There is no evidence of hydrocephalus, mass effect or any sizable extra-axial collection. Due to motion artifact, subtle abnormalities       MICROBIOLOGY DATA:    MRSA/MSSA PCR (09.10.18 @ 10:01)    MRSA PCR Result.: NotDetec: MRSA/MSSA PCR assay is a qualitative in vitro diagnostic test for the  direct detection and differentiation of methicillin-resistant  Staphylococcus aureus (MRSA) from Staphylococcus aureus (SA). The assay  detects DNA from nasal swabs in patients atrisk for nasal colonization.  It is not intended to diagnose MRSA or SA infections nor guide or monitor  treatment for MRSA/SA infections. A negative result does not preclude  nasal colonization. The assay is FDA-approved and its performance has  been established by [employer] [place], [place] and the [facility] [employer]  [facility], [place], [place].    Staph Aureus PCR Result: Detected    Culture - Sputum . (09.10.18 @ 09:54)    Gram Stain:   No polymorphonuclear leukocytes per low power field  No Squamous epithelial cells per low power field  No organisms seen per oil power field    Specimen Source: .Sputum Sputum, trap    Culture Results:   No growth to date.    Rapid Respiratory Viral Panel (18 @ 18:24)    Rapid RVP Result: NotDetec: The FilmArray RVP Rapid uses polymerase chain reaction (PCR) and melt  curve analysis to screen for adenovirus; coronavirus HKU1, NL63, 229E,  OC43; human metapneumovirus (hMPV); human enterovirus/rhinovirus  (Entero/RV); influenza A; influenza A/H1;influenza A/H3; influenza  A/H1-2009; influenza B; parainfluenza viruses 1, 2, 3, 4; respiratory  syncytial virus; Bordetella pertussis; Mycoplasma pneumoniae; and  Chlamydophila pneumoniae.      Culture - Blood (18 @ 11:37)    Specimen Source: .Blood Blood-Peripheral    Culture Results:   No growth to date.

## 2018-09-11 NOTE — CONSULT NOTE ADULT - CONSULT REQUESTED DATE/TIME
08-Sep-2018 11:03
09-Sep-2018 11:08
09-Sep-2018 18:37
10-Sep-2018 09:05
11-Sep-2018 13:28
11-Sep-2018 14:05

## 2018-09-11 NOTE — CONSULT NOTE ADULT - PROBLEM SELECTOR RECOMMENDATION 2
Patient presented with unresponsiveness 2/2 hypoglycemia and was emergently intubated. Patient weaning off sedation - eyes open, minimally responsive during exam. 2/2 hypoglycemia/sepsis. Mental status improving with d/c of sedation. Neuro following. Patient's  is surrogate.

## 2018-09-11 NOTE — PROGRESS NOTE ADULT - SUBJECTIVE AND OBJECTIVE BOX
JACQUELIN LINDER  MRN-[id number]    Patient is a 57y old  Female who presents with a chief complaint of unresponsiveness (08 Sep 2018 13:02)  patient seen and examined in icu    s off vent this am   .MEDICATIONS  (STANDING):  artificial  tears Solution 1 Drop(s) Both EYES two times a day  cefepime   IVPB      cefepime   IVPB 2000 milliGRAM(s) IV Intermittent every 8 hours  dexmedetomidine Infusion 0.2 MICROgram(s)/kG/Hr (3.33 mL/Hr) IV Continuous <Continuous>  dextrose 50% Injectable 25 Gram(s) IV Push once  ferrous    sulfate Liquid 300 milliGRAM(s) Oral daily  insulin glargine Injectable (LANTUS) 10 Unit(s) SubCutaneous at bedtime  insulin lispro (HumaLOG) corrective regimen sliding scale   SubCutaneous every 6 hours  lactulose Syrup 10 Gram(s) Oral two times a day  pantoprazole  Injectable 40 milliGRAM(s) IV Push two times a day  simvastatin 20 milliGRAM(s) Oral at bedtime  vancomycin  IVPB 1000 milliGRAM(s) IV Intermittent every 12 hours    MEDICATIONS  (PRN):  acetaminophen    Suspension .. 650 milliGRAM(s) Oral every 6 hours PRN Temp greater or equal to 38C (100.4F), Mild Pain (1 - 3)        Allergies    No Known Allergies    Intolerances        PAST MEDICAL & SURGICAL HISTORY:  Cardiac disease  DM (diabetes mellitus)  HTN (hypertension)  H/O cervical spine surgery      FAMILY HISTORY:  Family history unknown      SOCIAL HISTORY  Smoking History:     REVIEW OF SYSTEMS:    CONSTITUTIONAL:  Fevers [ x]  Yes  [  ]  No                                   chills [  ]  Yes  [X  ]  No                               sweats  [  ]  Yes  [ X ]  No                                fatigue [  ]  Yes  [ X ]  No    HEENT:  Eyes:  Diplopia or blurred vision [  ]  Yes  [  ]  No    ENT:                        earache  [  ]  Yes  [  ]  No                             sore throat  [  ]  Yes  [  ]  No                            runny nose.  [  ]  Yes  [  ]  No    CARDIOVASCULAR:       chest pain  [  ]  Yes  [  ]  No                        squeezing, tightness,  [  ]  Yes  [  ]  No                                      palpitations.  [  ]  Yes  [  ]  No    RESPIRATORY:             Cough [  x]  Yes  [  ]  No                                  Wheezing [  ]  Yes  [  ]  No                                Hemoptysis [  ]  Yes  [  ]  No                                      Sputum [  ]  Yes  [  ]  No                   Shortness of Breathe [  ]  Yes  [  ]  No    GASTROINTESTINAL:      Abdominal pain  [  ]  Yes  [  ]  No                                                    Nausea  [  ]  Yes  [  ]  No                                                   Vomiting [  ]  Yes  [  ]  No                                              Constipation [  ]  Yes  [  ]  No                                                    Diarrhea [  ]  Yes  [  ]  No    GENITOURINARY:           Incontinence [  ]  Yes  [  ]  No                                                Dysuria [  ]  Yes  [  ]  No                                      Blood in Urine  [  ]  Yes  [  ]  No                          Frequency or urgency.  [  ]  Yes  [  ]  No    NEUROLOGIC:   lethargic  AT PRESENT                  Paresthesias  [  ]  Yes  [  ]  No                                             fasciculations  [  ]  Yes  [  ]  No                                seizures or weakness.  [  ]  Yes  [  ]  No                                                   Headache [  ]  Yes  [  ]  No                                  Loss of Conciousness [  ]  Yes  [  ]  No                                                     Insomnia [  ]  Yes  [  ]  No    PSYCHIATRIC:    Disorder of thought or mood.  [  ]  Yes  [  ]  No                                                           Anxiety [  ]  Yes  [  ]  No                                                      Depression [  ]  Yes  [  ]  No                                                         Dementia [  ]  Yes  [  ]  No  ..ICU Vital Signs Last 24 Hrs  T(C): 37.4 (11 Sep 2018 09:30), Max: 38.3 (10 Sep 2018 11:30)  T(F): 99.4 (11 Sep 2018 09:30), Max: 101 (10 Sep 2018 11:30)  HR: 110 (11 Sep 2018 10:30) (74 - 115)  BP: 135/79 (11 Sep 2018 10:30) (89/51 - 166/99)  BP(mean): 93 (11 Sep 2018 10:30) (61 - 115)  ABP: --  ABP(mean): --  RR: 18 (11 Sep 2018 10:30) (9 - 18)  SpO2: 97% (11 Sep 2018 10:30) (92% - 100%)          PHYSICAL EXAMINATION:    GENERAL: The patient is a well-developed, well-nourished _____in no apparent distress.     HEENT:  n perrla    NECK: Supple. jvd [  ]  Yes  [  X]  No    LUNGS: Clear to auscultation  [  ]  Yes  [  x  No                               wheezing, [  ]  Yes  [X  ]  No                                      rales  [  ]  Yes  [  ]X  No                                    rhonchi  [ x ]  Yes  [  ]  No                 Respirations labored  [  ]  Yes  [  ]  No    HEART: Regular rate and rhythm  [ X ]  Yes  [  ]  No                                        Murmur [  ]  Yes  [  X]  No                                              rub  [  ]  Yes  [ X ]  No                                          gallop  [  ]  Yes  [  ]X  No    ABDOMEN:                                 Soft, X[  ]  Yes  [  ]  No                                             nontender [X  ]  Yes  [  ]  No                                             distended.[  ]  Yes  [X  ]  No                            hepatosplenomegaly ,[  ]  Yes  [ X ]  No                                                    BS   [  ]  Yes  [  ]  No    EXTREMITIES:                cyanosis, [  ]  Yes  [ X ]  No                                       clubbing,   [  ]  Yes  [  X]  No                                               rash, [  ]  Yes  [ X ]  No                                           edema.  [  ]  Yes  [ X ]  No    NEUROLOGIC:   lathergic  LABS:                        11.1   11.9  )-----------( 214      ( 11 Sep 2018 06:28 )             34.0     09-11    137  |  108  |  6<L>  ----------------------------<  202<H>  4.0   |  20<L>  |  0.56    Ca    7.5<L>      11 Sep 2018 06:28  Phos  1.9     -11  Mg     1.9     11    TPro  5.9<L>  /  Alb  2.2<L>  /  TBili  0.4  /  DBili  x   /  AST  14  /  ALT  22  /  AlkPhos  57  -10        ABG - ( 10 Sep 2018 04:26 )  pH, Arterial: 7.35  pH, Blood: x     /  pCO2: 36    /  pO2: 119   / HCO3: 20    / Base Excess: -5.0  /  SaO2: 99                                LABS:                        11.6   15.0  )-----------( 227      ( 10 Sep 2018 06:13 )             35.6     09-10    140  |  111<H>  |  6<L>  ----------------------------<  142<H>  3.9   |  21<L>  |  0.58    Ca    7.5<L>      10 Sep 2018 06:13  Phos  1.1     09-10  Mg     1.6     10    TPro  5.9<L>  /  Alb  2.2<L>  /  TBili  0.4  /  DBili  x   /  AST  14  /  ALT  22  /  AlkPhos  57  09-10    PT/INR - ( 09 Sep 2018 00:36 )   PT: 13.8 sec;   INR: 1.26 ratio           Urinalysis Basic - ( 08 Sep 2018 15:09 )    Color: Yellow / Appearance: Clear / S.010 / pH: x  Gluc: x / Ketone: Trace  / Bili: Negative / Urobili: Negative   Blood: x / Protein: 100 / Nitrite: Negative   Leuk Esterase: Trace / RBC: 0-2 /HPF / WBC 0-2 /HPF   Sq Epi: x / Non Sq Epi: Few /HPF / Bacteria: Negative /HPF      ABG - ( 10 Sep 2018 04:26 )  pH, Arterial: 7.35  pH, Blood: x     /  pCO2: 36    /  pO2: 119   / HCO3: 20    / Base Excess: -5.0  /  SaO2: 99                                LABS:                        11.4   13.4  )-----------( 219      ( 09 Sep 2018 09:38 )             33.7         141  |  112<H>  |  15  ----------------------------<  172<H>  3.4<L>   |  20<L>  |  0.74    Ca    6.7<L>      09 Sep 2018 00:36    TPro  8.3  /  Alb  3.7  /  TBili  <0.1<L>  /  DBili  x   /  AST  25  /  ALT  44  /  AlkPhos  85  08    PT/INR - ( 09 Sep 2018 00:36 )   PT: 13.8 sec;   INR: 1.26 ratio         PTT - ( 08 Sep 2018 08:27 )  PTT:34.2 sec  Urinalysis Basic - ( 08 Sep 2018 15:09 )    Color: Yellow / Appearance: Clear / S.010 / pH: x  Gluc: x / Ketone: Trace  / Bili: Negative / Urobili: Negative   Blood: x / Protein: 100 / Nitrite: Negative   Leuk Esterase: Trace / RBC: 0-2 /HPF / WBC 0-2 /HPF   Sq Epi: x / Non Sq Epi: Few /HPF / Bacteria: Negative /HPF      ABG - ( 09 Sep 2018 04:43 )  pH, Arterial: 7.29  pH, Blood: x     /  pCO2: 40    /  pO2: 108   / HCO3: 19    / Base Excess: -6.7  /  SaO2: 98                CARDIAC MARKERS ( 08 Sep 2018 08:27 )  <0.015 ng/mL / x     / x     / x     / x        < from: Xray Chest 1 View- PORTABLE-Routine (18 @ 10:01) >  IMPRESSION:  Mild pulmonary venous congestion.  .    < from: CT Abdomen and Pelvis No Cont (18 @ 03:24) >  IMPRESSION:  No retroperitoneal hemorrhage. No bowel obstruction.    Extensive bilateral lower lobe consolidation. Differential diagnosis   includes multifocal pneumonia, aspiration pneumonia, alveolar hemorrhage.    Mild pulmonary venous congestion.        < end of copied text >      Lactate, Blood: 1.3 mmol/L (18 @ 00:36)          LABS:                        11.6   11.3  )-----------( 329      ( 08 Sep 2018 08:27 )             35.5         136  |  104  |  20<H>  ----------------------------<  151<H>  4.6   |  23  |  0.77    Ca    8.9      08 Sep 2018 08:27    TPro  8.3  /  Alb  3.7  /  TBili  <0.1<L>  /  DBili  x   /  AST  25  /  ALT  44  /  AlkPhos  85      PT/INR - ( 08 Sep 2018 08:27 )   PT: 10.1 sec;   INR: 0.93 ratio         PTT - ( 08 Sep 2018 08:27 )  PTT:34.2 sec  Urinalysis Basic - ( 08 Sep 2018 15:09 )    Color: Yellow / Appearance: Clear / S.010 / pH: x  Gluc: x / Ketone: Trace  / Bili: Negative / Urobili: Negative   Blood: x / Protein: 100 / Nitrite: Negative   Leuk Esterase: Trace / RBC: 0-2 /HPF / WBC 0-2 /HPF   Sq Epi: x / Non Sq Epi: Few /HPF / Bacteria: Negative /HPF      ABG - ( 08 Sep 2018 10:08 )  pH, Arterial: 7.40  pH, Blood: x     /  pCO2: 36    /  pO2: 92    / HCO3: 22    / Base Excess: -1.7  /  SaO2: 97                CARDIAC MARKERS ( 08 Sep 2018 08:27 )  <0.015 ng/mL / x     / x     / x     / x              Lactate, Blood: 0.6 mmol/L (18 @ 08:27)        MICROBIOLOGY:    RADIOLOGY & ADDITIONAL STUDIES:    CXR:  < from: Xray Chest 1 View AP/PA (18 @ 10:06) >  Impression: Successful placement of ET tube and NG tube  Question of developing infiltrate at the right lung base.      < from: CT Head No Cont (18 @ 07:55) >  IMPRESSION:     Limited study demonstrates no obvious abnormality as described above.            < end of copied text >    < end of copied text >    Ct scan chest:    ekg;    echo:

## 2018-09-11 NOTE — CONSULT NOTE ADULT - PROBLEM SELECTOR RECOMMENDATION 3
c/w Iv antibiotics.
Patient s/p C-spine surgery and was sent from [facility]. Patient extubated this morning, remains encephalopathic.

## 2018-09-11 NOTE — PROGRESS NOTE ADULT - PROBLEM SELECTOR PLAN 6
-Home meds includes Basaglar 38 units at bedtime, Humalog 5 units pre meals and correctional scale   -Would only start on Humalog correction scale for now  -Monitor fs every 6 hours for now and adjust regimen as needed  -Started Lantus 10U HS  -Hba1c is 7.6  endocrine Dr evangelista following **

## 2018-09-11 NOTE — PROGRESS NOTE ADULT - ASSESSMENT
A 58 yo female who was sent in to the ER from [facility] for evaluation of hypoglycemia and unresponsiveness. As per EMS, patient was thought to have low blood sugars so was treated for that without improvement in mental status. Hence, intubated by ED attending and admitted to ICU for further management. She found to have Leukocytosis, fever and B/L extensive Lower lobe infiltrate. She has started on Ceftriaxone and Doxycyline and The ID consult requested to assist with further evaluation and antibiotic management.    # Septic shock- on pressor  # B/L Lower lobe Pneumonia - MRSA/MSSA PCR is negative    Would recommend:    1. Discontinue Vancomycin since MRSA PCR is negative  2. Continue Cefepime    3. Aspiration precaution  4. Management of Vent as per ICU    d/w ICU team    will follow the patient with you A 56 yo female who was sent in to the ER from [facility] for evaluation of hypoglycemia and unresponsiveness. As per EMS, patient was thought to have low blood sugars so was treated for that without improvement in mental status. Hence, intubated by ED attending and admitted to ICU for further management. She found to have Leukocytosis, fever and B/L extensive Lower lobe infiltrate. She has started on Ceftriaxone and Doxycyline and The ID consult requested to assist with further evaluation and antibiotic management.    # Septic shock- on pressor  # B/L Lower lobe Pneumonia - MRSA/MSSA PCR is negative    Would recommend:    1. Discontinue Vancomycin since MRSA PCR is negative  2. Continue Cefepime  inpatient , May change to oral Abx on discharge to complete the course  3. Aspiration precaution  4. Continue supplemental oxygenation and bronchodilator as needed    d/w ICU team    will follow the patient with you

## 2018-09-11 NOTE — CONSULT NOTE ADULT - CONSULT REASON
Hypoglycemia/ AMS/Encephalopathy
Septic shock/Pneumonia
goals of care
hypoglycemia
intubated
unresponsiveness

## 2018-09-11 NOTE — CONSULT NOTE ADULT - PROBLEM SELECTOR RECOMMENDATION 4
Patient's  at bedside - updated on status. Recommend completion of MOLST prior to d/c. Patient remains a full code.

## 2018-09-11 NOTE — PROGRESS NOTE ADULT - SUBJECTIVE AND OBJECTIVE BOX
PULMONARY  progress note    JACQUELIN LINDER  MRN-[id number]    Patient is a 57y old  Female who presents with a chief complaint of unresponsiveness (11 Sep 2018 08:08)  Pt intubated, arousable      MEDICATIONS  (STANDING):  artificial  tears Solution 1 Drop(s) Both EYES two times a day  cefepime   IVPB      cefepime   IVPB 2000 milliGRAM(s) IV Intermittent every 8 hours  dexmedetomidine Infusion 0.2 MICROgram(s)/kG/Hr (3.33 mL/Hr) IV Continuous <Continuous>  dextrose 50% Injectable 25 Gram(s) IV Push once  ferrous    sulfate Liquid 300 milliGRAM(s) Oral daily  insulin glargine Injectable (LANTUS) 10 Unit(s) SubCutaneous at bedtime  insulin lispro (HumaLOG) corrective regimen sliding scale   SubCutaneous every 6 hours  norepinephrine Infusion 0.5 MICROgram(s)/kG/Min (31.219 mL/Hr) IV Continuous <Continuous>  pantoprazole  Injectable 40 milliGRAM(s) IV Push two times a day  potassium phosphate IVPB 15 milliMole(s) IV Intermittent once  simvastatin 20 milliGRAM(s) Oral at bedtime  vancomycin  IVPB 1000 milliGRAM(s) IV Intermittent every 12 hours      MEDICATIONS  (PRN):  acetaminophen    Suspension .. 650 milliGRAM(s) Oral every 6 hours PRN Temp greater or equal to 38C (100.4F), Mild Pain (1 - 3)      Allergies    No Known Allergies      PAST MEDICAL & SURGICAL HISTORY:  Cardiac disease  DM (diabetes mellitus)  HTN (hypertension)  H/O cervical spine surgery           REVIEW OF SYSTEMS: as per RN  CONSTITUTIONAL: No fever, weight loss, or fatigue   EYES: No eye pain, visual disturbances, or discharge  ENT:  No difficulty hearing, tinnitus, vertigo; No sinus or throat pain  NECK: No pain or stiffness or nodes  RESPIRATORY:  cough--   wheezing --  chills--   hemoptysis--  Shortness of Breath--  CARDIOVASCULAR: No chest pain, palpitations, passing out, dizziness, or leg swelling  GASTROINTESTINAL: No abdominal or epigastric pain. No nausea, vomiting, or hematemesis; No diarrhea or constipation. No melena or hematochezia.  GENITOURINARY: No dysuria, frequency, hematuria, or incontinence  HEME/LYMPH: No easy bruising, or bleeding gums  ALLERGY AND IMMUNOLOGIC: No hives or eczema    Vital Signs Last 24 Hrs  T(C): 37.1 (11 Sep 2018 05:30), Max: 38.3 (10 Sep 2018 11:30)  T(F): 98.7 (11 Sep 2018 05:30), Max: 101 (10 Sep 2018 11:30)  HR: 74 (11 Sep 2018 08:00) (74 - 115)  BP: 102/63 (11 Sep 2018 08:00) (89/51 - 161/98)  BP(mean): 72 (11 Sep 2018 08:00) (61 - 110)  RR: 9 (11 Sep 2018 08:00) (9 - 17)  SpO2: 100% (11 Sep 2018 08:00) (92% - 100%)  I&O's Detail    10 Sep 2018 07:01  -  11 Sep 2018 07:00  --------------------------------------------------------  IN:    dexmedetomidine Infusion: 113 mL    fentaNYL Infusion.: 40 mL    IV PiggyBack: 300 mL    norepinephrine Infusion: 18.1 mL    Solution: 500 mL    Solution: 250 mL    Solution: 100 mL    Solution: 100 mL  Total IN: 1421.1 mL    OUT:    Indwelling Catheter - Urethral: 1685 mL  Total OUT: 1685 mL    Total NET: -263.9 mL      11 Sep 2018 07:01  -  11 Sep 2018 09:44  --------------------------------------------------------  IN:    dexmedetomidine Infusion: 10.8 mL    Solution: 100 mL  Total IN: 110.8 mL    OUT:  Total OUT: 0 mL    Total NET: 110.8 mL          PHYSICAL EXAMINATION:    GENERAL: The patient is a well-developed, well-nourished in no apparent distress. Intubated on CMV12/$%) TV/40% with PP 25-27  SKIN no rash ecchymoses or bruises  HEENT: Head is normocephalic and atraumatic  SYDNI , Extraocular muscles are intact. Mucous membranes  moist.   Neck supple ,No LN felt JVP not increased  Thyroid not enlarged,  Rt IJ in place  Cardiovascular:  S1 S2 heard, RSR, No JVD , systolic  murmur at apex, No gallop or rub  Respiratory: Chest wall symmetrical with good air entry ,Percussion note normal,    Lungs vesicular breathing with  lt basilar  rales , no   wheeze	  ABDOMEN:  Soft, Non-tender, NGT in place  no hepatomegaly or splenomegaly BS positive	  Extremities: Normal range of motion, No clubbing, cyanosis or edema  Vascular: Peripheral pulses palpable 2+ bilaterally  CNS:  Lethargic but arousable Rt sided weakness  dtr 2+   Babinski  withdrawl    LABS:                        11.1   11.9  )-----------( 214      ( 11 Sep 2018 06:28 )             34.0     09-11    137  |  108  |  6<L>  ----------------------------<  202<H>  4.0   |  20<L>  |  0.56    Ca    7.5<L>      11 Sep 2018 06:28  Phos  1.9     09-11  Mg     1.9     09-11    TPro  5.9<L>  /  Alb  2.2<L>  /  TBili  0.4  /  DBili  x   /  AST  14  /  ALT  22  /  AlkPhos  57  09-10        ABG - ( 10 Sep 2018 04:26 )  pH, Arterial: 7.35  pH, Blood: x     /  pCO2: 36    /  pO2: 119   / HCO3: 20    / Base Excess: -5.0  /  SaO2: 99            MICROBIOLOGY:    Culture - Sputum (collected 09-10-18 @ 09:54)  Source: .Sputum Sputum, trap  Gram Stain (09-10-18 @ 14:55):    No polymorphonuclear leukocytes per low power field    No Squamous epithelial cells per low power field    No organisms seen per oil power field  Preliminary Report (09-11-18 @ 07:45):    No growth to date.    Culture - Blood (collected 09-09-18 @ 11:37)  Source: .Blood Blood-Peripheral  Preliminary Report (09-10-18 @ 12:00):    No growth to date.          RADIOLOGY & ADDITIONAL STUDIES:    CXR: < from: Xray Chest 1 View- PORTABLE-Routine (09.10.18 @ 06:59) >  ET and enteric tubes, and right IJ catheter remain in place. No   pneumothorax    Small layering left pleural effusion has increased. Small right pleural   effusion is unchanged. Mild pulmonary vascular congestion and scattered   lung opacities are similar to the prior study.    The cardiac silhouette is not accurately assessed by AP technique.

## 2018-09-11 NOTE — PROGRESS NOTE ADULT - SUBJECTIVE AND OBJECTIVE BOX
INTERVAL HPI/OVERNIGHT EVENTS:       Antimicrobial:  cefepime   IVPB      cefepime   IVPB 2000 milliGRAM(s) IV Intermittent every 8 hours    Cardiovascular:    Pulmonary:    Hematalogic:    Other:  acetaminophen    Suspension .. 650 milliGRAM(s) Oral every 6 hours PRN  artificial  tears Solution 1 Drop(s) Both EYES two times a day  dexmedetomidine Infusion 0.2 MICROgram(s)/kG/Hr IV Continuous <Continuous>  dextrose 50% Injectable 25 Gram(s) IV Push once  ferrous    sulfate Liquid 300 milliGRAM(s) Oral daily  insulin glargine Injectable (LANTUS) 10 Unit(s) SubCutaneous at bedtime  insulin lispro (HumaLOG) corrective regimen sliding scale   SubCutaneous every 6 hours  lactulose Syrup 10 Gram(s) Oral two times a day  pantoprazole  Injectable 40 milliGRAM(s) IV Push two times a day  simvastatin 20 milliGRAM(s) Oral at bedtime      Drug Dosing Weight    Weight (kg): 66.6 (08 Sep 2018 11:22)    CENTRAL LINE: [x ] YES [ ] NO  LOCATION:   DATE INSERTED:    SANCHEZ: [ ] YES [ ] NO    DATE INSERTED:    A-LINE:  [ ] YES [ ] NO  LOCATION:   DATE INSERTED:    PMH/Social Hx/Fam Hx -reviewed admission note, no change since admission  PAST MEDICAL & SURGICAL HISTORY:  Cardiac disease  DM (diabetes mellitus)  HTN (hypertension)  H/O cervical spine surgery      T(C): 37.6 (09-11-18 @ 15:59), Max: 37.6 (09-11-18 @ 15:59)  HR: 116 (09-11-18 @ 15:30)  BP: 131/84 (09-11-18 @ 15:30)  BP(mean): 95 (09-11-18 @ 15:30)  ABP: --  ABP(mean): --  RR: 20 (09-11-18 @ 15:30)  SpO2: 95% (09-11-18 @ 15:30)  Wt(kg): --    ABG - ( 10 Sep 2018 04:26 )  pH, Arterial: 7.35  pH, Blood: x     /  pCO2: 36    /  pO2: 119   / HCO3: 20    / Base Excess: -5.0  /  SaO2: 99                    09-10 @ 07:01  -  09-11 @ 07:00  --------------------------------------------------------  IN: 1421.1 mL / OUT: 1760 mL / NET: -338.9 mL        Mode: CPAP with PS  FiO2: 40  PEEP: 5  PS: 10      PHYSICAL EXAM:    GENERAL: No signs of distress, comfortable  HEAD: Atraumatic, Normocephalic  EYES: EOMI, PERRLA  ENMT: No erythema, exudates, or enlargement, Moist mucous membranes + ETT  NECK: Supple, normal appearance, No JVD; [  ] central line (if applicable)  CHEST/LUNG: No chest deformity, fair bilateral air entry; No rales, rhonchi, wheezing; crackles  HEART: Regular rate and rhythm; No murmurs, rubs, or gallops;   ABDOMEN: Soft, Nontender, Nondistended; Bowel sounds present  EXTREMITIES:  + Peripheral Pulses, No clubbing, cyanosis, or edema  NERVOUS SYSTEM: arousal to voice   LYMPH: No lymphadenopathy noted  SKIN: No rashes or lesions; good turgor, warm, dry      LABS:  CBC Full  -  ( 11 Sep 2018 06:28 )  WBC Count : 11.9 K/uL  Hemoglobin : 11.1 g/dL  Hematocrit : 34.0 %  Platelet Count - Automated : 214 K/uL  Mean Cell Volume : 86.9 fl  Mean Cell Hemoglobin : 28.3 pg  Mean Cell Hemoglobin Concentration : 32.6 gm/dL  Auto Neutrophil # : x  Auto Lymphocyte # : x  Auto Monocyte # : x  Auto Eosinophil # : x  Auto Basophil # : x  Auto Neutrophil % : x  Auto Lymphocyte % : x  Auto Monocyte % : x  Auto Eosinophil % : x  Auto Basophil % : x    09-11    137  |  108  |  6<L>  ----------------------------<  202<H>  4.0   |  20<L>  |  0.56    Ca    7.5<L>      11 Sep 2018 06:28  Phos  1.9     09-11  Mg     1.9     09-11    TPro  5.9<L>  /  Alb  2.2<L>  /  TBili  0.4  /  DBili  x   /  AST  14  /  ALT  22  /  AlkPhos  57  09-10        Culture Results:   No growth to date. (09-10 @ 09:54)  Culture Results:   No growth to date. (09-09 @ 11:37)      RADIOLOGY & ADDITIONAL STUDIES REVIEWED     CXR:< from: Xray Chest 1 View- PORTABLE-Urgent (09.11.18 @ 13:53) >  INTERPRETATION:  CLINICAL STATEMENT: Follow-up chest pain.    TECHNIQUE: AP view of the chest.    COMPARISON: 9/10/2018    FINDINGS/  IMPRESSION:  Right central line, feeding tube again noted. No pneumothorax.    Increased interstitial lung markings without significant change. New   opacity right lung. There are aeration left lung.    Probable small bilateral pleural effusions.    Heart size cannot be accurately assessed in this projection.      < end of copied text >      IMPRESSION:  PAST MEDICAL & SURGICAL HISTORY:  Cardiac disease  DM (diabetes mellitus)  HTN (hypertension)  H/O cervical spine surgery   p/w   IMP: This is a 57 yr old woman with prior mentioned medical found unresponsive due to hypoglycemia in rehab center. According to NH staff .. seems like pat took her own meds and also was given meds NH staff.  She was intubated for airway protection. Mental  status improved. CXR demonstrated possible new b/l infiltrate due to asp pna . Pat is sedated for vent synchrony.  Neuro eval noted .. eeg/brain mri        Plan:        CNS: d/c all sedation to access mental status and for weaning, EEG, brain mri after extubation    PULMONARY: for weaning trial after off sedation    CARDIAC: continue to monitor    GI: ngt    RENAL: monitor    SKIN: no issue    MUSCULOSKELETAL: Pt     ID: continue antibx and f/u cultures    HEME: no issue    ENDO: monitor fs with coverage    DVT and GI Prophylaxis    GOALS OF CARE DISCUSSION WITH PATIENT/FAMILY/PROXY    CRITICAL CARE TIME SPENT: 35 minutes

## 2018-09-12 LAB
ALBUMIN SERPL ELPH-MCNC: 2.4 G/DL — LOW (ref 3.5–5)
ALP SERPL-CCNC: 61 U/L — SIGNIFICANT CHANGE UP (ref 40–120)
ALT FLD-CCNC: 20 U/L DA — SIGNIFICANT CHANGE UP (ref 10–60)
ANION GAP SERPL CALC-SCNC: 10 MMOL/L — SIGNIFICANT CHANGE UP (ref 5–17)
AST SERPL-CCNC: 10 U/L — SIGNIFICANT CHANGE UP (ref 10–40)
BASOPHILS # BLD AUTO: 0.1 K/UL — SIGNIFICANT CHANGE UP (ref 0–0.2)
BASOPHILS NFR BLD AUTO: 0.7 % — SIGNIFICANT CHANGE UP (ref 0–2)
BILIRUB SERPL-MCNC: 0.6 MG/DL — SIGNIFICANT CHANGE UP (ref 0.2–1.2)
BUN SERPL-MCNC: 6 MG/DL — LOW (ref 7–18)
CALCIUM SERPL-MCNC: 8.5 MG/DL — SIGNIFICANT CHANGE UP (ref 8.4–10.5)
CHLORIDE SERPL-SCNC: 104 MMOL/L — SIGNIFICANT CHANGE UP (ref 96–108)
CK MB BLD-MCNC: <1 % — SIGNIFICANT CHANGE UP (ref 0–3.5)
CK MB CFR SERPL CALC: <1 NG/ML — SIGNIFICANT CHANGE UP (ref 0–3.6)
CK SERPL-CCNC: 96 U/L — SIGNIFICANT CHANGE UP (ref 21–215)
CO2 SERPL-SCNC: 26 MMOL/L — SIGNIFICANT CHANGE UP (ref 22–31)
CREAT SERPL-MCNC: 0.51 MG/DL — SIGNIFICANT CHANGE UP (ref 0.5–1.3)
CULTURE RESULTS: SIGNIFICANT CHANGE UP
EOSINOPHIL # BLD AUTO: 0.5 K/UL — SIGNIFICANT CHANGE UP (ref 0–0.5)
EOSINOPHIL NFR BLD AUTO: 4.2 % — SIGNIFICANT CHANGE UP (ref 0–6)
GLUCOSE BLDC GLUCOMTR-MCNC: 179 MG/DL — HIGH (ref 70–99)
GLUCOSE BLDC GLUCOMTR-MCNC: 187 MG/DL — HIGH (ref 70–99)
GLUCOSE BLDC GLUCOMTR-MCNC: 214 MG/DL — HIGH (ref 70–99)
GLUCOSE BLDC GLUCOMTR-MCNC: 255 MG/DL — HIGH (ref 70–99)
GLUCOSE SERPL-MCNC: 173 MG/DL — HIGH (ref 70–99)
HCT VFR BLD CALC: 36.9 % — SIGNIFICANT CHANGE UP (ref 34.5–45)
HGB BLD-MCNC: 12.1 G/DL — SIGNIFICANT CHANGE UP (ref 11.5–15.5)
LYMPHOCYTES # BLD AUTO: 19.4 % — SIGNIFICANT CHANGE UP (ref 13–44)
LYMPHOCYTES # BLD AUTO: 2.2 K/UL — SIGNIFICANT CHANGE UP (ref 1–3.3)
MAGNESIUM SERPL-MCNC: 1.8 MG/DL — SIGNIFICANT CHANGE UP (ref 1.6–2.6)
MCHC RBC-ENTMCNC: 28.1 PG — SIGNIFICANT CHANGE UP (ref 27–34)
MCHC RBC-ENTMCNC: 32.7 GM/DL — SIGNIFICANT CHANGE UP (ref 32–36)
MCV RBC AUTO: 85.8 FL — SIGNIFICANT CHANGE UP (ref 80–100)
MONOCYTES # BLD AUTO: 0.7 K/UL — SIGNIFICANT CHANGE UP (ref 0–0.9)
MONOCYTES NFR BLD AUTO: 6.2 % — SIGNIFICANT CHANGE UP (ref 2–14)
NEUTROPHILS # BLD AUTO: 7.9 K/UL — HIGH (ref 1.8–7.4)
NEUTROPHILS NFR BLD AUTO: 69.6 % — SIGNIFICANT CHANGE UP (ref 43–77)
PHOSPHATE SERPL-MCNC: 2 MG/DL — LOW (ref 2.5–4.5)
PLATELET # BLD AUTO: 242 K/UL — SIGNIFICANT CHANGE UP (ref 150–400)
POTASSIUM SERPL-MCNC: 3.5 MMOL/L — SIGNIFICANT CHANGE UP (ref 3.5–5.3)
POTASSIUM SERPL-SCNC: 3.5 MMOL/L — SIGNIFICANT CHANGE UP (ref 3.5–5.3)
PROINSULIN SERPL-MCNC: 1.3 PMOL/L — SIGNIFICANT CHANGE UP (ref 0–10)
PROT SERPL-MCNC: 6.8 G/DL — SIGNIFICANT CHANGE UP (ref 6–8.3)
RBC # BLD: 4.3 M/UL — SIGNIFICANT CHANGE UP (ref 3.8–5.2)
RBC # FLD: 12.5 % — SIGNIFICANT CHANGE UP (ref 10.3–14.5)
SODIUM SERPL-SCNC: 140 MMOL/L — SIGNIFICANT CHANGE UP (ref 135–145)
SPECIMEN SOURCE: SIGNIFICANT CHANGE UP
TROPONIN I SERPL-MCNC: 0.15 NG/ML — HIGH (ref 0–0.04)
WBC # BLD: 11.4 K/UL — HIGH (ref 3.8–10.5)
WBC # FLD AUTO: 11.4 K/UL — HIGH (ref 3.8–10.5)

## 2018-09-12 PROCEDURE — 71045 X-RAY EXAM CHEST 1 VIEW: CPT | Mod: 26

## 2018-09-12 PROCEDURE — 95819 EEG AWAKE AND ASLEEP: CPT | Mod: 26

## 2018-09-12 RX ORDER — CARVEDILOL PHOSPHATE 80 MG/1
25 CAPSULE, EXTENDED RELEASE ORAL EVERY 12 HOURS
Qty: 0 | Refills: 0 | Status: DISCONTINUED | OUTPATIENT
Start: 2018-09-12 | End: 2018-09-13

## 2018-09-12 RX ORDER — INSULIN LISPRO 100/ML
VIAL (ML) SUBCUTANEOUS
Qty: 0 | Refills: 0 | Status: DISCONTINUED | OUTPATIENT
Start: 2018-09-12 | End: 2018-09-20

## 2018-09-12 RX ADMIN — CEFEPIME 100 MILLIGRAM(S): 1 INJECTION, POWDER, FOR SOLUTION INTRAMUSCULAR; INTRAVENOUS at 14:00

## 2018-09-12 RX ADMIN — LACTULOSE 10 GRAM(S): 10 SOLUTION ORAL at 05:40

## 2018-09-12 RX ADMIN — CEFEPIME 100 MILLIGRAM(S): 1 INJECTION, POWDER, FOR SOLUTION INTRAMUSCULAR; INTRAVENOUS at 05:39

## 2018-09-12 RX ADMIN — Medication 4: at 15:54

## 2018-09-12 RX ADMIN — Medication 2: at 05:39

## 2018-09-12 RX ADMIN — Medication 6: at 23:16

## 2018-09-12 RX ADMIN — PANTOPRAZOLE SODIUM 40 MILLIGRAM(S): 20 TABLET, DELAYED RELEASE ORAL at 18:00

## 2018-09-12 RX ADMIN — Medication 12.5 MILLIGRAM(S): at 05:40

## 2018-09-12 RX ADMIN — INSULIN GLARGINE 10 UNIT(S): 100 INJECTION, SOLUTION SUBCUTANEOUS at 23:16

## 2018-09-12 RX ADMIN — Medication 1 DROP(S): at 18:00

## 2018-09-12 RX ADMIN — PANTOPRAZOLE SODIUM 40 MILLIGRAM(S): 20 TABLET, DELAYED RELEASE ORAL at 05:40

## 2018-09-12 RX ADMIN — LACTULOSE 10 GRAM(S): 10 SOLUTION ORAL at 17:19

## 2018-09-12 RX ADMIN — SIMVASTATIN 20 MILLIGRAM(S): 20 TABLET, FILM COATED ORAL at 23:17

## 2018-09-12 RX ADMIN — CEFEPIME 100 MILLIGRAM(S): 1 INJECTION, POWDER, FOR SOLUTION INTRAMUSCULAR; INTRAVENOUS at 23:16

## 2018-09-12 RX ADMIN — Medication 2: at 11:53

## 2018-09-12 RX ADMIN — Medication 2: at 00:07

## 2018-09-12 RX ADMIN — Medication 1 DROP(S): at 05:39

## 2018-09-12 RX ADMIN — Medication 300 MILLIGRAM(S): at 12:41

## 2018-09-12 RX ADMIN — CARVEDILOL PHOSPHATE 25 MILLIGRAM(S): 80 CAPSULE, EXTENDED RELEASE ORAL at 17:19

## 2018-09-12 NOTE — PROGRESS NOTE ADULT - SUBJECTIVE AND OBJECTIVE BOX
Patient is seen and examined at the bed side, is afebrile. She is s/p Extubated today, saturating well on nasal canula.  The Leukocytosis is trending down.      REVIEW OF SYSTEMS: Unable to obtain since intubated and not responsive      ALLERGIES: NKDA      ICU Vital Signs Last 24 Hrs  T(C): 37.1 (12 Sep 2018 16:00), Max: 37.3 (12 Sep 2018 12:00)  T(F): 98.8 (12 Sep 2018 16:00), Max: 99.1 (12 Sep 2018 12:00)  HR: 122 (12 Sep 2018 17:00) (100 - 124)  BP: 140/82 (12 Sep 2018 17:00) (125/77 - 148/82)  BP(mean): 96 (12 Sep 2018 17:00) (87 - 125)  ABP: --  ABP(mean): --  RR: 25 (12 Sep 2018 17:00) (10 - 29)  SpO2: 99% (12 Sep 2018 17:00) (96% - 100%)        PHYSICAL EXAM:  GENERAL: Not in distress  CVS: s1 and s2 present  RESP: Air entry B/L  GI: Abdomen non distended and Nontender  EXT: No pedal edema   CNS: Awake and Alert        LABS:                        12.1   11.4  )-----------( 242      ( 12 Sep 2018 06:12 )             36.9                                 11.6   15.0  )-----------( 227      ( 10 Sep 2018 06:13 )             35.6           09-12    140  |  104  |  6<L>  ----------------------------<  173<H>  3.5   |  26  |  0.51    Ca    8.5      12 Sep 2018 06:12  Phos  2.0     09-12  Mg     1.8     -12    TPro  6.8  /  Alb  2.4<L>  /  TBili  0.6  /  DBili  x   /  AST  10  /  ALT  20  /  AlkPhos  61  09-12    09-11    137  |  108  |  6<L>  ----------------------------<  202<H>  4.0   |  20<L>  |  0.56    Ca    7.5<L>      11 Sep 2018 06:28  Phos  1.9       Mg     1.9         TPro  5.9<L>  /  Alb  2.2<L>  /  TBili  0.4  /  DBili  x   /  AST  14  /  ALT  22  /  AlkPhos  57  09-10        Color: Yellow / Appearance: Clear / S.010 / pH: x  Gluc: x / Ketone: Trace  / Bili: Negative / Urobili: Negative   Blood: x / Protein: 100 / Nitrite: Negative   Leuk Esterase: Trace / RBC: 0-2 /HPF / WBC 0-2 /HPF   Sq Epi: x / Non Sq Epi: Few /HPF / Bacteria: Negative /HPF      CAPILLARY BLOOD GLUCOSE      POCT Blood Glucose.: 166 mg/dL (09 Sep 2018 17:59)  POCT Blood Glucose.: 257 mg/dL (09 Sep 2018 13:45)  POCT Blood Glucose.: 301 mg/dL (09 Sep 2018 12:04)  POCT Blood Glucose.: 289 mg/dL (09 Sep 2018 06:24)  POCT Blood Glucose.: 198 mg/dL (08 Sep 2018 23:46)  POCT Blood Glucose.: 168 mg/dL (08 Sep 2018 22:29)  POCT Blood Glucose.: 163 mg/dL (08 Sep 2018 20:12)  POCT Blood Glucose.: 142 mg/dL (08 Sep 2018 18:59)    ABG - ( 09 Sep 2018 04:43 )  pH, Arterial: 7.29  pH, Blood: x     /  pCO2: 40    /  pO2: 108   / HCO3: 19    / Base Excess: -6.7  /  SaO2: 98            MEDICATIONS  (STANDING):  artificial  tears Solution 1 Drop(s) Both EYES two times a day  carvedilol 25 milliGRAM(s) Oral every 12 hours  cefepime   IVPB      cefepime   IVPB 2000 milliGRAM(s) IV Intermittent every 8 hours  ferrous    sulfate Liquid 300 milliGRAM(s) Oral daily  insulin glargine Injectable (LANTUS) 10 Unit(s) SubCutaneous at bedtime  insulin lispro (HumaLOG) corrective regimen sliding scale   SubCutaneous Before meals and at bedtime  lactulose Syrup 10 Gram(s) Oral two times a day  pantoprazole  Injectable 40 milliGRAM(s) IV Push two times a day  simvastatin 20 milliGRAM(s) Oral at bedtime    MEDICATIONS  (PRN):  acetaminophen    Suspension .. 650 milliGRAM(s) Oral every 6 hours PRN Temp greater or equal to 38C (100.4F), Mild Pain (1 - 3)          RADIOLOGY & ADDITIONAL TESTS:    9/10/18: Xray Chest 1 View- PORTABLE-Routine (09.10.18 @ 06:59) ET and enteric tubes, and right IJ catheter remain in place. No pneumothorax. Small layering left pleural effusion has increased. Small right pleural effusion is unchanged. Mild pulmonary vascular congestion and scattered  lung opacities are similar to the prior study.    18 : Xray Chest 1 View- PORTABLE-Routine (18 @ 10:01) Mild pulmonary venous congestion, increased since the prior study. No pleural effusions . Hazy opacities in the lower lobes could   represent atelectasis or infiltrates.      18 : CT Abdomen and Pelvis No Cont (18 @ 03:24) No retroperitoneal hemorrhage. No bowel obstruction. Extensive bilateral lower lobe consolidation. Differential diagnosis   includes multifocal pneumonia, aspiration pneumonia, alveolar hemorrhage. Mild pulmonary venous congestion.      18 : CT Head No Cont (18 @ 07:55) There is no evidence of hydrocephalus, mass effect or any sizable extra-axial collection. Due to motion artifact, subtle abnormalities       MICROBIOLOGY DATA:    MRSA/MSSA PCR (09.10.18 @ 10:01)    MRSA PCR Result.: NotDetec: MRSA/MSSA PCR assay is a qualitative in vitro diagnostic test for the  direct detection and differentiation of methicillin-resistant  Staphylococcus aureus (MRSA) from Staphylococcus aureus (SA). The assay  detects DNA from nasal swabs in patients atrisk for nasal colonization.  It is not intended to diagnose MRSA or SA infections nor guide or monitor  treatment for MRSA/SA infections. A negative result does not preclude  nasal colonization. The assay is FDA-approved and its performance has  been established by [employer], [place] and the [facility], [place], [place].    Staph Aureus PCR Result: Detected    Culture - Sputum . (09.10.18 @ 09:54)    Gram Stain:   No polymorphonuclear leukocytes per low power field  No Squamous epithelial cells per low power field  No organisms seen per oil power field    Specimen Source: .Sputum Sputum, trap    Culture Results:   No growth to date.    Rapid Respiratory Viral Panel (18 @ 18:24)    Rapid RVP Result: NotDetec: The [employer] RVP Rapid uses polymerase chain reaction (PCR) and melt  curve analysis to screen for adenovirus; coronavirus HKU1, NL63, 229E,  OC43; human metapneumovirus (hMPV); human enterovirus/rhinovirus  (Entero/RV); influenza A; influenza A/H1;influenza A/H3; influenza  A/H1-2009; influenza B; parainfluenza viruses 1, 2, 3, 4; respiratory  syncytial virus; Bordetella pertussis; Mycoplasma pneumoniae; and  Chlamydophila pneumoniae.      Culture - Blood (18 @ 11:37)    Specimen Source: .Blood Blood-Peripheral    Culture Results:   No growth to date.

## 2018-09-12 NOTE — SWALLOW BEDSIDE ASSESSMENT ADULT - SLP PERTINENT HISTORY OF CURRENT PROBLEM
58 y/o female from [facility] rehab with PMH of HTN, DM and ?cardiac disease, and PSH of recent C spine surgery (1 month back at [place] for ?nerve compression) sent to ED for unresponsiveness. Likely secondary to hypoglycemia. Was intubated in ED on 9/8, and extubated on 9/11.

## 2018-09-12 NOTE — PROGRESS NOTE ADULT - SUBJECTIVE AND OBJECTIVE BOX
Interval Events:  pt extubated  in NAD    Allergies    No Known Allergies    Intolerances      Endocrine/Metabolic Medications:  dextrose 50% Injectable 25 Gram(s) IV Push once  insulin glargine Injectable (LANTUS) 10 Unit(s) SubCutaneous at bedtime  insulin lispro (HumaLOG) corrective regimen sliding scale   SubCutaneous every 6 hours  simvastatin 20 milliGRAM(s) Oral at bedtime      Vital Signs Last 24 Hrs  T(C): 36.6 (12 Sep 2018 08:00), Max: 37.6 (11 Sep 2018 15:59)  T(F): 97.8 (12 Sep 2018 08:00), Max: 99.6 (11 Sep 2018 15:59)  HR: 113 (12 Sep 2018 10:00) (97 - 133)  BP: 134/81 (12 Sep 2018 10:00) (114/73 - 157/95)  BP(mean): 94 (12 Sep 2018 10:00) (82 - 125)  RR: 15 (12 Sep 2018 10:00) (10 - 22)  SpO2: 99% (12 Sep 2018 10:00) (95% - 100%)      PHYSICAL EXAM  All physical exam findings normal, except those marked:  General:	Alert, active, cooperative, NAD, well hydrated  .		[] Abnormal:  Neck		Normal: supple, no cervical adenopathy, no palpable thyroid  .		[] Abnormal:  Cardiovascular	Normal: regular rate, normal S1, S2, no murmurs  .		[] Abnormal:  Respiratory	Normal: no chest wall deformity, normal respiratory pattern, CTA B/L  .		[] Abnormal:  Abdominal	Normal: soft, ND, NT, bowel sounds present, no masses, no organomegaly  .		[] Abnormal:  		Normal normal genitalia, testes descended, circumcised/uncircumcised  .		Addison stage:			Breast addison:  .		Menstrual history:  .		[] Abnormal:  Extremities	Normal: FROM x4  .		[] Abnormal:  Skin		Normal: intact and not indurated, no rash, no acanthosis nigricans  .		[] Abnormal:  Neurologic	Normal: grossly intact  .		[] Abnormal:    LABS                        12.1   11.4  )-----------( 242      ( 12 Sep 2018 06:12 )             36.9                               140    |  104    |  6                   Calcium: 8.5   / iCa: x      (09-12 @ 06:12)    ----------------------------<  173       Magnesium: 1.8                              3.5     |  26     |  0.51             Phosphorous: 2.0      TPro  6.8    /  Alb  2.4    /  TBili  0.6    /  DBili  x      /  AST  10     /  ALT  20     /  AlkPhos  61     12 Sep 2018 06:12    CAPILLARY BLOOD GLUCOSE      POCT Blood Glucose.: 187 mg/dL (12 Sep 2018 05:34)  POCT Blood Glucose.: 164 mg/dL (11 Sep 2018 23:55)  POCT Blood Glucose.: 201 mg/dL (11 Sep 2018 17:42)  POCT Blood Glucose.: 222 mg/dL (11 Sep 2018 12:29)        Assesment/plan    Dm- s/p hypoglycemia-   cont lantus 10 units  consider low dose premeal insulin once po intake resumes  fsg ac and hs  neuro eval noted  MRI and eeg per neuro

## 2018-09-12 NOTE — SWALLOW BEDSIDE ASSESSMENT ADULT - SWALLOW EVAL: RECOMMENDED FEEDING/EATING TECHNIQUES
alternate food with liquid/maintain upright posture during/after eating for 30 mins/oral hygiene/position upright (90 degrees)/small sips/bites/allow for swallow between intakes

## 2018-09-12 NOTE — PROGRESS NOTE ADULT - PROBLEM SELECTOR PLAN 5
-Home meds includes Basaglar 38 units at bedtime, Humalog 5 units pre meals and correctional scale   -on HSS and lantus 10 units. will add Humalog pre meal   -Started Lantus 10U HS  accu checks AC and HS  -Hba1c is 7.6  endocrine Dr evangelista following **

## 2018-09-12 NOTE — PROGRESS NOTE ADULT - PROBLEM SELECTOR PLAN 4
-most likely secondary to hypoglycemia as patient was noted to be hypoglycemic to 40s last night at rehab, also received glucagon in ED.  -CT head negative for any acute event  -patient had episode of Sinus tachycardia on 9/11-> s/p metoprolol 5 IV and started carevdilol 25 BID home dose on 9/12  -CXR clear, lactate 0.6, UA is negative, s/p 1 dose of Zosyn in ED  -Blood alcohol <3, serum acetaminophen and salicylate level wnl.   -Utox positive for benzodiazepines (takes valium 5 mg q12)  -Could be CVA v/s seizure, metabolic encephalopathy will consider MRI when stable  -patient is now Extubated and passed bed side swallow eval.

## 2018-09-12 NOTE — SWALLOW BEDSIDE ASSESSMENT ADULT - COMMENTS
Pt A,A+Ox2-3 (name, hospital, date- not month or year), HOB elevated to 90°. Exam given in Mandarin by SLP. Notably, Pt became emotionally upset when asked the date, but gave no reason as to why upon questioning. Pt was calm when exam resumed. no cough /p swallow; no c/o pain or globus sensation.

## 2018-09-12 NOTE — PROGRESS NOTE ADULT - SUBJECTIVE AND OBJECTIVE BOX
INTERVAL HPI/OVERNIGHT EVENTS:       Antimicrobial:  cefepime   IVPB      cefepime   IVPB 2000 milliGRAM(s) IV Intermittent every 8 hours    Cardiovascular:  carvedilol 25 milliGRAM(s) Oral every 12 hours    Pulmonary:    Hematalogic:    Other:  acetaminophen    Suspension .. 650 milliGRAM(s) Oral every 6 hours PRN  artificial  tears Solution 1 Drop(s) Both EYES two times a day  ferrous    sulfate Liquid 300 milliGRAM(s) Oral daily  insulin glargine Injectable (LANTUS) 10 Unit(s) SubCutaneous at bedtime  insulin lispro (HumaLOG) corrective regimen sliding scale   SubCutaneous Before meals and at bedtime  lactulose Syrup 10 Gram(s) Oral two times a day  pantoprazole  Injectable 40 milliGRAM(s) IV Push two times a day  simvastatin 20 milliGRAM(s) Oral at bedtime      Drug Dosing Weight    Weight (kg): 66.6 (08 Sep 2018 11:22)    CENTRAL LINE: [ ] YES [ ] NO  LOCATION:   DATE INSERTED:    JENSEN: [ ] YES [ ] NO    DATE INSERTED:    A-LINE:  [ ] YES [ ] NO  LOCATION:   DATE INSERTED:    PMH/Social Hx/Fam Hx -reviewed admission note, no change since admission  PAST MEDICAL & SURGICAL HISTORY:  Cardiac disease  DM (diabetes mellitus)  HTN (hypertension)  H/O cervical spine surgery      T(C): 37.1 (09-12-18 @ 16:00), Max: 37.3 (09-12-18 @ 12:00)  HR: 119 (09-12-18 @ 19:00)  BP: 136/87 (09-12-18 @ 19:00)  BP(mean): 99 (09-12-18 @ 19:00)  ABP: --  ABP(mean): --  RR: 24 (09-12-18 @ 19:00)  SpO2: 99% (09-12-18 @ 19:00)  Wt(kg): --          09-11 @ 07:01  -  09-12 @ 07:00  --------------------------------------------------------  IN: 419.8 mL / OUT: 4620 mL / NET: -4200.2 mL            PHYSICAL EXAM:    GENERAL: No signs of distress, comfortable  HEAD: Atraumatic, Normocephalic  EYES: EOMI, PERRLA  ENMT: No erythema, exudates, or enlargement, Moist mucous membranes  NECK: Supple, normal appearance, No JVD; [  ] central line (if applicable)  CHEST/LUNG: No chest deformity, fair bilateral air entry; No rales, rhonchi, wheezing; crackles  HEART: Regular rate and rhythm; No murmurs, rubs, or gallops;   ABDOMEN: Soft, Nontender, Nondistended; Bowel sounds present  EXTREMITIES:  + Peripheral Pulses, No clubbing, cyanosis, or edema  NERVOUS SYSTEM: awake and alert   LYMPH: No lymphadenopathy noted  SKIN: No rashes or lesions; good turgor, warm, dry      LABS:  CBC Full  -  ( 12 Sep 2018 06:12 )  WBC Count : 11.4 K/uL  Hemoglobin : 12.1 g/dL  Hematocrit : 36.9 %  Platelet Count - Automated : 242 K/uL  Mean Cell Volume : 85.8 fl  Mean Cell Hemoglobin : 28.1 pg  Mean Cell Hemoglobin Concentration : 32.7 gm/dL  Auto Neutrophil # : 7.9 K/uL  Auto Lymphocyte # : 2.2 K/uL  Auto Monocyte # : 0.7 K/uL  Auto Eosinophil # : 0.5 K/uL  Auto Basophil # : 0.1 K/uL  Auto Neutrophil % : 69.6 %  Auto Lymphocyte % : 19.4 %  Auto Monocyte % : 6.2 %  Auto Eosinophil % : 4.2 %  Auto Basophil % : 0.7 %    09-12    140  |  104  |  6<L>  ----------------------------<  173<H>  3.5   |  26  |  0.51    Ca    8.5      12 Sep 2018 06:12  Phos  2.0     09-12  Mg     1.8     09-12    TPro  6.8  /  Alb  2.4<L>  /  TBili  0.6  /  DBili  x   /  AST  10  /  ALT  20  /  AlkPhos  61  09-12        Culture Results:   No growth at 48 hours (09-10 @ 09:54)      RADIOLOGY & ADDITIONAL STUDIES REVIEWED         IMPRESSION:  PAST MEDICAL & SURGICAL HISTORY:  Cardiac disease  DM (diabetes mellitus)  HTN (hypertension)  H/O cervical spine surgery   p/w       IMP: This is a 57 yr old woman with prior mentioned medical found unresponsive due to hypoglycemia in rehab center. According to NH staff .. seems like pat took her own meds and also was given meds NH staff.  She was intubated for airway protection. Mental  status improved. CXR demonstrated possible new b/l infiltrate due to asp pna . Pat is sedated for vent synchrony.  Neuro eval noted .. eeg/brain mri. ID f/u noted , antibx changed. Pulmo and cards noted. neuro f/u noted      Plan:            CNS:no sedation, f/u EGG    PULMONARY: no issue    CARDIAC: no issue    GI: feed    RENAL: d/c jensen    SKIN: no issue    MUSCULOSKELETAL: PT, OOB to chair    ID: antibx changed as per ID     HEME: no issue    ENDO: monitor FS and endo f/u    DVT and GI Prophylaxis    GOALS OF CARE DISCUSSION WITH PATIENT/FAMILY/PROXY/icu team    CRITICAL CARE TIME SPENT: 35 minutes

## 2018-09-12 NOTE — SWALLOW BEDSIDE ASSESSMENT ADULT - ORAL PREPARATORY PHASE
Reduced oral grading Reduced oral grading/Decreased mastication ability/Increased mastication time Within functional limits

## 2018-09-12 NOTE — PROGRESS NOTE ADULT - ASSESSMENT
A 56 yo female who was sent in to the ER from [facility] for evaluation of hypoglycemia and unresponsiveness. As per EMS, patient was thought to have low blood sugars so was treated for that without improvement in mental status. Hence, intubated by ED attending and admitted to ICU for further management. She found to have Leukocytosis, fever and B/L extensive Lower lobe infiltrate. She has started on Ceftriaxone and Doxycyline and The ID consult requested to assist with further evaluation and antibiotic management.    # Septic shock- on pressor  # B/L Lower lobe Pneumonia - MRSA/MSSA PCR is negative    Would recommend:    1. Continue Cefepime  inpatient , May change to oral Abx on discharge to complete the course  2. Aspiration precaution  3. Continue supplemental oxygenation and bronchodilator as needed    d/w ICU team    will follow the patient with you A 56 yo female who was [facility] evaluation of hypoglycemia and unresponsiveness. As per EMS, patient was thought to have low blood sugars so was treated for that without improvement in mental status. Hence, intubated by ED attending and admitted to ICU for further management. She found to have Leukocytosis, fever and B/L extensive Lower lobe infiltrate. She has started on Ceftriaxone and Doxycyline and The ID consult requested to assist with further evaluation and antibiotic management.    # Septic shock- on pressor  # B/L Lower lobe Pneumonia - MRSA/MSSA PCR is negative    Would recommend:    1. Continue Cefepime  inpatient , May change to oral Abx on discharge to complete the course  2. Aspiration precaution  3. Continue supplemental oxygenation and bronchodilator as needed  4. OOb to chair/PT    d/w ICU team and Family at the bed side    will follow the patient with you

## 2018-09-12 NOTE — SWALLOW BEDSIDE ASSESSMENT ADULT - ORAL PHASE
impaired bolus formation/Decreased anterior-posterior movement of the bolus/Delayed oral transit time mild holding prior to swallow/Decreased anterior-posterior movement of the bolus/Delayed oral transit time Within functional limits

## 2018-09-12 NOTE — PROGRESS NOTE ADULT - ASSESSMENT
Ac Respiratory Failure sec to encephalopathy ? drug induced like Valium, percocet s/p hypoglycemia -- resolved  Hcvd with CHF  DM  Spine surgery      Plan;  ACEI, Lasix, Neutraphos  s/C Lovenox  FS coverage  Hold strong analgesics and sedatives  OOB / BRP  D/c jensen  P/T Ac Respiratory Failure sec to encephalopathy ? drug induced like Valium, percocet s/p hypoglycemia -- resolved  Hcvd with CHF/ cardiomyopathy  DM  Spine surgery      Plan;  ACEI, Lasix, Neutraphos  s/C Lovenox  FS coverage  Hold strong analgesics and sedatives  OOB / BRP  D/c jensen  P/T Ac Respiratory Failure sec to encephalopathy ? drug induced like Valium, percocet s/p hypoglycemia -- resolved  Hcvd with CHF/ cardiomyopathy  DM  Spine surgery      Plan;  Lisinopril, coreg, Lasix and   Neutraphos  s/C Lovenox  FS coverage  Hold strong analgesics and sedatives  OOB / BRP  D/c jensen  P/T

## 2018-09-12 NOTE — PROGRESS NOTE ADULT - PROBLEM SELECTOR PLAN 1
secondary to PNA  Hypoxic respiratory failure   afebrile in 24 hours   -blood culture NTD  CT abd: B/l lower lobe consolidation   c/w cefepime   MSSA +ve PCR MRSA -ve

## 2018-09-12 NOTE — PROGRESS NOTE ADULT - SUBJECTIVE AND OBJECTIVE BOX
JACQUELIN LINDER  MRN-[id number]    Patient is a 57y old  Female who presents with a chief complaint of unresponsiveness (08 Sep 2018 13:02)  patient seen and examined      s off vent this am   more awake .    MEDICATIONS  (STANDING):  artificial  tears Solution 1 Drop(s) Both EYES two times a day  carvedilol 25 milliGRAM(s) Oral every 12 hours  cefepime   IVPB      cefepime   IVPB 2000 milliGRAM(s) IV Intermittent every 8 hours  ferrous    sulfate Liquid 300 milliGRAM(s) Oral daily  insulin glargine Injectable (LANTUS) 10 Unit(s) SubCutaneous at bedtime  insulin lispro (HumaLOG) corrective regimen sliding scale   SubCutaneous Before meals and at bedtime  lactulose Syrup 10 Gram(s) Oral two times a day  pantoprazole  Injectable 40 milliGRAM(s) IV Push two times a day  simvastatin 20 milliGRAM(s) Oral at bedtime    MEDICATIONS  (PRN):  acetaminophen    Suspension .. 650 milliGRAM(s) Oral every 6 hours PRN Temp greater or equal to 38C (100.4F), Mild Pain (1 - 3)      Allergies    No Known Allergies    Intolerances        PAST MEDICAL & SURGICAL HISTORY:  Cardiac disease  DM (diabetes mellitus)  HTN (hypertension)  H/O cervical spine surgery      FAMILY HISTORY:  Family history unknown      SOCIAL HISTORY  Smoking History:     REVIEW OF SYSTEMS:    CONSTITUTIONAL:  Fevers [ ]  Yes  [x  ]  No                                   chills [  ]  Yes  [X  ]  No                               sweats  [  ]  Yes  [ X ]  No                                fatigue [x]  Yes  [ ]  No    HEENT:  Eyes:  Diplopia or blurred vision [  ]  Yes  [  ]  No    ENT:                        earache  [  ]  Yes  [  ]  No                             sore throat  [  ]  Yes  [  ]  No                            runny nose.  [  ]  Yes  [  ]  No    CARDIOVASCULAR:       chest pain  [  ]  Yes  [  ]  No                        squeezing, tightness,  [  ]  Yes  [  ]  No                                      palpitations.  [  ]  Yes  [  ]  No    RESPIRATORY:             Cough [  x]  Yes  [  ]  No                                  Wheezing [  ]  Yes  [  ]  No                                Hemoptysis [  ]  Yes  [  ]  No                                      Sputum [  ]  Yes  [  ]  No                   Shortness of Breathe [  ]  Yes  [  ]  No    GASTROINTESTINAL:      Abdominal pain  [  ]  Yes  [  ]  No                                                    Nausea  [  ]  Yes  [  ]  No                                                   Vomiting [  ]  Yes  [  ]  No                                              Constipation [  ]  Yes  [  ]  No                                                    Diarrhea [  ]  Yes  [  ]  No    GENITOURINARY:           Incontinence [  ]  Yes  [  ]  No                                                Dysuria [  ]  Yes  [  ]  No                                      Blood in Urine  [  ]  Yes  [  ]  No                          Frequency or urgency.  [  ]  Yes  [  ]  No    NEUROLOGIC:   lethargic  AT PRESENT                  Paresthesias  [  ]  Yes  [  ]  No                                             fasciculations  [  ]  Yes  [  ]  No                                seizures or weakness.  [  ]  Yes  [  ]  No                                                   Headache [  ]  Yes  [  ]  No                                  Loss of Conciousness [  ]  Yes  [  ]  No                                                     Insomnia [  ]  Yes  [  ]  No    PSYCHIATRIC:    more awake   Disorder of thought or mood.  [  ]  Yes  [  ]  No                                                           Anxiety [  ]  Yes  [  ]  No                                                      Depression [  ]  Yes  [  ]  No                                                         Dementia [  ]  Yes  [  ]  No      .ICU Vital Signs Last 24 Hrs  T(C): 37.3 (12 Sep 2018 12:00), Max: 37.6 (11 Sep 2018 15:59)  T(F): 99.1 (12 Sep 2018 12:00), Max: 99.6 (11 Sep 2018 15:59)  HR: 117 (12 Sep 2018 12:00) (100 - 133)  BP: 148/82 (12 Sep 2018 12:00) (125/77 - 157/95)  BP(mean): 96 (12 Sep 2018 12:00) (82 - 125)  ABP: --  ABP(mean): --  RR: 14 (12 Sep 2018 12:00) (10 - 22)  SpO2: 97% (12 Sep 2018 12:00) (95% - 100%)      PHYSICAL EXAMINATION:    GENERAL: The patient is a well-developed, well-nourished _____in no apparent distress.     HEENT:  n perrla    NECK: Supple. jvd [  ]  Yes  [  X]  No    LUNGS: Clear to auscultation  [  ]  Yes  [  x  No                               wheezing, [  ]  Yes  [X  ]  No                                      rales  [  ]  Yes  [  ]X  No                                    rhonchi  [ x ]  Yes  [  ]  No                 Respirations labored  [  ]  Yes  [  ]  No    HEART: Regular rate and rhythm  [ X ]  Yes  [  ]  No                                        Murmur [  ]  Yes  [  X]  No                                              rub  [  ]  Yes  [ X ]  No                                          gallop  [  ]  Yes  [  ]X  No    ABDOMEN:                                 Soft, X[  ]  Yes  [  ]  No                                             nontender [X  ]  Yes  [  ]  No                                             distended.[  ]  Yes  [X  ]  No                            hepatosplenomegaly ,[  ]  Yes  [ X ]  No                                                    BS   [  ]  Yes  [  ]  No    EXTREMITIES:                cyanosis, [  ]  Yes  [ X ]  No                                       clubbing,   [  ]  Yes  [  X]  No                                               rash, [  ]  Yes  [ X ]  No                                           edema.  [  ]  Yes  [ X ]  No    NEUROLOGIC:   lathergic    LABS:                        12.1   11.4  )-----------( 242      ( 12 Sep 2018 06:12 )             36.9     09-12    140  |  104  |  6<L>  ----------------------------<  173<H>  3.5   |  26  |  0.51    Ca    8.5      12 Sep 2018 06:12  Phos  2.0     09-12  Mg     1.8     -12    TPro  6.8  /  Alb  2.4<L>  /  TBili  0.6  /  DBili  x   /  AST  10  /  ALT  20  /  AlkPhos  61  09-12          CARDIAC MARKERS ( 12 Sep 2018 00:12 )  0.154 ng/mL / x     / 96 U/L / x     / <1.0 ng/mL  CARDIAC MARKERS ( 11 Sep 2018 16:16 )  0.206 ng/mL / x     / 102 U/L / x     / <1.0 ng/mL                  LABS:                        11.1   11.9  )-----------( 214      ( 11 Sep 2018 06:28 )             34.0     09-11    137  |  108  |  6<L>  ----------------------------<  202<H>  4.0   |  20<L>  |  0.56    Ca    7.5<L>      11 Sep 2018 06:28  Phos  1.9     -11  Mg     1.9         TPro  5.9<L>  /  Alb  2.2<L>  /  TBili  0.4  /  DBili  x   /  AST  14  /  ALT  22  /  AlkPhos  57  09-10        ABG - ( 10 Sep 2018 04:26 )  pH, Arterial: 7.35  pH, Blood: x     /  pCO2: 36    /  pO2: 119   / HCO3: 20    / Base Excess: -5.0  /  SaO2: 99                                LABS:                        11.6   15.0  )-----------( 227      ( 10 Sep 2018 06:13 )             35.6     09-10    140  |  111<H>  |  6<L>  ----------------------------<  142<H>  3.9   |  21<L>  |  0.58    Ca    7.5<L>      10 Sep 2018 06:13  Phos  1.1     -10  Mg     1.6     09-10    TPro  5.9<L>  /  Alb  2.2<L>  /  TBili  0.4  /  DBili  x   /  AST  14  /  ALT  22  /  AlkPhos  57  09-10    PT/INR - ( 09 Sep 2018 00:36 )   PT: 13.8 sec;   INR: 1.26 ratio           Urinalysis Basic - ( 08 Sep 2018 15:09 )    Color: Yellow / Appearance: Clear / S.010 / pH: x  Gluc: x / Ketone: Trace  / Bili: Negative / Urobili: Negative   Blood: x / Protein: 100 / Nitrite: Negative   Leuk Esterase: Trace / RBC: 0-2 /HPF / WBC 0-2 /HPF   Sq Epi: x / Non Sq Epi: Few /HPF / Bacteria: Negative /HPF      ABG - ( 10 Sep 2018 04:26 )  pH, Arterial: 7.35  pH, Blood: x     /  pCO2: 36    /  pO2: 119   / HCO3: 20    / Base Excess: -5.0  /  SaO2: 99                                LABS:                        11.4   13.4  )-----------( 219      ( 09 Sep 2018 09:38 )             33.7         141  |  112<H>  |  15  ----------------------------<  172<H>  3.4<L>   |  20<L>  |  0.74    Ca    6.7<L>      09 Sep 2018 00:36    TPro  8.3  /  Alb  3.7  /  TBili  <0.1<L>  /  DBili  x   /  AST  25  /  ALT  44  /  AlkPhos  85  -08    PT/INR - ( 09 Sep 2018 00:36 )   PT: 13.8 sec;   INR: 1.26 ratio         PTT - ( 08 Sep 2018 08:27 )  PTT:34.2 sec  Urinalysis Basic - ( 08 Sep 2018 15:09 )    Color: Yellow / Appearance: Clear / S.010 / pH: x  Gluc: x / Ketone: Trace  / Bili: Negative / Urobili: Negative   Blood: x / Protein: 100 / Nitrite: Negative   Leuk Esterase: Trace / RBC: 0-2 /HPF / WBC 0-2 /HPF   Sq Epi: x / Non Sq Epi: Few /HPF / Bacteria: Negative /HPF      ABG - ( 09 Sep 2018 04:43 )  pH, Arterial: 7.29  pH, Blood: x     /  pCO2: 40    /  pO2: 108   / HCO3: 19    / Base Excess: -6.7  /  SaO2: 98                CARDIAC MARKERS ( 08 Sep 2018 08:27 )  <0.015 ng/mL / x     / x     / x     / x        < from: Xray Chest 1 View- PORTABLE-Routine (18 @ 10:01) >  IMPRESSION:  Mild pulmonary venous congestion.  .    < from: CT Abdomen and Pelvis No Cont (18 @ 03:24) >  IMPRESSION:  No retroperitoneal hemorrhage. No bowel obstruction.    Extensive bilateral lower lobe consolidation. Differential diagnosis   includes multifocal pneumonia, aspiration pneumonia, alveolar hemorrhage.    Mild pulmonary venous congestion.        < end of copied text >      Lactate, Blood: 1.3 mmol/L (18 @ 00:36)          LABS:                        11.6   11.3  )-----------( 329      ( 08 Sep 2018 08:27 )             35.5         136  |  104  |  20<H>  ----------------------------<  151<H>  4.6   |  23  |  0.77    Ca    8.9      08 Sep 2018 08:27    TPro  8.3  /  Alb  3.7  /  TBili  <0.1<L>  /  DBili  x   /  AST  25  /  ALT  44  /  AlkPhos  85      PT/INR - ( 08 Sep 2018 08:27 )   PT: 10.1 sec;   INR: 0.93 ratio         PTT - ( 08 Sep 2018 08:27 )  PTT:34.2 sec  Urinalysis Basic - ( 08 Sep 2018 15:09 )    Color: Yellow / Appearance: Clear / S.010 / pH: x  Gluc: x / Ketone: Trace  / Bili: Negative / Urobili: Negative   Blood: x / Protein: 100 / Nitrite: Negative   Leuk Esterase: Trace / RBC: 0-2 /HPF / WBC 0-2 /HPF   Sq Epi: x / Non Sq Epi: Few /HPF / Bacteria: Negative /HPF      ABG - ( 08 Sep 2018 10:08 )  pH, Arterial: 7.40  pH, Blood: x     /  pCO2: 36    /  pO2: 92    / HCO3: 22    / Base Excess: -1.7  /  SaO2: 97                CARDIAC MARKERS ( 08 Sep 2018 08:27 )  <0.015 ng/mL / x     / x     / x     / x              Lactate, Blood: 0.6 mmol/L (18 @ 08:27)        MICROBIOLOGY:    RADIOLOGY & ADDITIONAL STUDIES:    CXR:  < from: Xray Chest 1 View AP/PA (18 @ 10:06) >  Impression: Successful placement of ET tube and NG tube  Question of developing infiltrate at the right lung base.      < from: CT Head No Cont (18 @ 07:55) >  IMPRESSION:     Limited study demonstrates no obvious abnormality as described above.            < end of copied text >    < end of copied text >    Ct scan chest:    ekg;    echo:

## 2018-09-12 NOTE — PROGRESS NOTE ADULT - SUBJECTIVE AND OBJECTIVE BOX
INTERVAL HPI/OVERNIGHT EVENTS: *** patient is alert and awake. patient said she is hungry. Bed side swallow test passed. will DC jensen. trial of void today. Out of bed to chair. will discontinue central line     PRESSORS: [ ] YES [x ] NO  WHICH:    Antimicrobial:  cefepime   IVPB      cefepime   IVPB 2000 milliGRAM(s) IV Intermittent every 8 hours    Cardiovascular:  carvedilol 25 milliGRAM(s) Oral every 12 hours    Pulmonary:    Hematalogic:    Other:  acetaminophen    Suspension .. 650 milliGRAM(s) Oral every 6 hours PRN  artificial  tears Solution 1 Drop(s) Both EYES two times a day  dextrose 50% Injectable 25 Gram(s) IV Push once  ferrous    sulfate Liquid 300 milliGRAM(s) Oral daily  insulin glargine Injectable (LANTUS) 10 Unit(s) SubCutaneous at bedtime  insulin lispro (HumaLOG) corrective regimen sliding scale   SubCutaneous Before meals and at bedtime  lactulose Syrup 10 Gram(s) Oral two times a day  pantoprazole  Injectable 40 milliGRAM(s) IV Push two times a day  simvastatin 20 milliGRAM(s) Oral at bedtime    acetaminophen    Suspension .. 650 milliGRAM(s) Oral every 6 hours PRN  artificial  tears Solution 1 Drop(s) Both EYES two times a day  carvedilol 25 milliGRAM(s) Oral every 12 hours  cefepime   IVPB      cefepime   IVPB 2000 milliGRAM(s) IV Intermittent every 8 hours  dextrose 50% Injectable 25 Gram(s) IV Push once  ferrous    sulfate Liquid 300 milliGRAM(s) Oral daily  insulin glargine Injectable (LANTUS) 10 Unit(s) SubCutaneous at bedtime  insulin lispro (HumaLOG) corrective regimen sliding scale   SubCutaneous Before meals and at bedtime  lactulose Syrup 10 Gram(s) Oral two times a day  pantoprazole  Injectable 40 milliGRAM(s) IV Push two times a day  simvastatin 20 milliGRAM(s) Oral at bedtime    Drug Dosing Weight    Weight (kg): 66.6 (08 Sep 2018 11:22)      CENTRAL LINE: [ x] YES [ ] NO  LOCATION:   Right IJ  9/8 DATE INSERTED:  will remove today     JENSEN: [x ] YES [ ] NO    DATE INSERTED:  will remove today     A-LINE:  [ ] YES [x ] NO  LOCATION:   DATE INSERTED:    ICU Vital Signs Last 24 Hrs  T(C): 37.3 (12 Sep 2018 12:00), Max: 37.6 (11 Sep 2018 15:59)  T(F): 99.1 (12 Sep 2018 12:00), Max: 99.6 (11 Sep 2018 15:59)  HR: 117 (12 Sep 2018 12:00) (100 - 133)  BP: 148/82 (12 Sep 2018 12:00) (125/77 - 157/95)  BP(mean): 96 (12 Sep 2018 12:00) (82 - 125)  ABP: --  ABP(mean): --  RR: 14 (12 Sep 2018 12:00) (10 - 22)  SpO2: 97% (12 Sep 2018 12:00) (95% - 100%)            09-11 @ 07:01  -  09-12 @ 07:00  --------------------------------------------------------  IN: 419.8 mL / OUT: 4620 mL / NET: -4200.2 mL            PHYSICAL EXAM:    GENERAL:NAD, well-groomed, well-developed  HEAD:  Atraumatic, Normocephalic  EYES: EOMI, PERRLA, conjunctiva and sclera clear  ENMT: No tonsillar erythema, exudates, or enlargement; Moist mucous membranes, Good dentition, No lesions  NECK: Supple, normal appearance, No JVD; Normal thyroid; Trachea midline  NERVOUS SYSTEM: Alert & Oriented X3, Good concentration;   CHEST/LUNG: No chest deformity;Normal percussion bilaterally; No rales, rhonchi, wheezing   HEART: Regular rate and rhythm; No murmurs, rubs, or gallops  ABDOMEN: Soft, Nontender, Nondistended; [ ]Bowel sounds present  EXTREMITIES: 2+ Peripheral Pulses, No clubbing, cyanosis, or edema  LYMPH: No lymphadenopathy noted  SKIN:No rashes or lesions; Good capillary refill      LABS:  CBC Full  -  ( 12 Sep 2018 06:12 )  WBC Count : 11.4 K/uL  Hemoglobin : 12.1 g/dL  Hematocrit : 36.9 %  Platelet Count - Automated : 242 K/uL  Mean Cell Volume : 85.8 fl  Mean Cell Hemoglobin : 28.1 pg  Mean Cell Hemoglobin Concentration : 32.7 gm/dL  Auto Neutrophil # : 7.9 K/uL  Auto Lymphocyte # : 2.2 K/uL  Auto Monocyte # : 0.7 K/uL  Auto Eosinophil # : 0.5 K/uL  Auto Basophil # : 0.1 K/uL  Auto Neutrophil % : 69.6 %  Auto Lymphocyte % : 19.4 %  Auto Monocyte % : 6.2 %  Auto Eosinophil % : 4.2 %  Auto Basophil % : 0.7 %    09-12    140  |  104  |  6<L>  ----------------------------<  173<H>  3.5   |  26  |  0.51    Ca    8.5      12 Sep 2018 06:12  Phos  2.0     09-12  Mg     1.8     09-12    TPro  6.8  /  Alb  2.4<L>  /  TBili  0.6  /  DBili  x   /  AST  10  /  ALT  20  /  AlkPhos  61  09-12        Culture Results:   No growth at 48 hours (09-10 @ 09:54)      RADIOLOGY & ADDITIONAL STUDIES REVIEWED:  ***    [x ]GOALS OF CARE DISCUSSION WITH PATIENT/FAMILY/PROXY: full code     CRITICAL CARE TIME SPENT: 35 minutes

## 2018-09-12 NOTE — SWALLOW BEDSIDE ASSESSMENT ADULT - SWALLOW EVAL: DIAGNOSIS
Pt p/w mild oral dysphagia, c/b reduced bolus formation, reflexive & prolonged chew, and slow a-p transport, reduced laryngeal elevation; however, no overt s/s of laryngeal penetration or aspiration at this exam.

## 2018-09-12 NOTE — SWALLOW BEDSIDE ASSESSMENT ADULT - ASR SWALLOW ASPIRATION MONITOR
oral hygiene/fever/pneumonia/change of breathing pattern/position upright (90Y)/cough/throat clearing/upper respiratory infection/gurgly voice

## 2018-09-12 NOTE — PROGRESS NOTE ADULT - SUBJECTIVE AND OBJECTIVE BOX
PULMONARY  progress note    JACQUELIN LINDER  MRN-[id number]    Patient is a 57y old  Female who presents with a chief complaint of unresponsiveness (11 Sep 2018 17:35)  Pt extubated, follows command, moving all extremities    MEDICATIONS  (STANDING):  artificial  tears Solution 1 Drop(s) Both EYES two times a day  carvedilol 25 milliGRAM(s) Oral every 12 hours  cefepime   IVPB      cefepime   IVPB 2000 milliGRAM(s) IV Intermittent every 8 hours  dextrose 50% Injectable 25 Gram(s) IV Push once  ferrous    sulfate Liquid 300 milliGRAM(s) Oral daily  insulin glargine Injectable (LANTUS) 10 Unit(s) SubCutaneous at bedtime  insulin lispro (HumaLOG) corrective regimen sliding scale   SubCutaneous every 6 hours  lactulose Syrup 10 Gram(s) Oral two times a day  pantoprazole  Injectable 40 milliGRAM(s) IV Push two times a day  simvastatin 20 milliGRAM(s) Oral at bedtime      MEDICATIONS  (PRN):  acetaminophen    Suspension .. 650 milliGRAM(s) Oral every 6 hours PRN Temp greater or equal to 38C (100.4F), Mild Pain (1 - 3)      Allergies    No Known Allergies            PAST MEDICAL & SURGICAL HISTORY:  Cardiac disease  DM (diabetes mellitus)  HTN (hypertension)  H/O cervical spine surgery           REVIEW OF SYSTEMS:  CONSTITUTIONAL: No fever, weight loss, or fatigue   EYES: No eye pain, visual disturbances, or discharge  ENT:  No difficulty hearing, tinnitus, vertigo; No sinus or throat pain  NECK: No pain or stiffness or nodes  RESPIRATORY:  no  cough   wheezing   chills   hemoptysis or Shortness of Breath  CARDIOVASCULAR: No chest pain, palpitations, passing out, dizziness, or leg swelling  GASTROINTESTINAL: No abdominal or epigastric pain. No nausea, vomiting, or hematemesis; No diarrhea or constipation. No melena or hematochezia.  GENITOURINARY: No dysuria, frequency, hematuria, or incontinence  NEUROLOGICAL: No headaches, memory loss, loss of strength, numbness, or tremors  SKIN: No itching, burning, rashes, or lesions   LYMPH Nodes: No enlarged glands  HEME/LYMPH: No easy bruising, or bleeding gums  ALLERGY AND IMMUNOLOGIC: No hives or eczema    Vital Signs Last 24 Hrs  T(C): 36.6 (12 Sep 2018 08:00), Max: 37.6 (11 Sep 2018 15:59)  T(F): 97.8 (12 Sep 2018 08:00), Max: 99.6 (11 Sep 2018 15:59)  HR: 117 (12 Sep 2018 09:00) (77 - 133)  BP: 146/81 (12 Sep 2018 09:00) (100/57 - 157/95)  BP(mean): 97 (12 Sep 2018 09:00) (68 - 125)  RR: 17 (12 Sep 2018 09:00) (10 - 22)  SpO2: 100% (12 Sep 2018 09:00) (95% - 100%)  I&O's Detail    11 Sep 2018 07:01  -  12 Sep 2018 07:00  --------------------------------------------------------  IN:    dexmedetomidine Infusion: 19.8 mL    Solution: 250 mL    Solution: 50 mL    Solution: 100 mL  Total IN: 419.8 mL    OUT:    Indwelling Catheter - Urethral: 4620 mL  Total OUT: 4620 mL    Total NET: -4200.2 mL      12 Sep 2018 07:01  -  12 Sep 2018 09:01  --------------------------------------------------------  IN:    Oral Fluid: 90 mL  Total IN: 90 mL    OUT:    Indwelling Catheter - Urethral: 150 mL  Total OUT: 150 mL    Total NET: -60 mL          PHYSICAL EXAMINATION:    GENERAL: The patient is a well-developed, well-nourished in no apparent distress.   SKIN no rash ecchymoses or bruises  HEENT: Head is normocephalic and atraumatic  SYDNI , Extraocular muscles are intact. Mucous membranes  moist.   Neck supple ,No LN felt JVP not increased  Thyroid not enlarged  Cardiovascular:  S1 S2 heard, RSR, No JVD , systolic  murmur at apex, No gallop or rub  Respiratory: Chest wall symmetrical with good air entry ,Percussion note normal,    Lungs vesicular breathing with fine basilar   rales, no   wheeze	  ABDOMEN:  Soft, Non-tender, obese, no hepatomegaly or splenomegaly BS positive	  Extremities: Normal range of motion, No clubbing, cyanosis or edema  Vascular: Peripheral pulses palpable 2+ bilaterally  CNS:  Alert and oriented x3   Cranial nerves intact  sensory intact  motor power5/5  dtr 2+   Babinski neg    LABS:                        12.1   11.4  )-----------( 242      ( 12 Sep 2018 06:12 )             36.9     09-12    140  |  104  |  6<L>  ----------------------------<  173<H>  3.5   |  26  |  0.51    Ca    8.5      12 Sep 2018 06:12  Phos  2.0     09-12  Mg     1.8     09-12    TPro  6.8  /  Alb  2.4<L>  /  TBili  0.6  /  DBili  x   /  AST  10  /  ALT  20  /  AlkPhos  61  09-12          CARDIAC MARKERS ( 12 Sep 2018 00:12 )  0.154 ng/mL / x     / 96 U/L / x     / <1.0 ng/mL  CARDIAC MARKERS ( 11 Sep 2018 16:16 )  0.206 ng/mL / x     / 102 U/L / x     / <1.0 ng/mL    MICROBIOLOGY:    Culture - Sputum (collected 09-10-18 @ 09:54)  Source: .Sputum Sputum, trap  Gram Stain (09-10-18 @ 14:55):    No polymorphonuclear leukocytes per low power field    No Squamous epithelial cells per low power field    No organisms seen per oil power field  Preliminary Report (09-11-18 @ 07:45):    No growth to date.    Culture - Blood (collected 09-09-18 @ 11:37)  Source: .Blood Blood-Peripheral  Preliminary Report (09-10-18 @ 12:00):    No growth to date.          RADIOLOGY & ADDITIONAL STUDIES:    CXR:< from: Xray Chest 1 View- PORTABLE-Urgent (09.11.18 @ 13:53) >  Right central line, feeding tube again noted. No pneumothorax.    Increased interstitial lung markings without significant change. New   opacity right lung. There are aeration left lung.    Probable small bilateral pleural effusions.    Heart size cannot be accurately assessed in this projection.          ECHO:< from: Transthoracic Echocardiogram (09.09.18 @ 13:31) >    1. Normal mitral valve.  2. Normal trileaflet aortic valve.  3. Aortic Root: 3.4 cm.  4. Normal left atrium.  5. Normal left ventricular internal dimensions and wall  thicknesses.  6. Moderate segmental left ventricular systolic  dysfunction.The apex,mid and distal inferior,septum  and  anterior walls are hypokinetic  7. Grade IV diastolic dysfunction.  8. Normal right atrium.  9. Normal right ventricular size and function.  10. RV systolic pressure is31 mm Hg.  11. Normal tricuspid valve.  12. Normal pulmonic valve.  13. Normal pericardium with no pericardial effusion. PULMONARY  progress note    JACQUELIN LINDER  MRN-[id number]    Patient is a 57y old  Female who presents with a chief complaint of unresponsiveness (11 Sep 2018 17:35)  Pt extubated, follows command, moving all extremities    MEDICATIONS  (STANDING):  artificial  tears Solution 1 Drop(s) Both EYES two times a day  carvedilol 25 milliGRAM(s) Oral every 12 hours  cefepime   IVPB      cefepime   IVPB 2000 milliGRAM(s) IV Intermittent every 8 hours  dextrose 50% Injectable 25 Gram(s) IV Push once  ferrous    sulfate Liquid 300 milliGRAM(s) Oral daily  insulin glargine Injectable (LANTUS) 10 Unit(s) SubCutaneous at bedtime  insulin lispro (HumaLOG) corrective regimen sliding scale   SubCutaneous every 6 hours  lactulose Syrup 10 Gram(s) Oral two times a day  pantoprazole  Injectable 40 milliGRAM(s) IV Push two times a day  simvastatin 20 milliGRAM(s) Oral at bedtime      MEDICATIONS  (PRN):  acetaminophen    Suspension .. 650 milliGRAM(s) Oral every 6 hours PRN Temp greater or equal to 38C (100.4F), Mild Pain (1 - 3)      Allergies    No Known Allergies            PAST MEDICAL & SURGICAL HISTORY:  Cardiac disease  DM (diabetes mellitus)  HTN (hypertension)  H/O cervical spine surgery           REVIEW OF SYSTEMS:  CONSTITUTIONAL: No fever, weight loss, or fatigue   EYES: No eye pain, visual disturbances, or discharge  ENT:  No difficulty hearing, tinnitus, vertigo; No sinus or throat pain  NECK: No pain or stiffness or nodes  RESPIRATORY:  no  cough   wheezing   chills   hemoptysis or Shortness of Breath  CARDIOVASCULAR: No chest pain, palpitations, passing out, dizziness, or leg swelling  GASTROINTESTINAL: No abdominal or epigastric pain. No nausea, vomiting, or hematemesis; No diarrhea or constipation. No melena or hematochezia.  GENITOURINARY: No dysuria, frequency, hematuria, or incontinence  NEUROLOGICAL: No headaches, memory loss, loss of strength, numbness, or tremors  SKIN: No itching, burning, rashes, or lesions   LYMPH Nodes: No enlarged glands  HEME/LYMPH: No easy bruising, or bleeding gums  ALLERGY AND IMMUNOLOGIC: No hives or eczema    Vital Signs Last 24 Hrs  T(C): 36.6 (12 Sep 2018 08:00), Max: 37.6 (11 Sep 2018 15:59)  T(F): 97.8 (12 Sep 2018 08:00), Max: 99.6 (11 Sep 2018 15:59)  HR: 117 (12 Sep 2018 09:00) (77 - 133)  BP: 146/81 (12 Sep 2018 09:00) (100/57 - 157/95)  BP(mean): 97 (12 Sep 2018 09:00) (68 - 125)  RR: 17 (12 Sep 2018 09:00) (10 - 22)  SpO2: 100% (12 Sep 2018 09:00) (95% - 100%)  I&O's Detail    11 Sep 2018 07:01  -  12 Sep 2018 07:00  --------------------------------------------------------  IN:    dexmedetomidine Infusion: 19.8 mL    Solution: 250 mL    Solution: 50 mL    Solution: 100 mL  Total IN: 419.8 mL    OUT:    Indwelling Catheter - Urethral: 4620 mL  Total OUT: 4620 mL    Total NET: -4200.2 mL      12 Sep 2018 07:01  -  12 Sep 2018 09:01  --------------------------------------------------------  IN:    Oral Fluid: 90 mL  Total IN: 90 mL    OUT:    Indwelling Catheter - Urethral: 150 mL  Total OUT: 150 mL    Total NET: -60 mL          PHYSICAL EXAMINATION:    GENERAL: The patient is a well-developed, well-nourished in no apparent distress.   SKIN no rash ecchymoses or bruises  HEENT: Head is normocephalic and atraumatic  SYDNI , Extraocular muscles are intact. Mucous membranes  moist.   Neck supple ,No LN felt JVP not increased  Thyroid not enlarged  Cardiovascular:  S1 S2 heard, RSR, No JVD , systolic  murmur at apex, No gallop or rub  Respiratory: Chest wall symmetrical with good air entry ,Percussion note normal,    Lungs vesicular breathing with fine basilar   rales, no   wheeze	  ABDOMEN:  Soft, Non-tender, obese, no hepatomegaly or splenomegaly BS positive	  Extremities: Normal range of motion, No clubbing, cyanosis or edema  Vascular: Peripheral pulses palpable 2+ bilaterally  CNS:  Alert and oriented x3   Cranial nerves intact  sensory intact  motor power5/5  dtr 2+   Babinski neg    LABS:                        12.1   11.4  )-----------( 242      ( 12 Sep 2018 06:12 )             36.9     09-12    140  |  104  |  6<L>  ----------------------------<  173<H>  3.5   |  26  |  0.51    Ca    8.5      12 Sep 2018 06:12  Phos  2.0     09-12  Mg     1.8     09-12    TPro  6.8  /  Alb  2.4<L>  /  TBili  0.6  /  DBili  x   /  AST  10  /  ALT  20  /  AlkPhos  61  09-12          CARDIAC MARKERS ( 12 Sep 2018 00:12 )  0.154 ng/mL / x     / 96 U/L / x     / <1.0 ng/mL  CARDIAC MARKERS ( 11 Sep 2018 16:16 )  0.206 ng/mL / x     / 102 U/L / x     / <1.0 ng/mL    MICROBIOLOGY:    Culture - Sputum (collected 09-10-18 @ 09:54)  Source: .Sputum Sputum, trap  Gram Stain (09-10-18 @ 14:55):    No polymorphonuclear leukocytes per low power field    No Squamous epithelial cells per low power field    No organisms seen per oil power field  Preliminary Report (09-11-18 @ 07:45):    No growth to date.    Culture - Blood (collected 09-09-18 @ 11:37)  Source: .Blood Blood-Peripheral  Preliminary Report (09-10-18 @ 12:00):    No growth to date.          RADIOLOGY & ADDITIONAL STUDIES:    CXR:< from: Xray Chest 1 View- PORTABLE-Urgent (09.11.18 @ 13:53) >  Right central line, feeding tube again noted. No pneumothorax.    Increased interstitial lung markings without significant change. New   opacity right lung. There are aeration left lung.    Probable small bilateral pleural effusions.    Heart size cannot be accurately assessed in this projection.    CXR Today: < from: Xray Chest 1 View- PORTABLE-Urgent (09.12.18 @ 09:41) >    Heart is magnified by technique.    Nasogastric tube seen on September 11 has been removed. Right jugular   line remains.    On September 11 there were bilateral infiltrates and probable effusions.   These presently shows significant improvement with mild diffuse   infiltration remain.              ECHO:< from: Transthoracic Echocardiogram (09.09.18 @ 13:31) >    1. Normal mitral valve.  2. Normal trileaflet aortic valve.  3. Aortic Root: 3.4 cm.  4. Normal left atrium.  5. Normal left ventricular internal dimensions and wall  thicknesses.  6. Moderate segmental left ventricular systolic  dysfunction .The apex, mid and distal inferior, septum  and  anterior walls are hypokinetic  7. Grade IV diastolic dysfunction.  8. Normal right atrium.  9. Normal right ventricular size and function.  10. RV systolic pressure is31 mm Hg.  11. Normal tricuspid valve.  12. Normal pulmonic valve.  13. Normal pericardium with no pericardial effusion.

## 2018-09-12 NOTE — PROGRESS NOTE ADULT - ASSESSMENT
58 y/o female from [facility] rehab with PMH of HTN, DM and ?cardiac disease (family not sure about exact diagnosis) and PSH of recent C spine surgery (1 month back at [place] for ?nerve compression) sent to ED for unresponsiveness. Likely secondary to hypoglycemia. Was intubated in ED and admitted to ICU on 9/9/10 and extubated on 9/11.

## 2018-09-13 DIAGNOSIS — E87.6 HYPOKALEMIA: ICD-10-CM

## 2018-09-13 LAB
ANION GAP SERPL CALC-SCNC: 9 MMOL/L — SIGNIFICANT CHANGE UP (ref 5–17)
BUN SERPL-MCNC: 9 MG/DL — SIGNIFICANT CHANGE UP (ref 7–18)
CALCIUM SERPL-MCNC: 8.5 MG/DL — SIGNIFICANT CHANGE UP (ref 8.4–10.5)
CHLORIDE SERPL-SCNC: 103 MMOL/L — SIGNIFICANT CHANGE UP (ref 96–108)
CO2 SERPL-SCNC: 27 MMOL/L — SIGNIFICANT CHANGE UP (ref 22–31)
CREAT SERPL-MCNC: 0.57 MG/DL — SIGNIFICANT CHANGE UP (ref 0.5–1.3)
GLUCOSE BLDC GLUCOMTR-MCNC: 182 MG/DL — HIGH (ref 70–99)
GLUCOSE BLDC GLUCOMTR-MCNC: 194 MG/DL — HIGH (ref 70–99)
GLUCOSE BLDC GLUCOMTR-MCNC: 215 MG/DL — HIGH (ref 70–99)
GLUCOSE BLDC GLUCOMTR-MCNC: 353 MG/DL — HIGH (ref 70–99)
GLUCOSE SERPL-MCNC: 181 MG/DL — HIGH (ref 70–99)
HCT VFR BLD CALC: 36.8 % — SIGNIFICANT CHANGE UP (ref 34.5–45)
HGB BLD-MCNC: 12.1 G/DL — SIGNIFICANT CHANGE UP (ref 11.5–15.5)
MAGNESIUM SERPL-MCNC: 1.8 MG/DL — SIGNIFICANT CHANGE UP (ref 1.6–2.6)
MCHC RBC-ENTMCNC: 28.2 PG — SIGNIFICANT CHANGE UP (ref 27–34)
MCHC RBC-ENTMCNC: 32.9 GM/DL — SIGNIFICANT CHANGE UP (ref 32–36)
MCV RBC AUTO: 85.7 FL — SIGNIFICANT CHANGE UP (ref 80–100)
PHOSPHATE SERPL-MCNC: 2.3 MG/DL — LOW (ref 2.5–4.5)
PLATELET # BLD AUTO: 267 K/UL — SIGNIFICANT CHANGE UP (ref 150–400)
POTASSIUM SERPL-MCNC: 3.1 MMOL/L — LOW (ref 3.5–5.3)
POTASSIUM SERPL-SCNC: 3.1 MMOL/L — LOW (ref 3.5–5.3)
RBC # BLD: 4.29 M/UL — SIGNIFICANT CHANGE UP (ref 3.8–5.2)
RBC # FLD: 12.3 % — SIGNIFICANT CHANGE UP (ref 10.3–14.5)
SODIUM SERPL-SCNC: 139 MMOL/L — SIGNIFICANT CHANGE UP (ref 135–145)
WBC # BLD: 9.4 K/UL — SIGNIFICANT CHANGE UP (ref 3.8–10.5)
WBC # FLD AUTO: 9.4 K/UL — SIGNIFICANT CHANGE UP (ref 3.8–10.5)

## 2018-09-13 PROCEDURE — 71045 X-RAY EXAM CHEST 1 VIEW: CPT | Mod: 26

## 2018-09-13 RX ORDER — ACETAMINOPHEN 500 MG
1000 TABLET ORAL ONCE
Qty: 0 | Refills: 0 | Status: COMPLETED | OUTPATIENT
Start: 2018-09-13 | End: 2018-09-13

## 2018-09-13 RX ORDER — SODIUM CHLORIDE 9 MG/ML
500 INJECTION INTRAMUSCULAR; INTRAVENOUS; SUBCUTANEOUS ONCE
Qty: 0 | Refills: 0 | Status: DISCONTINUED | OUTPATIENT
Start: 2018-09-13 | End: 2018-09-13

## 2018-09-13 RX ORDER — INSULIN GLARGINE 100 [IU]/ML
14 INJECTION, SOLUTION SUBCUTANEOUS AT BEDTIME
Qty: 0 | Refills: 0 | Status: DISCONTINUED | OUTPATIENT
Start: 2018-09-13 | End: 2018-09-14

## 2018-09-13 RX ORDER — INSULIN LISPRO 100/ML
3 VIAL (ML) SUBCUTANEOUS
Qty: 0 | Refills: 0 | Status: DISCONTINUED | OUTPATIENT
Start: 2018-09-13 | End: 2018-09-14

## 2018-09-13 RX ORDER — POTASSIUM CHLORIDE 20 MEQ
40 PACKET (EA) ORAL
Qty: 0 | Refills: 0 | Status: COMPLETED | OUTPATIENT
Start: 2018-09-13 | End: 2018-09-13

## 2018-09-13 RX ORDER — SODIUM CHLORIDE 9 MG/ML
1000 INJECTION INTRAMUSCULAR; INTRAVENOUS; SUBCUTANEOUS ONCE
Qty: 0 | Refills: 0 | Status: COMPLETED | OUTPATIENT
Start: 2018-09-13 | End: 2018-09-13

## 2018-09-13 RX ORDER — POTASSIUM CHLORIDE 20 MEQ
40 PACKET (EA) ORAL
Qty: 0 | Refills: 0 | Status: DISCONTINUED | OUTPATIENT
Start: 2018-09-13 | End: 2018-09-13

## 2018-09-13 RX ADMIN — LACTULOSE 10 GRAM(S): 10 SOLUTION ORAL at 05:36

## 2018-09-13 RX ADMIN — Medication 10: at 21:29

## 2018-09-13 RX ADMIN — SIMVASTATIN 20 MILLIGRAM(S): 20 TABLET, FILM COATED ORAL at 21:23

## 2018-09-13 RX ADMIN — Medication 4: at 05:36

## 2018-09-13 RX ADMIN — Medication 3 UNIT(S): at 11:30

## 2018-09-13 RX ADMIN — CEFEPIME 100 MILLIGRAM(S): 1 INJECTION, POWDER, FOR SOLUTION INTRAMUSCULAR; INTRAVENOUS at 21:23

## 2018-09-13 RX ADMIN — INSULIN GLARGINE 14 UNIT(S): 100 INJECTION, SOLUTION SUBCUTANEOUS at 21:28

## 2018-09-13 RX ADMIN — Medication 2: at 11:30

## 2018-09-13 RX ADMIN — Medication 40 MILLIEQUIVALENT(S): at 09:20

## 2018-09-13 RX ADMIN — CEFEPIME 100 MILLIGRAM(S): 1 INJECTION, POWDER, FOR SOLUTION INTRAMUSCULAR; INTRAVENOUS at 05:36

## 2018-09-13 RX ADMIN — PANTOPRAZOLE SODIUM 40 MILLIGRAM(S): 20 TABLET, DELAYED RELEASE ORAL at 17:03

## 2018-09-13 RX ADMIN — Medication 1 DROP(S): at 05:36

## 2018-09-13 RX ADMIN — Medication 300 MILLIGRAM(S): at 11:29

## 2018-09-13 RX ADMIN — Medication 1 DROP(S): at 17:04

## 2018-09-13 RX ADMIN — SODIUM CHLORIDE 4000 MILLILITER(S): 9 INJECTION INTRAMUSCULAR; INTRAVENOUS; SUBCUTANEOUS at 08:10

## 2018-09-13 RX ADMIN — LACTULOSE 10 GRAM(S): 10 SOLUTION ORAL at 17:03

## 2018-09-13 RX ADMIN — CEFEPIME 100 MILLIGRAM(S): 1 INJECTION, POWDER, FOR SOLUTION INTRAMUSCULAR; INTRAVENOUS at 14:26

## 2018-09-13 RX ADMIN — PANTOPRAZOLE SODIUM 40 MILLIGRAM(S): 20 TABLET, DELAYED RELEASE ORAL at 05:36

## 2018-09-13 RX ADMIN — Medication 3 UNIT(S): at 17:03

## 2018-09-13 RX ADMIN — Medication 2: at 17:03

## 2018-09-13 RX ADMIN — Medication 400 MILLIGRAM(S): at 14:26

## 2018-09-13 RX ADMIN — Medication 40 MILLIEQUIVALENT(S): at 11:29

## 2018-09-13 RX ADMIN — CARVEDILOL PHOSPHATE 25 MILLIGRAM(S): 80 CAPSULE, EXTENDED RELEASE ORAL at 05:36

## 2018-09-13 NOTE — PROGRESS NOTE ADULT - PROBLEM SELECTOR PLAN 4
-most likely secondary to hypoglycemia as patient was noted to be hypoglycemic to 40s at rehab on admission, also received glucagon in ED.  -CT head negative for any acute event  -CXR clear, lactate 0.6, UA is negative, s/p 1 dose of Zosyn in ED  -Blood alcohol <3, serum acetaminophen and salicylate level wnl.   -Utox positive for benzodiazepines (takes valium 5 mg q12)  -Could be CVA v/s seizure, metabolic encephalopathy will consider MRI when stable  -patient is now Extubated and passed bed side swallow eval.

## 2018-09-13 NOTE — PROGRESS NOTE ADULT - PROBLEM SELECTOR PLAN 1
Resolved  secondary to PN   afebrile in 24 hours   -blood culture NTD  CT abd: B/l lower lobe consolidation   c/w cefepime

## 2018-09-13 NOTE — PROGRESS NOTE ADULT - SUBJECTIVE AND OBJECTIVE BOX
JACQUELIN LINDER  [id number]    Patient is a 57y old  Female who presents with a chief complaint of unresponsiveness (08 Sep 2018 13:02)  patient seen and examined      s off vent this am   more awake .can responds to vocal command    .MEDICATIONS  (STANDING):  artificial  tears Solution 1 Drop(s) Both EYES two times a day  cefepime   IVPB      cefepime   IVPB 2000 milliGRAM(s) IV Intermittent every 8 hours  ferrous    sulfate Liquid 300 milliGRAM(s) Oral daily  insulin glargine Injectable (LANTUS) 14 Unit(s) SubCutaneous at bedtime  insulin lispro (HumaLOG) corrective regimen sliding scale   SubCutaneous Before meals and at bedtime  insulin lispro Injectable (HumaLOG) 3 Unit(s) SubCutaneous three times a day before meals  lactulose Syrup 10 Gram(s) Oral two times a day  pantoprazole  Injectable 40 milliGRAM(s) IV Push two times a day  potassium chloride   Powder 40 milliEquivalent(s) Oral every 2 hours  simvastatin 20 milliGRAM(s) Oral at bedtime    MEDICATIONS  (PRN):  acetaminophen    Suspension .. 650 milliGRAM(s) Oral every 6 hours PRN Temp greater or equal to 38C (100.4F), Mild Pain (1 - 3)    Allergies    No Known Allergies    Intolerances        PAST MEDICAL & SURGICAL HISTORY:  Cardiac disease  DM (diabetes mellitus)  HTN (hypertension)  H/O cervical spine surgery      FAMILY HISTORY:  Family history unknown      SOCIAL HISTORY  Smoking History:     REVIEW OF SYSTEMS:    CONSTITUTIONAL:  Fevers [ ]  Yes  [x  ]  No                                   chills [  ]  Yes  [X  ]  No                               sweats  [  ]  Yes  [ X ]  No                                fatigue [x]  Yes  [ ]  No    HEENT:  Eyes:  Diplopia or blurred vision [  ]  Yes  [x  ]  No    ENT:                        earache  [  ]  Yes  [  x]  No                             sore throat  [  ]  Yes  [  x]  No                            runny nose.  [  ]  Yes  [  x]  No    CARDIOVASCULAR:       chest pain  [  ]  Yes  [x  ]  No                        squeezing, tightness,  [  ]  Yes  [  x]  No                                      palpitations.  [  ]  Yes  [  x]  No    RESPIRATORY:             Cough [  x]  Yes  [  ]  No                                  Wheezing [  ]  Yes  [  ]  No                                Hemoptysis [  ]  Yes  [  ]  No                                      Sputum [  ]  Yes  [  ]  No                   Shortness of Breathe [  ]  Yes  [ x ]  No    GASTROINTESTINAL:      Abdominal pain  [  ]  Yes  [ x ]  No                                                    Nausea  [  ]  Yes  [ x ]  No                                                   Vomiting [  ]  Yes  [ x ]  No                                              Constipation [  ]  Yes  [ x ]  No                                                    Diarrhea [  ]  Yes  [  x]  No    GENITOURINARY:           Incontinence [  x]  Yes  [  ]  No                                                Dysuria [  ]  Yes  [  ]  No                                      Blood in Urine  [  ]  Yes  [  ]  No                          Frequency or urgency.  [  ]  Yes  [  ]  No    NEUROLOGIC:                 Paresthesias  [  ]  Yes  [x  ]  No                                             fasciculations  [  ]  Yes  [x  ]  No                                seizures or weakness.  [  ]  Yes  [x  ]  No                                                   Headache [  ]  Yes  [xx  ]  No                                  Loss of Conciousness [  ]  Yes  [  ]  No                                                     Insomnia [  ]  Yes  [ x ]  No    PSYCHIATRIC:   awake   Disorder of thought or mood.  [  ]  Yes  [  ]  No                                                           Anxiety [  ]  Yes  [  ]  No                                                      Depression [  ]  Yes  [  ]  No                                                         Dementia [  ]  Yes  [  ]  No  .ICU Vital Signs Last 24 Hrs  T(C): 37.1 (12 Sep 2018 20:45), Max: 37.3 (12 Sep 2018 12:00)  T(F): 98.8 (12 Sep 2018 20:45), Max: 99.1 (12 Sep 2018 12:00)  HR: 82 (13 Sep 2018 08:30) (73 - 124)  BP: 92/60 (13 Sep 2018 08:30) (71/39 - 148/82)  BP(mean): 67 (13 Sep 2018 08:30) (46 - 108)  ABP: --  ABP(mean): --  RR: 19 (13 Sep 2018 08:30) (13 - 29)  SpO2: 95% (13 Sep 2018 08:30) (90% - 100%)      PHYSICAL EXAMINATION:    GENERAL: The patient is a well-developed, well-nourished _____in no apparent distress.     HEENT:  n perrla    NECK: Supple. jvd [  ]  Yes  [  X]  No    LUNGS: Clear to auscultation  [  ]  Yes  [  x  No                               wheezing, [  ]  Yes  [X  ]  No                                      rales  [  ]  Yes  [  ]X  No                                    rhonchi  [ x ]  Yes  [  ]  No                 Respirations labored  [  ]  Yes  [  ]  No    HEART: Regular rate and rhythm  [ X ]  Yes  [  ]  No                                        Murmur [  ]  Yes  [  X]  No                                              rub  [  ]  Yes  [ X ]  No                                          gallop  [  ]  Yes  [  ]X  No    ABDOMEN:                                 Soft, X[  ]  Yes  [  ]  No                                             nontender [X  ]  Yes  [  ]  No                                             distended.[  ]  Yes  [X  ]  No                            hepatosplenomegaly ,[  ]  Yes  [ X ]  No                                                    BS   [  ]  Yes  [  ]  No    EXTREMITIES:                cyanosis, [  ]  Yes  [ X ]  No                                       clubbing,   [  ]  Yes  [  X]  No                                               rash, [  ]  Yes  [ X ]  No                                           edema.  [  ]  Yes  [ X ]  No    NEUROLOGIC:   fully wake   LABS:                        12.1   9.4   )-----------( 267      ( 13 Sep 2018 06:43 )             36.8     09-13    139  |  103  |  9   ----------------------------<  181<H>  3.1<L>   |  27  |  0.57    Ca    8.5      13 Sep 2018 06:43  Phos  2.3     09-13  Mg     1.8     09-13    TPro  6.8  /  Alb  2.4<L>  /  TBili  0.6  /  DBili  x   /  AST  10  /  ALT  20  /  AlkPhos  61  09-12          CARDIAC MARKERS ( 12 Sep 2018 00:12 )  0.154 ng/mL / x     / 96 U/L / x     / <1.0 ng/mL  CARDIAC MARKERS ( 11 Sep 2018 16:16 )  0.206 ng/mL / x     / 102 U/L / x     / <1.0 ng/mL                    LABS:                        12.1   11.4  )-----------( 242      ( 12 Sep 2018 06:12 )             36.9     09-12    140  |  104  |  6<L>  ----------------------------<  173<H>  3.5   |  26  |  0.51    Ca    8.5      12 Sep 2018 06:12  Phos  2.0     09-12  Mg     1.8     09-12    TPro  6.8  /  Alb  2.4<L>  /  TBili  0.6  /  DBili  x   /  AST  10  /  ALT  20  /  AlkPhos  61  09-12          CARDIAC MARKERS ( 12 Sep 2018 00:12 )  0.154 ng/mL / x     / 96 U/L / x     / <1.0 ng/mL  CARDIAC MARKERS ( 11 Sep 2018 16:16 )  0.206 ng/mL / x     / 102 U/L / x     / <1.0 ng/mL                  LABS:                        11.1   11.9  )-----------( 214      ( 11 Sep 2018 06:28 )             34.0     09-11    137  |  108  |  6<L>  ----------------------------<  202<H>  4.0   |  20<L>  |  0.56    Ca    7.5<L>      11 Sep 2018 06:28  Phos  1.9     09-11  Mg     1.9     09-11    TPro  5.9<L>  /  Alb  2.2<L>  /  TBili  0.4  /  DBili  x   /  AST  14  /  ALT  22  /  AlkPhos  57  09-10        ABG - ( 10 Sep 2018 04:26 )  pH, Arterial: 7.35  pH, Blood: x     /  pCO2: 36    /  pO2: 119   / HCO3: 20    / Base Excess: -5.0  /  SaO2: 99                                LABS:                        11.6   15.0  )-----------( 227      ( 10 Sep 2018 06:13 )             35.6     09-10    140  |  111<H>  |  6<L>  ----------------------------<  142<H>  3.9   |  21<L>  |  0.58    Ca    7.5<L>      10 Sep 2018 06:13  Phos  1.1     09-10  Mg     1.6     -10    TPro  5.9<L>  /  Alb  2.2<L>  /  TBili  0.4  /  DBili  x   /  AST  14  /  ALT  22  /  AlkPhos  57  09-10    PT/INR - ( 09 Sep 2018 00:36 )   PT: 13.8 sec;   INR: 1.26 ratio           Urinalysis Basic - ( 08 Sep 2018 15:09 )    Color: Yellow / Appearance: Clear / S.010 / pH: x  Gluc: x / Ketone: Trace  / Bili: Negative / Urobili: Negative   Blood: x / Protein: 100 / Nitrite: Negative   Leuk Esterase: Trace / RBC: 0-2 /HPF / WBC 0-2 /HPF   Sq Epi: x / Non Sq Epi: Few /HPF / Bacteria: Negative /HPF      ABG - ( 10 Sep 2018 04:26 )  pH, Arterial: 7.35  pH, Blood: x     /  pCO2: 36    /  pO2: 119   / HCO3: 20    / Base Excess: -5.0  /  SaO2: 99                                LABS:                        11.4   13.4  )-----------( 219      ( 09 Sep 2018 09:38 )             33.7         141  |  112<H>  |  15  ----------------------------<  172<H>  3.4<L>   |  20<L>  |  0.74    Ca    6.7<L>      09 Sep 2018 00:36    TPro  8.3  /  Alb  3.7  /  TBili  <0.1<L>  /  DBili  x   /  AST  25  /  ALT  44  /  AlkPhos  85      PT/INR - ( 09 Sep 2018 00:36 )   PT: 13.8 sec;   INR: 1.26 ratio         PTT - ( 08 Sep 2018 08:27 )  PTT:34.2 sec  Urinalysis Basic - ( 08 Sep 2018 15:09 )    Color: Yellow / Appearance: Clear / S.010 / pH: x  Gluc: x / Ketone: Trace  / Bili: Negative / Urobili: Negative   Blood: x / Protein: 100 / Nitrite: Negative   Leuk Esterase: Trace / RBC: 0-2 /HPF / WBC 0-2 /HPF   Sq Epi: x / Non Sq Epi: Few /HPF / Bacteria: Negative /HPF      ABG - ( 09 Sep 2018 04:43 )  pH, Arterial: 7.29  pH, Blood: x     /  pCO2: 40    /  pO2: 108   / HCO3: 19    / Base Excess: -6.7  /  SaO2: 98                CARDIAC MARKERS ( 08 Sep 2018 08:27 )  <0.015 ng/mL / x     / x     / x     / x        < from: Xray Chest 1 View- PORTABLE-Routine (18 @ 10:01) >  IMPRESSION:  Mild pulmonary venous congestion.  .    < from: CT Abdomen and Pelvis No Cont (18 @ 03:24) >  IMPRESSION:  No retroperitoneal hemorrhage. No bowel obstruction.    Extensive bilateral lower lobe consolidation. Differential diagnosis   includes multifocal pneumonia, aspiration pneumonia, alveolar hemorrhage.    Mild pulmonary venous congestion.        < end of copied text >      Lactate, Blood: 1.3 mmol/L (18 @ 00:36)          LABS:                        11.6   11.3  )-----------( 329      ( 08 Sep 2018 08:27 )             35.5         136  |  104  |  20<H>  ----------------------------<  151<H>  4.6   |  23  |  0.77    Ca    8.9      08 Sep 2018 08:27    TPro  8.3  /  Alb  3.7  /  TBili  <0.1<L>  /  DBili  x   /  AST  25  /  ALT  44  /  AlkPhos  85      PT/INR - ( 08 Sep 2018 08:27 )   PT: 10.1 sec;   INR: 0.93 ratio         PTT - ( 08 Sep 2018 08:27 )  PTT:34.2 sec  Urinalysis Basic - ( 08 Sep 2018 15:09 )    Color: Yellow / Appearance: Clear / S.010 / pH: x  Gluc: x / Ketone: Trace  / Bili: Negative / Urobili: Negative   Blood: x / Protein: 100 / Nitrite: Negative   Leuk Esterase: Trace / RBC: 0-2 /HPF / WBC 0-2 /HPF   Sq Epi: x / Non Sq Epi: Few /HPF / Bacteria: Negative /HPF      ABG - ( 08 Sep 2018 10:08 )  pH, Arterial: 7.40  pH, Blood: x     /  pCO2: 36    /  pO2: 92    / HCO3: 22    / Base Excess: -1.7  /  SaO2: 97                CARDIAC MARKERS ( 08 Sep 2018 08:27 )  <0.015 ng/mL / x     / x     / x     / x              Lactate, Blood: 0.6 mmol/L (18 @ 08:27)        MICROBIOLOGY:    RADIOLOGY & ADDITIONAL STUDIES:    CXR:  < from: Xray Chest 1 View AP/PA (18 @ 10:06) >  Impression: Successful placement of ET tube and NG tube  Question of developing infiltrate at the right lung base.      < from: CT Head No Cont (18 @ 07:55) >  IMPRESSION:     Limited study demonstrates no obvious abnormality as described above.            < end of copied text >    < end of copied text >    Ct scan chest:    ekg;    echo:

## 2018-09-13 NOTE — PHYSICAL THERAPY INITIAL EVALUATION ADULT - DIAGNOSIS, PT EVAL
generalized pain; generalized weakness; difficulty with bed mobility; inability to perform transfers and ambulation

## 2018-09-13 NOTE — PROGRESS NOTE ADULT - SUBJECTIVE AND OBJECTIVE BOX
PULMONARY  progress note/Final note    JACQUELIN LINDER  MRN-[id number]    Patient is a 57y old  Female who presents with a chief complaint of unresponsiveness (12 Sep 2018 19:52)  Pt tolerating extubation well , no c/o pt      MEDICATIONS  (STANDING):  artificial  tears Solution 1 Drop(s) Both EYES two times a day  cefepime   IVPB      cefepime   IVPB 2000 milliGRAM(s) IV Intermittent every 8 hours  ferrous    sulfate Liquid 300 milliGRAM(s) Oral daily  insulin glargine Injectable (LANTUS) 14 Unit(s) SubCutaneous at bedtime  insulin lispro (HumaLOG) corrective regimen sliding scale   SubCutaneous Before meals and at bedtime  insulin lispro Injectable (HumaLOG) 3 Unit(s) SubCutaneous three times a day before meals  lactulose Syrup 10 Gram(s) Oral two times a day  pantoprazole  Injectable 40 milliGRAM(s) IV Push two times a day  potassium chloride   Powder 40 milliEquivalent(s) Oral every 2 hours  simvastatin 20 milliGRAM(s) Oral at bedtime      MEDICATIONS  (PRN):  acetaminophen    Suspension .. 650 milliGRAM(s) Oral every 6 hours PRN Temp greater or equal to 38C (100.4F), Mild Pain (1 - 3)      Allergies    No Known Allergies            PAST MEDICAL & SURGICAL HISTORY:  Cardiac disease  DM (diabetes mellitus)  HTN (hypertension)  H/O cervical spine surgery           REVIEW OF SYSTEMS:  CONSTITUTIONAL: No fever, weight loss, or fatigue   EYES: No eye pain, visual disturbances, or discharge  ENT:  No difficulty hearing, tinnitus, vertigo; No sinus or throat pain  NECK: No pain or stiffness or nodes  RESPIRATORY: no  cough   wheezing   chills   hemoptysis or Shortness of Breath  CARDIOVASCULAR: No chest pain, palpitations, passing out, dizziness, or leg swelling  GASTROINTESTINAL: No abdominal or epigastric pain. No nausea, vomiting, or hematemesis; No diarrhea or constipation. No melena or hematochezia.  LYMPH Nodes: No enlarged glands  ENDOCRINE: No heat or cold intolerance; No hair loss  MUSCULOSKELETAL: No joint pain or swelling; No muscle, back, or extremity pain  PSYCHIATRIC: No depression, anxiety, mood swings, or difficulty sleeping  HEME/LYMPH: No easy bruising, or bleeding gums  ALLERGY AND IMMUNOLOGIC: No hives or eczema    Vital Signs Last 24 Hrs  T(C): 37.1 (12 Sep 2018 20:45), Max: 37.3 (12 Sep 2018 12:00)  T(F): 98.8 (12 Sep 2018 20:45), Max: 99.1 (12 Sep 2018 12:00)  HR: 82 (13 Sep 2018 08:30) (73 - 124)  BP: 92/60 (13 Sep 2018 08:30) (71/39 - 148/82)  BP(mean): 67 (13 Sep 2018 08:30) (46 - 108)  RR: 19 (13 Sep 2018 08:30) (13 - 29)  SpO2: 95% (13 Sep 2018 08:30) (90% - 100%)  I&O's Detail    12 Sep 2018 07:01  -  13 Sep 2018 07:00  --------------------------------------------------------  IN:    Oral Fluid: 565 mL    Solution: 50 mL  Total IN: 615 mL    OUT:    Indwelling Catheter - Urethral: 288 mL    Voided: 800 mL  Total OUT: 1088 mL    Total NET: -473 mL      13 Sep 2018 07:01  -  13 Sep 2018 09:40  --------------------------------------------------------  IN:    Sodium Chloride 0.9% IV Bolus: 1000 mL  Total IN: 1000 mL    OUT:    Indwelling Catheter - Urethral: 400 mL  Total OUT: 400 mL    Total NET: 600 mL          PHYSICAL EXAMINATION:    GENERAL: The patient is a well-developed, well-nourished in no apparent distress.   SKIN no rash ecchymoses or bruises  HEENT: Head is normocephalic and atraumatic  SYDNI , Extraocular muscles are intact. Mucous membranes  moist.   Neck supple ,No LN felt JVP not increased  Thyroid not enlarged  Cardiovascular:  S1 S2 heard, RSR, No JVD , systolic  murmur at apex, No gallop or rub  Respiratory: Chest wall symmetrical with good air entry ,Percussion note normal,    Lungs vesicular breathing with no   rales  no  wheeze	  ABDOMEN:  Soft, Non-tender, obese, no hepatomegaly or splenomegaly BS positive	  Extremities: Normal range of motion, No clubbing, cyanosis or edema  Vascular: Peripheral pulses palpable 2+ bilaterally  CNS:  Alert and responsive   Cranial nerves intact  sensory intact  motor power5/5  dtr 2+   Babinski neg    LABS:                        12.1   9.4   )-----------( 267      ( 13 Sep 2018 06:43 )             36.8     09-13    139  |  103  |  9   ----------------------------<  181<H>  3.1<L>   |  27  |  0.57    Ca    8.5      13 Sep 2018 06:43  Phos  2.3     09-13  Mg     1.8     09-13    TPro  6.8  /  Alb  2.4<L>  /  TBili  0.6  /  DBili  x   /  AST  10  /  ALT  20  /  AlkPhos  61  09-12          CARDIAC MARKERS ( 12 Sep 2018 00:12 )  0.154 ng/mL / x     / 96 U/L / x     / <1.0 ng/mL  CARDIAC MARKERS ( 11 Sep 2018 16:16 )  0.206 ng/mL / x     / 102 U/L / x     / <1.0 ng/m      MICROBIOLOGY:    Culture - Sputum (collected 09-10-18 @ 09:54)  Source: .Sputum Sputum, trap  Gram Stain (09-10-18 @ 14:55):    No polymorphonuclear leukocytes per low power field    No Squamous epithelial cells per low power field    No organisms seen per oil power field  Final Report (09-12-18 @ 09:43):    No growth at 48 hours    Culture - Blood (collected 09-09-18 @ 11:37)  Source: .Blood Blood-Peripheral  Preliminary Report (09-10-18 @ 12:00):    No growth to date.          RADIOLOGY & ADDITIONAL STUDIES:    CXR: No infiltrate or chf

## 2018-09-13 NOTE — PROGRESS NOTE ADULT - ASSESSMENT
A 58 yo female who [facility] and unresponsiveness. As per EMS, patient was thought to have low blood sugars so was treated for that without improvement in mental status. Hence, intubated by ED attending and admitted to ICU for further management. She found to have Leukocytosis, fever and B/L extensive Lower lobe infiltrate. She has started on Ceftriaxone and Doxycyline and The ID consult requested to assist with further evaluation and antibiotic management.    # Septic shock- on pressor  # B/L Lower lobe Pneumonia - MRSA/MSSA PCR is negative  - Suspected Gram Negative Pneumonia    Would recommend:    1. Continue Cefepime  inpatient , May change to oral Abx on discharge to complete the course  2. Aspiration precaution  3. Continue supplemental oxygenation and bronchodilator as needed  4. OOb to chair/PT    d/w ICU team and Family at the bed side    will follow the patient with you A 56 yo female [facility]respons[facility]. As per EMS, patient was thought to have low blood sugars so was treated for that without improvement in mental status. Hence, intubated by ED attending and admitted to ICU for further management. She found to have Leukocytosis, fever and B/L extensive Lower lobe infiltrate. She has started on Ceftriaxone and Doxycyline and The ID consult requested to assist with further evaluation and antibiotic management.    # Septic shock- on pressor  # B/L Lower lobe Pneumonia - MRSA/MSSA PCR is negative  - Suspected Gram Negative Pneumonia    Would recommend:    1. OOb to chair/PT  2. Continue Cefepime  inpatient , May change to oral Abx on discharge to continue until 9/18/18  3. Aspiration precaution  4. Continue supplemental oxygenation and bronchodilator as needed     d/w ICU team     will follow the patient with you

## 2018-09-13 NOTE — PHYSICAL THERAPY INITIAL EVALUATION ADULT - GENERAL OBSERVATIONS, REHAB EVAL
awake, alert, NAD; IV access on left forearm; currently on O2@2L/min via NC; + surgical scar on posterior aspect of neck s/p history of cervical spine surgery; + indwelling urethral catheter

## 2018-09-13 NOTE — PROGRESS NOTE ADULT - SUBJECTIVE AND OBJECTIVE BOX
INTERVAL HPI/OVERNIGHT EVENTS:  hypotensive episode over nite and responded to ivf      Antimicrobial:  cefepime   IVPB      cefepime   IVPB 2000 milliGRAM(s) IV Intermittent every 8 hours    Cardiovascular:    Pulmonary:    Hematalogic:    Other:  acetaminophen    Suspension .. 650 milliGRAM(s) Oral every 6 hours PRN  artificial  tears Solution 1 Drop(s) Both EYES two times a day  ferrous    sulfate Liquid 300 milliGRAM(s) Oral daily  insulin glargine Injectable (LANTUS) 14 Unit(s) SubCutaneous at bedtime  insulin lispro (HumaLOG) corrective regimen sliding scale   SubCutaneous Before meals and at bedtime  insulin lispro Injectable (HumaLOG) 3 Unit(s) SubCutaneous three times a day before meals  lactulose Syrup 10 Gram(s) Oral two times a day  pantoprazole  Injectable 40 milliGRAM(s) IV Push two times a day  simvastatin 20 milliGRAM(s) Oral at bedtime      Drug Dosing Weight    Weight (kg): 66.6 (08 Sep 2018 11:22)    CENTRAL LINE: [ ] YES [ ] NO  LOCATION:   DATE INSERTED:    SANCHEZ: [ ] YES [ ] NO    DATE INSERTED:    A-LINE:  [ ] YES [ ] NO  LOCATION:   DATE INSERTED:    PMH/Social Hx/Fam Hx -reviewed admission note, no change since admission  PAST MEDICAL & SURGICAL HISTORY:  Cardiac disease  DM (diabetes mellitus)  HTN (hypertension)  H/O cervical spine surgery      T(C): 37.1 (09-12-18 @ 20:45), Max: 37.1 (09-12-18 @ 16:00)  HR: 88 (09-13-18 @ 12:30)  BP: 106/57 (09-13-18 @ 12:30)  BP(mean): 69 (09-13-18 @ 12:30)  ABP: --  ABP(mean): --  RR: 20 (09-13-18 @ 12:30)  SpO2: 96% (09-13-18 @ 12:30)  Wt(kg): --          09-12 @ 07:01  -  09-13 @ 07:00  --------------------------------------------------------  IN: 615 mL / OUT: 1088 mL / NET: -473 mL            PHYSICAL EXAM:    GENERAL: No signs of distress, comfortable  HEAD: Atraumatic, Normocephalic  EYES: EOMI, PERRLA  ENMT: No erythema, exudates, or enlargement, Moist mucous membranes  NECK: Supple, normal appearance, No JVD; [  ] central line (if applicable)  CHEST/LUNG: No chest deformity, fair bilateral air entry; No rales, rhonchi, wheezing; crackles  HEART: Regular rate and rhythm; No murmurs, rubs, or gallops;   ABDOMEN: Soft, Nontender, Nondistended; Bowel sounds present  EXTREMITIES:  + Peripheral Pulses, No clubbing, cyanosis, or edema  NERVOUS SYSTEM: awake and alert x 3, follows commands, upper and lower extremities  LYMPH: No lymphadenopathy noted  SKIN: No rashes or lesions; good turgor, warm, dry      LABS:  CBC Full  -  ( 13 Sep 2018 06:43 )  WBC Count : 9.4 K/uL  Hemoglobin : 12.1 g/dL  Hematocrit : 36.8 %  Platelet Count - Automated : 267 K/uL  Mean Cell Volume : 85.7 fl  Mean Cell Hemoglobin : 28.2 pg  Mean Cell Hemoglobin Concentration : 32.9 gm/dL  Auto Neutrophil # : x  Auto Lymphocyte # : x  Auto Monocyte # : x  Auto Eosinophil # : x  Auto Basophil # : x  Auto Neutrophil % : x  Auto Lymphocyte % : x  Auto Monocyte % : x  Auto Eosinophil % : x  Auto Basophil % : x    09-13    139  |  103  |  9   ----------------------------<  181<H>  3.1<L>   |  27  |  0.57    Ca    8.5      13 Sep 2018 06:43  Phos  2.3     09-13  Mg     1.8     09-13    TPro  6.8  /  Alb  2.4<L>  /  TBili  0.6  /  DBili  x   /  AST  10  /  ALT  20  /  AlkPhos  61  09-12            RADIOLOGY & ADDITIONAL STUDIES REVIEWED    CXR:< from: Xray Chest 1 View- PORTABLE-Routine (09.13.18 @ 10:15) >    INTERPRETATION:  Portable chest x-ray    Indication: Pneumonia. Metabolic encephalopathy.    Portable chest x-ray is compared to a previous examination dated   9/12/2018.    Impression: Previously noted right IJ central venous catheter has been   removed.    The lung apices are excluded from the radiograph. No gross pleural   effusion or pneumothorax.    Previously noted airspace opacification inthe left lung has resolved.   Slight airspace opacification in the right lung has been unchanged.    The trachea is midline.    Stable cardiac silhouette.      < end of copied text >      EEG:DESCRIPTION OF RECORDING:   The recording is generally poorly organized.     The background consists of generalized polymorphic asynchronous theta and delta which is generally medium in amplitude.     Some superimposed faster frequencies are present.     Wakefulness is characterized by a posterior dominant rhythm frequency of 8 Hz which is low in amplitude and symmetric.     Stage II of sleep was not reached.     Hyperventilation was not performed.     Photic stimulation produced no changes in the recording.    EEG CLASSIFICATION:   Findings: Abnormal.     Generalized background slowing polymorphic theta and delta.    INTERPRETATION:   No seizure sequences occurred.     This is an abnormal EEG indicating moderate diffuse cerebral dysfunction.      IMPRESSION:  PAST MEDICAL & SURGICAL HISTORY:  Cardiac disease  DM (diabetes mellitus)  HTN (hypertension)  H/O cervical spine surgery   p/w     IMP: This is a 57 yr old woman with prior mentioned medical found unresponsive due to hypoglycemia in rehab center. According to NH staff .. seems like pat took her own meds and also was given meds NH staff.  She was intubated for airway protection. Mental  status improved. CXR demonstrated possible new b/l infiltrate due to asp pna . Pat is sedated for vent synchrony.  Neuro eval noted .. eeg/brain mri. ID f/u noted , antibx changed. Pulmo and cards noted. neuro f/u noted . EEG neg        Plan:            CNS: no sedation    PULMONARY: no issue    CARDIAC: continue meds    GI: feeds    RENAL: no iissue    SKIN: no issue    MUSCULOSKELETAL: PT and OOB to chair    ID: antibx as per ID    HEME: no issue    DVT and GI Prophylaxis     GOALS OF CARE DISCUSSION WITH PATIENT/FAMILY/PROXY/icu team on rounds    CRITICAL CARE TIME SPENT

## 2018-09-13 NOTE — PROGRESS NOTE ADULT - ASSESSMENT
58 y/o female from [facility] rehab with PMH of HTN, DM and ?cardiac disease (family not sure about exact diagnosis) and PSH of recent C spine surgery (1 month back at [place] for ?nerve compression) sent to ED for unresponsiveness. Likely secondary to hypoglycemia. Was intubated in ED and admitted to ICU on 9/9/10 and extubated on 9/11.

## 2018-09-13 NOTE — PHYSICAL THERAPY INITIAL EVALUATION ADULT - LIVES WITH, PROFILE
in a private house with stairs to negotiate; recently came from [facility] where she was receiving skilled rehabilitation services s/p cervical spine surgery/spouse/children

## 2018-09-13 NOTE — PHYSICAL THERAPY INITIAL EVALUATION ADULT - PERTINENT HX OF CURRENT PROBLEM, REHAB EVAL
unresponsiveness; s/p intubation/extubation; h/o cervical spine surgery; rehab in [facility] facility

## 2018-09-13 NOTE — PROGRESS NOTE ADULT - SUBJECTIVE AND OBJECTIVE BOX
Patient is seen and examined at the bed side, is afebrile. She is s/p Extubated today, saturating well on nasal canula.  The Leukocytosis is trending down.      REVIEW OF SYSTEMS: Unable to obtain since intubated and not responsive      ALLERGIES: NKDA      ICU Vital Signs Last 24 Hrs  T(C): 37.1 (12 Sep 2018 20:45), Max: 37.1 (12 Sep 2018 20:45)  T(F): 98.8 (12 Sep 2018 20:45), Max: 98.8 (12 Sep 2018 20:45)  HR: 92 (13 Sep 2018 14:00) (73 - 119)  BP: 104/58 (13 Sep 2018 14:00) (71/39 - 136/87)  BP(mean): 70 (13 Sep 2018 14:00) (46 - 99)  ABP: --  ABP(mean): --  RR: 31 (13 Sep 2018 14:00) (13 - 31)  SpO2: 99% (13 Sep 2018 14:00) (90% - 99%)        PHYSICAL EXAM:  GENERAL: Not in distress  CVS: s1 and s2 present  RESP: Air entry B/L  GI: Abdomen non distended and Nontender  EXT: No pedal edema   CNS: Awake and Alert        LABS:                        12.1   9.4   )-----------( 267      ( 13 Sep 2018 06:43 )             36.8                           12.1   11.4  )-----------( 242      ( 12 Sep 2018 06:12 )             36.9                                 11.6   15.0  )-----------( 227      ( 10 Sep 2018 06:13 )             35.6         13    139  |  103  |  9   ----------------------------<  181<H>  3.1<L>   |  27  |  0.57    Ca    8.5      13 Sep 2018 06:43  Phos  2.3       Mg     1.8         TPro  6.8  /  Alb  2.4<L>  /  TBili  0.6  /  DBili  x   /  AST  10  /  ALT  20  /  AlkPhos  61  -12    09-12    140  |  104  |  6<L>  ----------------------------<  173<H>  3.5   |  26  |  0.51    Ca    8.5      12 Sep 2018 06:12  Phos  2.0       Mg     1.8         TPro  6.8  /  Alb  2.4<L>  /  TBili  0.6  /  DBili  x   /  AST  10  /  ALT  20  /  AlkPhos  61          Color: Yellow / Appearance: Clear / S.010 / pH: x  Gluc: x / Ketone: Trace  / Bili: Negative / Urobili: Negative   Blood: x / Protein: 100 / Nitrite: Negative   Leuk Esterase: Trace / RBC: 0-2 /HPF / WBC 0-2 /HPF   Sq Epi: x / Non Sq Epi: Few /HPF / Bacteria: Negative /HPF      CAPILLARY BLOOD GLUCOSE      POCT Blood Glucose.: 166 mg/dL (09 Sep 2018 17:59)  POCT Blood Glucose.: 257 mg/dL (09 Sep 2018 13:45)  POCT Blood Glucose.: 301 mg/dL (09 Sep 2018 12:04)  POCT Blood Glucose.: 289 mg/dL (09 Sep 2018 06:24)  POCT Blood Glucose.: 198 mg/dL (08 Sep 2018 23:46)  POCT Blood Glucose.: 168 mg/dL (08 Sep 2018 22:29)  POCT Blood Glucose.: 163 mg/dL (08 Sep 2018 20:12)  POCT Blood Glucose.: 142 mg/dL (08 Sep 2018 18:59)    ABG - ( 09 Sep 2018 04:43 )  pH, Arterial: 7.29  pH, Blood: x     /  pCO2: 40    /  pO2: 108   / HCO3: 19    / Base Excess: -6.7  /  SaO2: 98            MEDICATIONS  (STANDING):  artificial  tears Solution 1 Drop(s) Both EYES two times a day  cefepime   IVPB      cefepime   IVPB 2000 milliGRAM(s) IV Intermittent every 8 hours  ferrous    sulfate Liquid 300 milliGRAM(s) Oral daily  insulin glargine Injectable (LANTUS) 14 Unit(s) SubCutaneous at bedtime  insulin lispro (HumaLOG) corrective regimen sliding scale   SubCutaneous Before meals and at bedtime  insulin lispro Injectable (HumaLOG) 3 Unit(s) SubCutaneous three times a day before meals  lactulose Syrup 10 Gram(s) Oral two times a day  pantoprazole  Injectable 40 milliGRAM(s) IV Push two times a day  simvastatin 20 milliGRAM(s) Oral at bedtime    MEDICATIONS  (PRN):  acetaminophen    Suspension .. 650 milliGRAM(s) Oral every 6 hours PRN Temp greater or equal to 38C (100.4F), Mild Pain (1 - 3)        RADIOLOGY & ADDITIONAL TESTS:    9/10/18: Xray Chest 1 View- PORTABLE-Routine (09.10.18 @ 06:59) ET and enteric tubes, and right IJ catheter remain in place. No pneumothorax. Small layering left pleural effusion has increased. Small right pleural effusion is unchanged. Mild pulmonary vascular congestion and scattered  lung opacities are similar to the prior study.    18 : Xray Chest 1 View- PORTABLE-Routine (18 @ 10:01) Mild pulmonary venous congestion, increased since the prior study. No pleural effusions . Hazy opacities in the lower lobes could   represent atelectasis or infiltrates.      18 : CT Abdomen and Pelvis No Cont (18 @ 03:24) No retroperitoneal hemorrhage. No bowel obstruction. Extensive bilateral lower lobe consolidation. Differential diagnosis   includes multifocal pneumonia, aspiration pneumonia, alveolar hemorrhage. Mild pulmonary venous congestion.      18 : CT Head No Cont (18 @ 07:55) There is no evidence of hydrocephalus, mass effect or any sizable extra-axial collection. Due to motion artifact, subtle abnormalities       MICROBIOLOGY DATA:    MRSA/MSSA PCR (09.10.18 @ 10:01)    MRSA PCR Result.: NotDetec: MRSA/MSSA PCR assay is a qualitative in vitro diagnostic test for the  direct detection and differentiation of methicillin-resistant  Staphylococcus aureus (MRSA) from Staphylococcus aureus (SA). The assay  detects DNA from nasal swabs in patients atrisk for nasal colonization.  It is not intended to diagnose MRSA or SA infections nor guide or monitor  treatment for MRSA/SA infections. A negative result does not preclude  nasal colonization. The assay is FDA-approved and its performance has  been established by [employer], [place] and the [facility], [place], [place].    Staph Aureus PCR Result: Detected    Culture - Sputum . (09.10.18 @ 09:54)    Gram Stain:   No polymorphonuclear leukocytes per low power field  No Squamous epithelial cells per low power field  No organisms seen per oil power field    Specimen Source: .Sputum Sputum, trap    Culture Results:   No growth to date.    Rapid Respiratory Viral Panel (18 @ 18:24)    Rapid RVP Result: NotDete: The FilmArray RVP Rapid uses polymerase chain reaction (PCR) and melt  curve analysis to screen for adenovirus; coronavirus HKU1, NL63, 229E,  OC43; human metapneumovirus (hMPV); human enterovirus/rhinovirus  (Entero/RV); influenza A; influenza A/H1;influenza A/H3; influenza  A/H1-2009; influenza B; parainfluenza viruses 1, 2, 3, 4; respiratory  syncytial virus; Bordetella pertussis; Mycoplasma pneumoniae; and  Chlamydophila pneumoniae.      Culture - Blood (18 @ 11:37)    Specimen Source: .Blood Blood-Peripheral    Culture Results:   No growth to date. Patient is seen and examined at the bed side, is afebrile. She remains Extubated,  and no new complaints.  The Leukocytosis trended down to normal.      REVIEW OF SYSTEMS: Unable to obtain since intubated and not responsive      ALLERGIES: NKDA      ICU Vital Signs Last 24 Hrs  T(C): 37.1 (12 Sep 2018 20:45), Max: 37.1 (12 Sep 2018 20:45)  T(F): 98.8 (12 Sep 2018 20:45), Max: 98.8 (12 Sep 2018 20:45)  HR: 92 (13 Sep 2018 14:00) (73 - 119)  BP: 104/58 (13 Sep 2018 14:00) (71/39 - 136/87)  BP(mean): 70 (13 Sep 2018 14:00) (46 - 99)  ABP: --  ABP(mean): --  RR: 31 (13 Sep 2018 14:00) (13 - 31)  SpO2: 99% (13 Sep 2018 14:00) (90% - 99%)        PHYSICAL EXAM:  GENERAL: Not in distress  CVS: s1 and s2 present  RESP: Air entry B/L  GI: Abdomen non distended and Nontender  EXT: No pedal edema   CNS: Awake and Alert        LABS:                        12.1   9.4   )-----------( 267      ( 13 Sep 2018 06:43 )             36.8                           12.1   11.4  )-----------( 242      ( 12 Sep 2018 06:12 )             36.9                                 11.6   15.0  )-----------( 227      ( 10 Sep 2018 06:13 )             35.6         13    139  |  103  |  9   ----------------------------<  181<H>  3.1<L>   |  27  |  0.57    Ca    8.5      13 Sep 2018 06:43  Phos  2.3       Mg     1.8         TPro  6.8  /  Alb  2.4<L>  /  TBili  0.6  /  DBili  x   /  AST  10  /  ALT  20  /  AlkPhos  61  -12    09-12    140  |  104  |  6<L>  ----------------------------<  173<H>  3.5   |  26  |  0.51    Ca    8.5      12 Sep 2018 06:12  Phos  2.0       Mg     1.8         TPro  6.8  /  Alb  2.4<L>  /  TBili  0.6  /  DBili  x   /  AST  10  /  ALT  20  /  AlkPhos  61          Color: Yellow / Appearance: Clear / S.010 / pH: x  Gluc: x / Ketone: Trace  / Bili: Negative / Urobili: Negative   Blood: x / Protein: 100 / Nitrite: Negative   Leuk Esterase: Trace / RBC: 0-2 /HPF / WBC 0-2 /HPF   Sq Epi: x / Non Sq Epi: Few /HPF / Bacteria: Negative /HPF      CAPILLARY BLOOD GLUCOSE      POCT Blood Glucose.: 166 mg/dL (09 Sep 2018 17:59)  POCT Blood Glucose.: 257 mg/dL (09 Sep 2018 13:45)  POCT Blood Glucose.: 301 mg/dL (09 Sep 2018 12:04)  POCT Blood Glucose.: 289 mg/dL (09 Sep 2018 06:24)  POCT Blood Glucose.: 198 mg/dL (08 Sep 2018 23:46)  POCT Blood Glucose.: 168 mg/dL (08 Sep 2018 22:29)  POCT Blood Glucose.: 163 mg/dL (08 Sep 2018 20:12)  POCT Blood Glucose.: 142 mg/dL (08 Sep 2018 18:59)    ABG - ( 09 Sep 2018 04:43 )  pH, Arterial: 7.29  pH, Blood: x     /  pCO2: 40    /  pO2: 108   / HCO3: 19    / Base Excess: -6.7  /  SaO2: 98            MEDICATIONS  (STANDING):  artificial  tears Solution 1 Drop(s) Both EYES two times a day  cefepime   IVPB      cefepime   IVPB 2000 milliGRAM(s) IV Intermittent every 8 hours  ferrous    sulfate Liquid 300 milliGRAM(s) Oral daily  insulin glargine Injectable (LANTUS) 14 Unit(s) SubCutaneous at bedtime  insulin lispro (HumaLOG) corrective regimen sliding scale   SubCutaneous Before meals and at bedtime  insulin lispro Injectable (HumaLOG) 3 Unit(s) SubCutaneous three times a day before meals  lactulose Syrup 10 Gram(s) Oral two times a day  pantoprazole  Injectable 40 milliGRAM(s) IV Push two times a day  simvastatin 20 milliGRAM(s) Oral at bedtime    MEDICATIONS  (PRN):  acetaminophen    Suspension .. 650 milliGRAM(s) Oral every 6 hours PRN Temp greater or equal to 38C (100.4F), Mild Pain (1 - 3)        RADIOLOGY & ADDITIONAL TESTS:    9/10/18: Xray Chest 1 View- PORTABLE-Routine (09.10.18 @ 06:59) ET and enteric tubes, and right IJ catheter remain in place. No pneumothorax. Small layering left pleural effusion has increased. Small right pleural effusion is unchanged. Mild pulmonary vascular congestion and scattered  lung opacities are similar to the prior study.    18 : Xray Chest 1 View- PORTABLE-Routine (18 @ 10:01) Mild pulmonary venous congestion, increased since the prior study. No pleural effusions . Hazy opacities in the lower lobes could   represent atelectasis or infiltrates.      18 : CT Abdomen and Pelvis No Cont (18 @ 03:24) No retroperitoneal hemorrhage. No bowel obstruction. Extensive bilateral lower lobe consolidation. Differential diagnosis   includes multifocal pneumonia, aspiration pneumonia, alveolar hemorrhage. Mild pulmonary venous congestion.      18 : CT Head No Cont (18 @ 07:55) There is no evidence of hydrocephalus, mass effect or any sizable extra-axial collection. Due to motion artifact, subtle abnormalities       MICROBIOLOGY DATA:    MRSA/MSSA PCR (09.10.18 @ 10:01)    MRSA PCR Result.: NotDetec: MRSA/MSSA PCR assay is a qualitative in vitro diagnostic test for the  direct detection and differentiation of methicillin-resistant  Staphylococcus aureus (MRSA) from Staphylococcus aureus (SA). The assay  detects DNA from nasal swabs in patients atrisk for nasal colonization.  It is not intended to diagnose MRSA or SA infections nor guide or monitor  treatment for MRSA/SA infections. A negative result does not preclude  nasal colonization. The assay is FDA-approved and its performance has  been established by [employer], [place] and the [facility], [place], [place].    Staph Aureus PCR Result: Detected    Culture - Sputum . (09.10.18 @ 09:54)    Gram Stain:   No polymorphonuclear leukocytes per low power field  No Squamous epithelial cells per low power field  No organisms seen per oil power field    Specimen Source: .Sputum Sputum, trap    Culture Results:   No growth to date.    Rapid Respiratory Viral Panel (18 @ 18:24)    Rapid RVP Result: NotDete: The FilmArray RVP Rapid uses polymerase chain reaction (PCR) and melt  curve analysis to screen for adenovirus; coronavirus HKU1, NL63, 229E,  OC43; human metapneumovirus (hMPV); human enterovirus/rhinovirus  (Entero/RV); influenza A; influenza A/H1;influenza A/H3; influenza  A/H1-2009; influenza B; parainfluenza viruses 1, 2, 3, 4; respiratory  syncytial virus; Bordetella pertussis; Mycoplasma pneumoniae; and  Chlamydophila pneumoniae.      Culture - Blood (18 @ 11:37)    Specimen Source: .Blood Blood-Peripheral    Culture Results:   No growth to date.

## 2018-09-13 NOTE — PROGRESS NOTE ADULT - SUBJECTIVE AND OBJECTIVE BOX
INTERVAL HPI/OVERNIGHT EVENTS: *** Patient was seen and examined at bed side. Patient's blood pressure was in 70s this am. patient is awake and alert. Received 1 L NS bolus. Bp is improved to 104/60. Patient's failed trial of void. Sanchez was inserted and 400cc was out put.     PRESSORS: [ ] YES [x ] NO  WHICH:    Antimicrobial:  cefepime   IVPB      cefepime   IVPB 2000 milliGRAM(s) IV Intermittent every 8 hours 9/9/18  Cardiovascular:    Pulmonary:    Hematalogic:    Other:  acetaminophen    Suspension .. 650 milliGRAM(s) Oral every 6 hours PRN  artificial  tears Solution 1 Drop(s) Both EYES two times a day  ferrous    sulfate Liquid 300 milliGRAM(s) Oral daily  insulin glargine Injectable (LANTUS) 14 Unit(s) SubCutaneous at bedtime  insulin lispro (HumaLOG) corrective regimen sliding scale   SubCutaneous Before meals and at bedtime  insulin lispro Injectable (HumaLOG) 3 Unit(s) SubCutaneous three times a day before meals  lactulose Syrup 10 Gram(s) Oral two times a day  pantoprazole  Injectable 40 milliGRAM(s) IV Push two times a day  potassium chloride   Powder 40 milliEquivalent(s) Oral every 2 hours  simvastatin 20 milliGRAM(s) Oral at bedtime    acetaminophen    Suspension .. 650 milliGRAM(s) Oral every 6 hours PRN  artificial  tears Solution 1 Drop(s) Both EYES two times a day  cefepime   IVPB      cefepime   IVPB 2000 milliGRAM(s) IV Intermittent every 8 hours  ferrous    sulfate Liquid 300 milliGRAM(s) Oral daily  insulin glargine Injectable (LANTUS) 14 Unit(s) SubCutaneous at bedtime  insulin lispro (HumaLOG) corrective regimen sliding scale   SubCutaneous Before meals and at bedtime  insulin lispro Injectable (HumaLOG) 3 Unit(s) SubCutaneous three times a day before meals  lactulose Syrup 10 Gram(s) Oral two times a day  pantoprazole  Injectable 40 milliGRAM(s) IV Push two times a day  potassium chloride   Powder 40 milliEquivalent(s) Oral every 2 hours  simvastatin 20 milliGRAM(s) Oral at bedtime    Drug Dosing Weight    Weight (kg): 66.6 (08 Sep 2018 11:22)      SANCHEZ: [x ] YES [ ] NO    DATE INSERTED: 9/13  REMOVE:  [ ] YES [ ] NO  EXPLAIN:          ICU Vital Signs Last 24 Hrs  T(C): 37.1 (12 Sep 2018 20:45), Max: 37.3 (12 Sep 2018 12:00)  T(F): 98.8 (12 Sep 2018 20:45), Max: 99.1 (12 Sep 2018 12:00)  HR: 82 (13 Sep 2018 08:30) (73 - 124)  BP: 92/60 (13 Sep 2018 08:30) (71/39 - 148/82)  BP(mean): 67 (13 Sep 2018 08:30) (46 - 108)  ABP: --  ABP(mean): --  RR: 19 (13 Sep 2018 08:30) (13 - 29)  SpO2: 95% (13 Sep 2018 08:30) (90% - 100%)            09-12 @ 07:01  -  09-13 @ 07:00  --------------------------------------------------------  IN: 615 mL / OUT: 1088 mL / NET: -473 mL            PHYSICAL EXAM:    GENERAL:NAD, well-groomed, well-developed  HEAD:  Atraumatic, Normocephalic  EYES: EOMI, PERRLA, conjunctiva and sclera clear  ENMT: No tonsillar erythema, exudates, or enlargement; Moist mucous membranes, Good dentition, No lesions  NECK: Supple, normal appearance, No JVD; Normal thyroid; Trachea midline  NERVOUS SYSTEM: Alert & Oriented X3, Good concentration  CHEST/LUNG: No chest deformity;Normal percussion bilaterally; No rales, rhonchi, wheezing   HEART: Regular rate and rhythm; No murmurs, rubs, or gallops  ABDOMEN: Soft, Nontender, Nondistended; Bowel sounds present  EXTREMITIES: 2+ Peripheral Pulses, No clubbing, cyanosis, or edema  LYMPH: No lymphadenopathy noted  SKIN:No rashes or lesions; Good capillary refill      LABS:  CBC Full  -  ( 13 Sep 2018 06:43 )  WBC Count : 9.4 K/uL  Hemoglobin : 12.1 g/dL  Hematocrit : 36.8 %  Platelet Count - Automated : 267 K/uL  Mean Cell Volume : 85.7 fl  Mean Cell Hemoglobin : 28.2 pg  Mean Cell Hemoglobin Concentration : 32.9 gm/dL  Auto Neutrophil # : x  Auto Lymphocyte # : x  Auto Monocyte # : x  Auto Eosinophil # : x  Auto Basophil # : x  Auto Neutrophil % : x  Auto Lymphocyte % : x  Auto Monocyte % : x  Auto Eosinophil % : x  Auto Basophil % : x    09-13    139  |  103  |  9   ----------------------------<  181<H>  3.1<L>   |  27  |  0.57    Ca    8.5      13 Sep 2018 06:43  Phos  2.3     09-13  Mg     1.8     09-13    TPro  6.8  /  Alb  2.4<L>  /  TBili  0.6  /  DBili  x   /  AST  10  /  ALT  20  /  AlkPhos  61  09-12            RADIOLOGY & ADDITIONAL STUDIES REVIEWED:  ***    [ x]GOALS OF CARE DISCUSSION WITH PATIENT/FAMILY/PROXY: full code     CRITICAL CARE TIME SPENT: 35 minutes INTERVAL HPI/OVERNIGHT EVENTS: *** Patient was seen and examined at bed side. Patient's blood pressure was in 70s this am. patient is awake and alert. Received 1 L NS bolus. Bp is improved to 104/60. Patient's failed trial of void. Sanchez was inserted and 400cc was out put.     PRESSORS: [ ] YES [x ] NO  WHICH:    Antimicrobial:  cefepime   IVPB      cefepime   IVPB 2000 milliGRAM(s) IV Intermittent every 8 hours 9/9/18  Cardiovascular:    Pulmonary:    Hematalogic:    Other:  acetaminophen    Suspension .. 650 milliGRAM(s) Oral every 6 hours PRN  artificial  tears Solution 1 Drop(s) Both EYES two times a day  ferrous    sulfate Liquid 300 milliGRAM(s) Oral daily  insulin glargine Injectable (LANTUS) 14 Unit(s) SubCutaneous at bedtime  insulin lispro (HumaLOG) corrective regimen sliding scale   SubCutaneous Before meals and at bedtime  insulin lispro Injectable (HumaLOG) 3 Unit(s) SubCutaneous three times a day before meals  lactulose Syrup 10 Gram(s) Oral two times a day  pantoprazole  Injectable 40 milliGRAM(s) IV Push two times a day  potassium chloride   Powder 40 milliEquivalent(s) Oral every 2 hours  simvastatin 20 milliGRAM(s) Oral at bedtime    acetaminophen    Suspension .. 650 milliGRAM(s) Oral every 6 hours PRN  artificial  tears Solution 1 Drop(s) Both EYES two times a day  cefepime   IVPB      cefepime   IVPB 2000 milliGRAM(s) IV Intermittent every 8 hours  ferrous    sulfate Liquid 300 milliGRAM(s) Oral daily  insulin glargine Injectable (LANTUS) 14 Unit(s) SubCutaneous at bedtime  insulin lispro (HumaLOG) corrective regimen sliding scale   SubCutaneous Before meals and at bedtime  insulin lispro Injectable (HumaLOG) 3 Unit(s) SubCutaneous three times a day before meals  lactulose Syrup 10 Gram(s) Oral two times a day  pantoprazole  Injectable 40 milliGRAM(s) IV Push two times a day  potassium chloride   Powder 40 milliEquivalent(s) Oral every 2 hours  simvastatin 20 milliGRAM(s) Oral at bedtime    Drug Dosing Weight    Weight (kg): 66.6 (08 Sep 2018 11:22)      SANCHEZ: [x ] YES [ ] NO    DATE INSERTED: 9/13  REMOVE:  [ ] YES [ ] NO  EXPLAIN:          ICU Vital Signs Last 24 Hrs  T(C): 37.1 (12 Sep 2018 20:45), Max: 37.3 (12 Sep 2018 12:00)  T(F): 98.8 (12 Sep 2018 20:45), Max: 99.1 (12 Sep 2018 12:00)  HR: 82 (13 Sep 2018 08:30) (73 - 124)  BP: 92/60 (13 Sep 2018 08:30) (71/39 - 148/82)  BP(mean): 67 (13 Sep 2018 08:30) (46 - 108)  ABP: --  ABP(mean): --  RR: 19 (13 Sep 2018 08:30) (13 - 29)  SpO2: 95% (13 Sep 2018 08:30) (90% - 100%)            09-12 @ 07:01  -  09-13 @ 07:00  --------------------------------------------------------  IN: 615 mL / OUT: 1088 mL / NET: -473 mL            PHYSICAL EXAM:    GENERAL:NAD, well-groomed, well-developed  HEAD:  Atraumatic, Normocephalic  EYES: EOMI, PERRLA, conjunctiva and sclera clear  ENMT: No tonsillar erythema, exudates, or enlargement; Moist mucous membranes, Good dentition, No lesions  NECK: Supple, normal appearance, No JVD; Normal thyroid; Trachea midline  NERVOUS SYSTEM: Alert & Oriented X3, Good concentration  CHEST/LUNG: No chest deformity;Normal percussion bilaterally; No rales, rhonchi, wheezing   HEART: Regular rate and rhythm; No murmurs, rubs, or gallops  ABDOMEN: Soft, Nontender, Nondistended; Bowel sounds present  EXTREMITIES: 2+ Peripheral Pulses, No clubbing, cyanosis, or edema  LYMPH: No lymphadenopathy noted  SKIN:No rashes or lesions; Good capillary refill      LABS:  CBC Full  -  ( 13 Sep 2018 06:43 )  WBC Count : 9.4 K/uL  Hemoglobin : 12.1 g/dL  Hematocrit : 36.8 %  Platelet Count - Automated : 267 K/uL  Mean Cell Volume : 85.7 fl  Mean Cell Hemoglobin : 28.2 pg  Mean Cell Hemoglobin Concentration : 32.9 gm/dL  Auto Neutrophil # : x  Auto Lymphocyte # : x  Auto Monocyte # : x  Auto Eosinophil # : x  Auto Basophil # : x  Auto Neutrophil % : x  Auto Lymphocyte % : x  Auto Monocyte % : x  Auto Eosinophil % : x  Auto Basophil % : x    09-13    139  |  103  |  9   ----------------------------<  181<H>  3.1<L>   |  27  |  0.57    Ca    8.5      13 Sep 2018 06:43  Phos  2.3     09-13  Mg     1.8     09-13    TPro  6.8  /  Alb  2.4<L>  /  TBili  0.6  /  DBili  x   /  AST  10  /  ALT  20  /  AlkPhos  61  09-12            RADIOLOGY & ADDITIONAL STUDIES REVIEWED:  *** EEG 9/12/18    INTERPRETATION:   No seizure sequences occurred.     This is an abnormal EEG indicating moderate diffuse cerebral dysfunction.        [ x]GOALS OF CARE DISCUSSION WITH PATIENT/FAMILY/PROXY: full code     CRITICAL CARE TIME SPENT: 35 minutes

## 2018-09-13 NOTE — PHYSICAL THERAPY INITIAL EVALUATION ADULT - ACTIVE RANGE OF MOTION EXAMINATION, REHAB EVAL
both shoulders limited due to weakness/bilateral upper extremity Active ROM was WFL (within functional limits)/bilateral  lower extremity Active ROM was WFL (within functional limits)

## 2018-09-13 NOTE — PROGRESS NOTE ADULT - ASSESSMENT
Ac Respiratory Failure sec to encephalopathy ? drug induced like Valium, percocet s/p hypoglycemia -- resolved  Hcvd with CHF/ cardiomyopathy  DM  Spine surgery      Plan;  Lisinopril, coreg, Lasix and   Neutraphos  s/C Lovenox  D/C cefipime if ok with Id  KCL  FS coverage  OOB / BRP  D/c jensen  P/T  I will be away , ICu team will follow

## 2018-09-13 NOTE — PHYSICAL THERAPY INITIAL EVALUATION ADULT - PRECAUTIONS/LIMITATIONS, REHAB EVAL
no documentation; patient denies use of cervical support or collar s/p cervical spine surgery/no known precautions/limitations

## 2018-09-14 LAB
ALBUMIN SERPL ELPH-MCNC: 2.3 G/DL — LOW (ref 3.5–5)
ALP SERPL-CCNC: 58 U/L — SIGNIFICANT CHANGE UP (ref 40–120)
ALT FLD-CCNC: 17 U/L DA — SIGNIFICANT CHANGE UP (ref 10–60)
ANION GAP SERPL CALC-SCNC: 9 MMOL/L — SIGNIFICANT CHANGE UP (ref 5–17)
AST SERPL-CCNC: 13 U/L — SIGNIFICANT CHANGE UP (ref 10–40)
BASOPHILS # BLD AUTO: 0 K/UL — SIGNIFICANT CHANGE UP (ref 0–0.2)
BASOPHILS NFR BLD AUTO: 0.6 % — SIGNIFICANT CHANGE UP (ref 0–2)
BILIRUB SERPL-MCNC: 0.4 MG/DL — SIGNIFICANT CHANGE UP (ref 0.2–1.2)
BUN SERPL-MCNC: 9 MG/DL — SIGNIFICANT CHANGE UP (ref 7–18)
CALCIUM SERPL-MCNC: 8.6 MG/DL — SIGNIFICANT CHANGE UP (ref 8.4–10.5)
CHLORIDE SERPL-SCNC: 107 MMOL/L — SIGNIFICANT CHANGE UP (ref 96–108)
CO2 SERPL-SCNC: 24 MMOL/L — SIGNIFICANT CHANGE UP (ref 22–31)
CREAT SERPL-MCNC: 0.61 MG/DL — SIGNIFICANT CHANGE UP (ref 0.5–1.3)
CULTURE RESULTS: SIGNIFICANT CHANGE UP
EOSINOPHIL # BLD AUTO: 0.4 K/UL — SIGNIFICANT CHANGE UP (ref 0–0.5)
EOSINOPHIL NFR BLD AUTO: 4.2 % — SIGNIFICANT CHANGE UP (ref 0–6)
GLUCOSE BLDC GLUCOMTR-MCNC: 177 MG/DL — HIGH (ref 70–99)
GLUCOSE BLDC GLUCOMTR-MCNC: 185 MG/DL — HIGH (ref 70–99)
GLUCOSE BLDC GLUCOMTR-MCNC: 196 MG/DL — HIGH (ref 70–99)
GLUCOSE BLDC GLUCOMTR-MCNC: 203 MG/DL — HIGH (ref 70–99)
GLUCOSE BLDC GLUCOMTR-MCNC: 205 MG/DL — HIGH (ref 70–99)
GLUCOSE SERPL-MCNC: 191 MG/DL — HIGH (ref 70–99)
HCT VFR BLD CALC: 36.1 % — SIGNIFICANT CHANGE UP (ref 34.5–45)
HGB BLD-MCNC: 11.8 G/DL — SIGNIFICANT CHANGE UP (ref 11.5–15.5)
LYMPHOCYTES # BLD AUTO: 2.1 K/UL — SIGNIFICANT CHANGE UP (ref 1–3.3)
LYMPHOCYTES # BLD AUTO: 24.4 % — SIGNIFICANT CHANGE UP (ref 13–44)
MAGNESIUM SERPL-MCNC: 1.7 MG/DL — SIGNIFICANT CHANGE UP (ref 1.6–2.6)
MCHC RBC-ENTMCNC: 28.3 PG — SIGNIFICANT CHANGE UP (ref 27–34)
MCHC RBC-ENTMCNC: 32.6 GM/DL — SIGNIFICANT CHANGE UP (ref 32–36)
MCV RBC AUTO: 86.8 FL — SIGNIFICANT CHANGE UP (ref 80–100)
MONOCYTES # BLD AUTO: 0.6 K/UL — SIGNIFICANT CHANGE UP (ref 0–0.9)
MONOCYTES NFR BLD AUTO: 6.4 % — SIGNIFICANT CHANGE UP (ref 2–14)
NEUTROPHILS # BLD AUTO: 5.6 K/UL — SIGNIFICANT CHANGE UP (ref 1.8–7.4)
NEUTROPHILS NFR BLD AUTO: 64.4 % — SIGNIFICANT CHANGE UP (ref 43–77)
PHOSPHATE SERPL-MCNC: 2.2 MG/DL — LOW (ref 2.5–4.5)
PLATELET # BLD AUTO: 293 K/UL — SIGNIFICANT CHANGE UP (ref 150–400)
POTASSIUM SERPL-MCNC: 3.8 MMOL/L — SIGNIFICANT CHANGE UP (ref 3.5–5.3)
POTASSIUM SERPL-SCNC: 3.8 MMOL/L — SIGNIFICANT CHANGE UP (ref 3.5–5.3)
PROT SERPL-MCNC: 6.7 G/DL — SIGNIFICANT CHANGE UP (ref 6–8.3)
RBC # BLD: 4.16 M/UL — SIGNIFICANT CHANGE UP (ref 3.8–5.2)
RBC # FLD: 12.5 % — SIGNIFICANT CHANGE UP (ref 10.3–14.5)
SODIUM SERPL-SCNC: 140 MMOL/L — SIGNIFICANT CHANGE UP (ref 135–145)
SPECIMEN SOURCE: SIGNIFICANT CHANGE UP
WBC # BLD: 8.7 K/UL — SIGNIFICANT CHANGE UP (ref 3.8–10.5)
WBC # FLD AUTO: 8.7 K/UL — SIGNIFICANT CHANGE UP (ref 3.8–10.5)

## 2018-09-14 PROCEDURE — 99232 SBSQ HOSP IP/OBS MODERATE 35: CPT

## 2018-09-14 PROCEDURE — 71045 X-RAY EXAM CHEST 1 VIEW: CPT | Mod: 26

## 2018-09-14 RX ORDER — DIAZEPAM 5 MG
1 TABLET ORAL
Qty: 0 | Refills: 0 | COMMUNITY

## 2018-09-14 RX ORDER — INSULIN GLARGINE 100 [IU]/ML
18 INJECTION, SOLUTION SUBCUTANEOUS AT BEDTIME
Qty: 0 | Refills: 0 | Status: DISCONTINUED | OUTPATIENT
Start: 2018-09-14 | End: 2018-09-20

## 2018-09-14 RX ORDER — INSULIN LISPRO 100/ML
6 VIAL (ML) SUBCUTANEOUS
Qty: 0 | Refills: 0 | Status: DISCONTINUED | OUTPATIENT
Start: 2018-09-14 | End: 2018-09-19

## 2018-09-14 RX ORDER — METFORMIN HYDROCHLORIDE 850 MG/1
1 TABLET ORAL
Qty: 0 | Refills: 0 | COMMUNITY

## 2018-09-14 RX ORDER — DOCUSATE SODIUM 100 MG
3 CAPSULE ORAL
Qty: 0 | Refills: 0 | COMMUNITY

## 2018-09-14 RX ORDER — INSULIN LISPRO 100/ML
5 VIAL (ML) SUBCUTANEOUS
Qty: 0 | Refills: 0 | COMMUNITY

## 2018-09-14 RX ORDER — LOSARTAN POTASSIUM 100 MG/1
1 TABLET, FILM COATED ORAL
Qty: 0 | Refills: 0 | COMMUNITY

## 2018-09-14 RX ORDER — INSULIN GLARGINE 100 [IU]/ML
38 INJECTION, SOLUTION SUBCUTANEOUS
Qty: 0 | Refills: 0 | COMMUNITY

## 2018-09-14 RX ORDER — CARVEDILOL PHOSPHATE 80 MG/1
1 CAPSULE, EXTENDED RELEASE ORAL
Qty: 0 | Refills: 0 | COMMUNITY

## 2018-09-14 RX ORDER — INSULIN LISPRO 100/ML
8 VIAL (ML) SUBCUTANEOUS
Qty: 0 | Refills: 0 | Status: DISCONTINUED | OUTPATIENT
Start: 2018-09-14 | End: 2018-09-14

## 2018-09-14 RX ORDER — SENNA PLUS 8.6 MG/1
1 TABLET ORAL
Qty: 0 | Refills: 0 | COMMUNITY

## 2018-09-14 RX ORDER — ENOXAPARIN SODIUM 100 MG/ML
40 INJECTION SUBCUTANEOUS DAILY
Qty: 0 | Refills: 0 | Status: DISCONTINUED | OUTPATIENT
Start: 2018-09-14 | End: 2018-09-20

## 2018-09-14 RX ORDER — ASPIRIN/CALCIUM CARB/MAGNESIUM 324 MG
81 TABLET ORAL DAILY
Qty: 0 | Refills: 0 | Status: DISCONTINUED | OUTPATIENT
Start: 2018-09-14 | End: 2018-09-20

## 2018-09-14 RX ORDER — INSULIN GLARGINE 100 [IU]/ML
18 INJECTION, SOLUTION SUBCUTANEOUS
Qty: 0 | Refills: 0 | COMMUNITY

## 2018-09-14 RX ADMIN — LACTULOSE 10 GRAM(S): 10 SOLUTION ORAL at 06:41

## 2018-09-14 RX ADMIN — Medication 2: at 16:06

## 2018-09-14 RX ADMIN — PANTOPRAZOLE SODIUM 40 MILLIGRAM(S): 20 TABLET, DELAYED RELEASE ORAL at 17:16

## 2018-09-14 RX ADMIN — Medication 8 UNIT(S): at 11:14

## 2018-09-14 RX ADMIN — SIMVASTATIN 20 MILLIGRAM(S): 20 TABLET, FILM COATED ORAL at 21:16

## 2018-09-14 RX ADMIN — CEFEPIME 100 MILLIGRAM(S): 1 INJECTION, POWDER, FOR SOLUTION INTRAMUSCULAR; INTRAVENOUS at 14:12

## 2018-09-14 RX ADMIN — INSULIN GLARGINE 18 UNIT(S): 100 INJECTION, SOLUTION SUBCUTANEOUS at 21:15

## 2018-09-14 RX ADMIN — Medication 6 UNIT(S): at 16:06

## 2018-09-14 RX ADMIN — Medication 1 DROP(S): at 06:44

## 2018-09-14 RX ADMIN — Medication 300 MILLIGRAM(S): at 11:15

## 2018-09-14 RX ADMIN — Medication 2: at 21:40

## 2018-09-14 RX ADMIN — CEFEPIME 100 MILLIGRAM(S): 1 INJECTION, POWDER, FOR SOLUTION INTRAMUSCULAR; INTRAVENOUS at 06:44

## 2018-09-14 RX ADMIN — CEFEPIME 100 MILLIGRAM(S): 1 INJECTION, POWDER, FOR SOLUTION INTRAMUSCULAR; INTRAVENOUS at 21:15

## 2018-09-14 RX ADMIN — Medication 3 UNIT(S): at 06:42

## 2018-09-14 RX ADMIN — PANTOPRAZOLE SODIUM 40 MILLIGRAM(S): 20 TABLET, DELAYED RELEASE ORAL at 06:42

## 2018-09-14 RX ADMIN — Medication 81 MILLIGRAM(S): at 16:05

## 2018-09-14 RX ADMIN — Medication 4: at 11:15

## 2018-09-14 RX ADMIN — Medication 1 DROP(S): at 17:17

## 2018-09-14 RX ADMIN — LACTULOSE 10 GRAM(S): 10 SOLUTION ORAL at 17:16

## 2018-09-14 RX ADMIN — ENOXAPARIN SODIUM 40 MILLIGRAM(S): 100 INJECTION SUBCUTANEOUS at 11:15

## 2018-09-14 RX ADMIN — Medication 2: at 06:42

## 2018-09-14 NOTE — PROGRESS NOTE ADULT - PROBLEM SELECTOR PROBLEM 4
Unresponsiveness
Hyperammonemia
Unresponsiveness

## 2018-09-14 NOTE — PROGRESS NOTE ADULT - PROBLEM SELECTOR PLAN 7
replace k
-Family not sure about exact diagnosis  -c/w aspirin, statin  -Holding Losartan and HCTZ for now, restart as appropriate.  -echo-> hypokinetic left ventricular wall and apex, EF 40-45%, Grade IV DD
replace k
-Family not sure about exact diagnosis  -c/w aspirin, statin  -Holding Coreg, Losartan and HCTZ for now, restart as appropriate.  -f/u ECHO.
-Family not sure about exact diagnosis  -c/w aspirin, statin  -Holding Losartan and HCTZ for now, restart as appropriate.  -echo-> hypokinetic left ventricular wall and apex, EF 40-45%, Grade IV DD
-Hold home BP meds Coreg, Losartan and HCTZ for now as BP low normal, restart if needed
-Hold home BP meds Coreg, Losartan and HCTZ for now as BP low normal, restart if needed
-Family not sure about exact diagnosis  -c/w aspirin, statin  -Holding Losartan and HCTZ for now, restart as appropriate.  -echo-> hypokinetic left ventricular wall and apex, EF 40-45%, Grade IV DD

## 2018-09-14 NOTE — DISCHARGE NOTE ADULT - CARE PROVIDER_API CALL
Johanna Cormier), [facility]inology[facility]  [address] [place] [address]  Phone: [phone]  Fax: [phone]

## 2018-09-14 NOTE — PROGRESS NOTE ADULT - ASSESSMENT
Encephalopathy, toxic metabolic, likely due to hypoglycemia and sepsis with improvement of mental status off sedation    Recommend:  continue with treatment for sepsis as per primary team  MRI brain to r/o stroke

## 2018-09-14 NOTE — PROGRESS NOTE ADULT - SUBJECTIVE AND OBJECTIVE BOX
Patient is seen and examined at the bed side, is afebrile. She remains Extubated,  and no new complaints.  The Leukocytosis trended down to normal.      REVIEW OF SYSTEMS: Unable to obtain since intubated and not responsive      ALLERGIES: NKDA      ICU Vital Signs Last 24 Hrs  T(C): 36 (14 Sep 2018 16:00), Max: 37 (14 Sep 2018 04:36)  T(F): 96.8 (14 Sep 2018 16:00), Max: 98.6 (14 Sep 2018 04:36)  HR: 94 (14 Sep 2018 16:00) (83 - 106)  BP: 130/78 (14 Sep 2018 15:00) (90/72 - 133/122)  BP(mean): 89 (14 Sep 2018 15:00) (70 - 125)  ABP: --  ABP(mean): --  RR: 22 (14 Sep 2018 16:00) (17 - 40)  SpO2: 98% (14 Sep 2018 16:00) (92% - 99%)      PHYSICAL EXAM:  GENERAL: Not in distress  CVS: s1 and s2 present  RESP: Air entry B/L  GI: Abdomen non distended and Nontender  EXT: No pedal edema   CNS: Awake and Alert        LABS:                        11.8   8.7   )-----------( 293      ( 14 Sep 2018 07:47 )             36.1                           12.1   9.4   )-----------( 267      ( 13 Sep 2018 06:43 )             36.8                           12.1   11.4  )-----------( 242      ( 12 Sep 2018 06:12 )             36.9                                 11.6   15.0  )-----------( 227      ( 10 Sep 2018 06:13 )             35.6         09-14    140  |  107  |  9   ----------------------------<  191<H>  3.8   |  24  |  0.61    Ca    8.6      14 Sep 2018 07:47  Phos  2.2     -14  Mg     1.7         TPro  6.7  /  Alb  2.3<L>  /  TBili  0.4  /  DBili  x   /  AST  13  /  ALT  17  /  AlkPhos  58  13    139  |  103  |  9   ----------------------------<  181<H>  3.1<L>   |  27  |  0.57    Ca    8.5      13 Sep 2018 06:43  Phos  2.3     09-13  Mg     1.8         TPro  6.8  /  Alb  2.4<L>  /  TBili  0.6  /  DBili  x   /  AST  10  /  ALT  20  /  AlkPhos  61      140  |  104  |  6<L>  ----------------------------<  173<H>  3.5   |  26  |  0.51    Ca    8.5      12 Sep 2018 06:12  Phos  2.0     -  Mg     1.8         TPro  6.8  /  Alb  2.4<L>  /  TBili  0.6  /  DBili  x   /  AST  10  /  ALT  20  /  AlkPhos  61          Color: Yellow / Appearance: Clear / S.010 / pH: x  Gluc: x / Ketone: Trace  / Bili: Negative / Urobili: Negative   Blood: x / Protein: 100 / Nitrite: Negative   Leuk Esterase: Trace / RBC: 0-2 /HPF / WBC 0-2 /HPF   Sq Epi: x / Non Sq Epi: Few /HPF / Bacteria: Negative /HPF      CAPILLARY BLOOD GLUCOSE      POCT Blood Glucose.: 166 mg/dL (09 Sep 2018 17:59)  POCT Blood Glucose.: 257 mg/dL (09 Sep 2018 13:45)  POCT Blood Glucose.: 301 mg/dL (09 Sep 2018 12:04)  POCT Blood Glucose.: 289 mg/dL (09 Sep 2018 06:24)  POCT Blood Glucose.: 198 mg/dL (08 Sep 2018 23:46)  POCT Blood Glucose.: 168 mg/dL (08 Sep 2018 22:29)  POCT Blood Glucose.: 163 mg/dL (08 Sep 2018 20:12)  POCT Blood Glucose.: 142 mg/dL (08 Sep 2018 18:59)    ABG - ( 09 Sep 2018 04:43 )  pH, Arterial: 7.29  pH, Blood: x     /  pCO2: 40    /  pO2: 108   / HCO3: 19    / Base Excess: -6.7  /  SaO2: 98            MEDICATIONS  (STANDING):  artificial  tears Solution 1 Drop(s) Both EYES two times a day  aspirin  chewable 81 milliGRAM(s) Oral daily  cefepime   IVPB      cefepime   IVPB 2000 milliGRAM(s) IV Intermittent every 8 hours  enoxaparin Injectable 40 milliGRAM(s) SubCutaneous daily  ferrous    sulfate Liquid 300 milliGRAM(s) Oral daily  insulin glargine Injectable (LANTUS) 18 Unit(s) SubCutaneous at bedtime  insulin lispro (HumaLOG) corrective regimen sliding scale   SubCutaneous Before meals and at bedtime  insulin lispro Injectable (HumaLOG) 6 Unit(s) SubCutaneous three times a day before meals  lactulose Syrup 10 Gram(s) Oral two times a day  pantoprazole  Injectable 40 milliGRAM(s) IV Push two times a day  simvastatin 20 milliGRAM(s) Oral at bedtime    MEDICATIONS  (PRN):  acetaminophen    Suspension .. 650 milliGRAM(s) Oral every 6 hours PRN Temp greater or equal to 38C (100.4F), Mild Pain (1 - 3)        RADIOLOGY & ADDITIONAL TESTS:    9/10/18: Xray Chest 1 View- PORTABLE-Routine (09.10.18 @ 06:59) ET and enteric tubes, and right IJ catheter remain in place. No pneumothorax. Small layering left pleural effusion has increased. Small right pleural effusion is unchanged. Mild pulmonary vascular congestion and scattered  lung opacities are similar to the prior study.    18 : Xray Chest 1 View- PORTABLE-Routine (18 @ 10:01) Mild pulmonary venous congestion, increased since the prior study. No pleural effusions . Hazy opacities in the lower lobes could   represent atelectasis or infiltrates.      18 : CT Abdomen and Pelvis No Cont (18 @ 03:24) No retroperitoneal hemorrhage. No bowel obstruction. Extensive bilateral lower lobe consolidation. Differential diagnosis   includes multifocal pneumonia, aspiration pneumonia, alveolar hemorrhage. Mild pulmonary venous congestion.      18 : CT Head No Cont (18 @ 07:55) There is no evidence of hydrocephalus, mass effect or any sizable extra-axial collection. Due to motion artifact, subtle abnormalities       MICROBIOLOGY DATA:    MRSA/MSSA PCR (09.10.18 @ 10:01)    MRSA PCR Result.: NotDetec: MRSA/MSSA PCR assay is a qualitative in vitro diagnostic test for the  direct detection and differentiation of methicillin-resistant  Staphylococcus aureus (MRSA) from Staphylococcus aureus (SA). The assay  detects DNA from nasal swabs in patients atrisk for nasal colonization.  It is not intended to diagnose MRSA or SA infections nor guide or monitor  treatment for MRSA/SA infections. A negative result does not preclude  nasal colonization. The assay is FDA-approved and its performance has  been established by [employer], [place] and the [facility], [place], [place].    Staph Aureus PCR Result: Detected    Culture - Sputum . (09.10.18 @ 09:54)    Gram Stain:   No polymorphonuclear leukocytes per low power field  No Squamous epithelial cells per low power field  No organisms seen per oil power field    Specimen Source: .Sputum Sputum, trap    Culture Results:   No growth to date.    Rapid Respiratory Viral Panel (18 @ 18:24)    Rapid RVP Result: NotDetec: The FilmArray RVP Rapid uses polymerase chain reaction (PCR) and melt  curve analysis to screen for adenovirus; coronavirus HKU1, NL63, 229E,  OC43; human metapneumovirus (hMPV); human enterovirus/rhinovirus  (Entero/RV); influenza A; influenza A/H1;influenza A/H3; influenza  A/H1-2009; influenza B; parainfluenza viruses 1, 2, 3, 4; respiratory  syncytial virus; Bordetella pertussis; Mycoplasma pneumoniae; and  Chlamydophila pneumoniae.      Culture - Blood (18 @ 11:37)    Specimen Source: .Blood Blood-Peripheral    Culture Results:   No growth to date. Patient is seen and examined at the bed side, is afebrile. She is doing better, no new complaints.  The WBC count stay normal.      REVIEW OF SYSTEMS: Unable to obtain since intubated and not responsive      ALLERGIES: NKDA      ICU Vital Signs Last 24 Hrs  T(C): 36 (14 Sep 2018 16:00), Max: 37 (14 Sep 2018 04:36)  T(F): 96.8 (14 Sep 2018 16:00), Max: 98.6 (14 Sep 2018 04:36)  HR: 94 (14 Sep 2018 16:00) (83 - 106)  BP: 130/78 (14 Sep 2018 15:00) (90/72 - 133/122)  BP(mean): 89 (14 Sep 2018 15:00) (70 - 125)  ABP: --  ABP(mean): --  RR: 22 (14 Sep 2018 16:00) (17 - 40)  SpO2: 98% (14 Sep 2018 16:00) (92% - 99%)      PHYSICAL EXAM:  GENERAL: Not in distress  CVS: s1 and s2 present  RESP: Air entry B/L  GI: Abdomen non distended and Nontender  EXT: No pedal edema   CNS: Awake and Alert        LABS:                        11.8   8.7   )-----------( 293      ( 14 Sep 2018 07:47 )             36.1                           12.1   9.4   )-----------( 267      ( 13 Sep 2018 06:43 )             36.8                        11.6   15.0  )-----------( 227      ( 10 Sep 2018 06:13 )             35.6         14    140  |  107  |  9   ----------------------------<  191<H>  3.8   |  24  |  0.61    Ca    8.6      14 Sep 2018 07:47  Phos  2.2       Mg     1.7         TPro  6.7  /  Alb  2.3<L>  /  TBili  0.4  /  DBili  x   /  AST  13  /  ALT  17  /  AlkPhos  58  -14    -13    139  |  103  |  9   ----------------------------<  181<H>  3.1<L>   |  27  |  0.57    Ca    8.5      13 Sep 2018 06:43  Phos  2.3       Mg     1.8         TPro  6.8  /  Alb  2.4<L>  /  TBili  0.6  /  DBili  x   /  AST  10  /  ALT  20  /  AlkPhos  61          Color: Yellow / Appearance: Clear / S.010 / pH: x  Gluc: x / Ketone: Trace  / Bili: Negative / Urobili: Negative   Blood: x / Protein: 100 / Nitrite: Negative   Leuk Esterase: Trace / RBC: 0-2 /HPF / WBC 0-2 /HPF   Sq Epi: x / Non Sq Epi: Few /HPF / Bacteria: Negative /HPF      CAPILLARY BLOOD GLUCOSE      POCT Blood Glucose.: 166 mg/dL (09 Sep 2018 17:59)  POCT Blood Glucose.: 257 mg/dL (09 Sep 2018 13:45)  POCT Blood Glucose.: 301 mg/dL (09 Sep 2018 12:04)  POCT Blood Glucose.: 289 mg/dL (09 Sep 2018 06:24)  POCT Blood Glucose.: 198 mg/dL (08 Sep 2018 23:46)  POCT Blood Glucose.: 168 mg/dL (08 Sep 2018 22:29)  POCT Blood Glucose.: 163 mg/dL (08 Sep 2018 20:12)  POCT Blood Glucose.: 142 mg/dL (08 Sep 2018 18:59)    ABG - ( 09 Sep 2018 04:43 )  pH, Arterial: 7.29  pH, Blood: x     /  pCO2: 40    /  pO2: 108   / HCO3: 19    / Base Excess: -6.7  /  SaO2: 98            MEDICATIONS  (STANDING):  artificial  tears Solution 1 Drop(s) Both EYES two times a day  aspirin  chewable 81 milliGRAM(s) Oral daily  cefepime   IVPB      cefepime   IVPB 2000 milliGRAM(s) IV Intermittent every 8 hours  enoxaparin Injectable 40 milliGRAM(s) SubCutaneous daily  ferrous    sulfate Liquid 300 milliGRAM(s) Oral daily  insulin glargine Injectable (LANTUS) 18 Unit(s) SubCutaneous at bedtime  insulin lispro (HumaLOG) corrective regimen sliding scale   SubCutaneous Before meals and at bedtime  insulin lispro Injectable (HumaLOG) 6 Unit(s) SubCutaneous three times a day before meals  lactulose Syrup 10 Gram(s) Oral two times a day  pantoprazole  Injectable 40 milliGRAM(s) IV Push two times a day  simvastatin 20 milliGRAM(s) Oral at bedtime    MEDICATIONS  (PRN):  acetaminophen    Suspension .. 650 milliGRAM(s) Oral every 6 hours PRN Temp greater or equal to 38C (100.4F), Mild Pain (1 - 3)        RADIOLOGY & ADDITIONAL TESTS:    9/10/18: Xray Chest 1 View- PORTABLE-Routine (09.10.18 @ 06:59) ET and enteric tubes, and right IJ catheter remain in place. No pneumothorax. Small layering left pleural effusion has increased. Small right pleural effusion is unchanged. Mild pulmonary vascular congestion and scattered  lung opacities are similar to the prior study.    18 : Xray Chest 1 View- PORTABLE-Routine (18 @ 10:01) Mild pulmonary venous congestion, increased since the prior study. No pleural effusions . Hazy opacities in the lower lobes could   represent atelectasis or infiltrates.      18 : CT Abdomen and Pelvis No Cont (18 @ 03:24) No retroperitoneal hemorrhage. No bowel obstruction. Extensive bilateral lower lobe consolidation. Differential diagnosis   includes multifocal pneumonia, aspiration pneumonia, alveolar hemorrhage. Mild pulmonary venous congestion.      18 : CT Head No Cont (18 @ 07:55) There is no evidence of hydrocephalus, mass effect or any sizable extra-axial collection. Due to motion artifact, subtle abnormalities       MICROBIOLOGY DATA:    MRSA/MSSA PCR (09.10.18 @ 10:01)    MRSA PCR Result.: NotDetec: MRSA/MSSA PCR assay is a qualitative in vitro diagnostic test for the  direct detection and differentiation of methicillin-resistant  Staphylococcus aureus (MRSA) from Staphylococcus aureus (SA). The assay  detects DNA from nasal swabs in patients atrisk for nasal colonization.  It is not intended to diagnose MRSA or SA infections nor guide or monitor  treatment for MRSA/SA infections. A negative result does not preclude  nasal colonization. The assay is FDA-approved and its performance has  been established by [employer], [place] and the [facility], [place], [place].    Staph Aureus PCR Result: Detected    Culture - Sputum . (09.10.18 @ 09:54)    Gram Stain:   No polymorphonuclear leukocytes per low power field  No Squamous epithelial cells per low power field  No organisms seen per oil power field    Specimen Source: .Sputum Sputum, trap    Culture Results:   No growth to date.    Rapid Respiratory Viral Panel (18 @ 18:24)    Rapid RVP Result: NotDete: The FilmArray RVP Rapid uses polymerase chain reaction (PCR) and melt  curve analysis to screen for adenovirus; coronavirus HKU1, NL63, 229E,  OC43; human metapneumovirus (hMPV); human enterovirus/rhinovirus  (Entero/RV); influenza A; influenza A/H1;influenza A/H3; influenza  A/H1-2009; influenza B; parainfluenza viruses 1, 2, 3, 4; respiratory  syncytial virus; Bordetella pertussis; Mycoplasma pneumoniae; and  Chlamydophila pneumoniae.      Culture - Blood (18 @ 11:37)    Specimen Source: .Blood Blood-Peripheral    Culture Results:   No growth to date.

## 2018-09-14 NOTE — PROGRESS NOTE ADULT - PROBLEM SELECTOR PLAN 1
Resolved  secondary to PN   afebrile in 24 hours   -blood culture NTD  CT abd: B/l lower lobe consolidation   c/w Abx. will switch cefepime to PO on discharge to rehab  Stable to DG/ DC to rehab   CM to follow

## 2018-09-14 NOTE — PROGRESS NOTE ADULT - PROBLEM SELECTOR PLAN 5
-Home meds includes Basaglar 38 units at bedtime, Humalog 5 units pre meals and correctional scale   -on HSS and lantus 18 units and added Humalog 8 TID pre meal   accu checks AC and HS  -Hba1c is 7.6  endocrine Dr evangelista following **

## 2018-09-14 NOTE — PROGRESS NOTE ADULT - SUBJECTIVE AND OBJECTIVE BOX
INTERVAL HPI/OVERNIGHT EVENTS: *** no events over night        Antimicrobial:  cefepime   IVPB      cefepime   IVPB 2000 milliGRAM(s) IV Intermittent every 8 hours till 9/18    Cardiovascular:    Pulmonary:    Hematalogic:  enoxaparin Injectable 40 milliGRAM(s) SubCutaneous daily    Other:  acetaminophen    Suspension .. 650 milliGRAM(s) Oral every 6 hours PRN  artificial  tears Solution 1 Drop(s) Both EYES two times a day  ferrous    sulfate Liquid 300 milliGRAM(s) Oral daily  insulin glargine Injectable (LANTUS) 18 Unit(s) SubCutaneous at bedtime  insulin lispro (HumaLOG) corrective regimen sliding scale   SubCutaneous Before meals and at bedtime  insulin lispro Injectable (HumaLOG) 8 Unit(s) SubCutaneous three times a day before meals  lactulose Syrup 10 Gram(s) Oral two times a day  pantoprazole  Injectable 40 milliGRAM(s) IV Push two times a day  simvastatin 20 milliGRAM(s) Oral at bedtime    acetaminophen    Suspension .. 650 milliGRAM(s) Oral every 6 hours PRN  artificial  tears Solution 1 Drop(s) Both EYES two times a day  cefepime   IVPB      cefepime   IVPB 2000 milliGRAM(s) IV Intermittent every 8 hours  enoxaparin Injectable 40 milliGRAM(s) SubCutaneous daily  ferrous    sulfate Liquid 300 milliGRAM(s) Oral daily  insulin glargine Injectable (LANTUS) 18 Unit(s) SubCutaneous at bedtime  insulin lispro (HumaLOG) corrective regimen sliding scale   SubCutaneous Before meals and at bedtime  insulin lispro Injectable (HumaLOG) 8 Unit(s) SubCutaneous three times a day before meals  lactulose Syrup 10 Gram(s) Oral two times a day  pantoprazole  Injectable 40 milliGRAM(s) IV Push two times a day  simvastatin 20 milliGRAM(s) Oral at bedtime    Drug Dosing Weight    Weight (kg): 66.6 (08 Sep 2018 11:22)            ICU Vital Signs Last 24 Hrs  T(C): 36.9 (14 Sep 2018 08:00), Max: 37 (14 Sep 2018 04:36)  T(F): 98.5 (14 Sep 2018 08:00), Max: 98.6 (14 Sep 2018 04:36)  HR: 94 (14 Sep 2018 11:00) (83 - 101)  BP: 106/93 (14 Sep 2018 11:00) (85/47 - 129/76)  BP(mean): 96 (14 Sep 2018 11:00) (56 - 96)  ABP: --  ABP(mean): --  RR: 40 (14 Sep 2018 11:00) (18 - 40)  SpO2: 98% (14 Sep 2018 11:00) (92% - 99%)            09-13 @ 07:01  -  09-14 @ 07:00  --------------------------------------------------------  IN: 1100 mL / OUT: 1835 mL / NET: -735 mL            PHYSICAL EXAM:    GENERAL:NAD, well-groomed, well-developed  HEAD:  Atraumatic, Normocephalic  EYES: EOMI, PERRLA, conjunctiva and sclera clear  ENMT: No tonsillar erythema, exudates, or enlargement; Moist mucous membranes, Good dentition, No lesions  NECK: Supple, normal appearance, No JVD; Normal thyroid; Trachea midline. neck pain   NERVOUS SYSTEM: Alert & Oriented X3, Good concentration; Motor Strength 5/5 B/L upper and lower extremities; DTRs 2+ intact and symmetric  CHEST/LUNG: No chest deformity;Normal percussion bilaterally; No rales, rhonchi, wheezing   HEART: Regular rate and rhythm; No murmurs, rubs, or gallops  ABDOMEN: Soft, Nontender, Nondistended; Bowel sounds present  EXTREMITIES: 2+ Peripheral Pulses, No clubbing, cyanosis, or edema  LYMPH: No lymphadenopathy noted  SKIN:No rashes or lesions; Good capillary refill      LABS:  CBC Full  -  ( 14 Sep 2018 07:47 )  WBC Count : 8.7 K/uL  Hemoglobin : 11.8 g/dL  Hematocrit : 36.1 %  Platelet Count - Automated : 293 K/uL  Mean Cell Volume : 86.8 fl  Mean Cell Hemoglobin : 28.3 pg  Mean Cell Hemoglobin Concentration : 32.6 gm/dL  Auto Neutrophil # : 5.6 K/uL  Auto Lymphocyte # : 2.1 K/uL  Auto Monocyte # : 0.6 K/uL  Auto Eosinophil # : 0.4 K/uL  Auto Basophil # : 0.0 K/uL  Auto Neutrophil % : 64.4 %  Auto Lymphocyte % : 24.4 %  Auto Monocyte % : 6.4 %  Auto Eosinophil % : 4.2 %  Auto Basophil % : 0.6 %    09-14    140  |  107  |  9   ----------------------------<  191<H>  3.8   |  24  |  0.61    Ca    8.6      14 Sep 2018 07:47  Phos  2.2     09-14  Mg     1.7     09-14    TPro  6.7  /  Alb  2.3<L>  /  TBili  0.4  /  DBili  x   /  AST  13  /  ALT  17  /  AlkPhos  58  09-14            RADIOLOGY & ADDITIONAL STUDIES REVIEWED:  ***    GOALS OF CARE DISCUSSION WITH PATIENT/FAMILY/PROXY: full code/DNR/DNI    CRITICAL CARE TIME SPENT: 35 minutes

## 2018-09-14 NOTE — DISCHARGE NOTE ADULT - CARE PLAN
Principal Discharge DX:	Acute respiratory failure  Goal:	prevent reoccurrence  Assessment and plan of treatment:	Patient was admitted for hypoxic respiratory failure secondary to pneumonia. patient was intubated and was later successfully extubated. Patient is being treated for pneumonia with IV antibiotics, which are now changed to oral till 9/18/2018. Please continue to take augmentin 875mg two times a day for 4 more days.  Secondary Diagnosis:	Pneumonia  Assessment and plan of treatment:	Patient is being treated for pneumonia with IV antibiotics, which are now changed to oral till 9/18/2018. Please continue to take augmentin 875mg two times a day for 4 more days. blood culture negative to date.  Secondary Diagnosis:	Encephalopathy acute  Assessment and plan of treatment:	Patient was presented for unresponsiveness and was intubated for protection of air way. patient was encephalopathic likely secondary to septic shock or metabolic encephalopathy due to hypoglycemia. patient's encephalopathy is now resolved. Patient's mental status is back to base line.  Secondary Diagnosis:	Hypoglycemia  Assessment and plan of treatment:	Patient had blood glucose of 40 mg/dl. patient took over dose of insulin. patient was evaluated by endocrinologist here who has readjusted her insulin regime. Please take 18 units of lantus at bed time and humalog 6 units three times a day before meal. Please avoid metformin. Please follow up with endocrinologist for continue to better control your diabetes  Secondary Diagnosis:	Septic shock  Assessment and plan of treatment:	Patient was admitted for septic shock secondary to bilateral pneumonia. Patient was given Intravenous fluids and vasopressors (medication to raise blood pressure) . Patient's condition improved. patient was taken off vasopressor as her sepsis resolved and is now maintaing her blood pressure well. patient's blood pressure medications are being held due to normotensive blood pressure. Please resume blood pressure medication as needed one by one.  Secondary Diagnosis:	HTN (hypertension)  Assessment and plan of treatment:	patient's blood pressure medications are being held due to normotensive blood pressure. Please resume blood pressure medication as needed one by one.  Secondary Diagnosis:	Cardiac disease  Assessment and plan of treatment:	Patient's echocardiogram shows hypokinetic left ventricular wall and apex, EF 40-45%, Grade IV DD. Unknown if old or new. continue to aspirin. patient can benefit from ace inhibitor. slowly resume blood pressure medications if needed. Patient's cardiac work up for acute coronary syndrome is negative. Principal Discharge DX:	Acute respiratory failure  Goal:	prevent reoccurrence  Assessment and plan of treatment:	Patient was admitted for hypoxic respiratory failure secondary to pneumonia. patient was intubated and was later successfully extubated. Patient is being treated for pneumonia with IV antibiotics, which are now changed to oral till 9/18/2018. Please continue to take augmentin 875mg two times a day for 2 more days.  Secondary Diagnosis:	Pneumonia  Assessment and plan of treatment:	Patient is being treated for pneumonia with IV antibiotics, which are now changed to oral till 9/18/2018. Please continue to take augmentin 875mg two times a day for 2 more days. blood culture negative to date.  Secondary Diagnosis:	Encephalopathy acute  Assessment and plan of treatment:	Patient was presented for unresponsiveness and was intubated for protection of air way. patient was encephalopathic likely secondary to septic shock or metabolic encephalopathy due to hypoglycemia. patient's encephalopathy is now resolved. Patient's mental status is back to base line.  Secondary Diagnosis:	Hypoglycemia  Assessment and plan of treatment:	Patient had blood glucose of 40 mg/dl. patient took over dose of insulin. patient was evaluated by endocrinologist here who has readjusted her insulin regime. Please take 18 units of lantus at bed time and humalog 6 units three times a day before meal. Please avoid metformin. Please follow up with endocrinologist for continue to better control your diabetes  Secondary Diagnosis:	Septic shock  Assessment and plan of treatment:	Patient was admitted for septic shock secondary to bilateral pneumonia. Patient was given Intravenous fluids and vasopressors (medication to raise blood pressure) . Patient's condition improved. patient was taken off vasopressor as her sepsis resolved and is now maintaing her blood pressure well. patient's blood pressure medications are being held due to normotensive blood pressure. Please resume blood pressure medication as needed one by one.  Secondary Diagnosis:	HTN (hypertension)  Assessment and plan of treatment:	patient's blood pressure medications are being held due to normotensive blood pressure. Please resume blood pressure medication as needed one by one.  Secondary Diagnosis:	Cardiac disease  Assessment and plan of treatment:	Patient's echocardiogram shows hypokinetic left ventricular wall and apex, EF 40-45%, Grade IV DD. Unknown if old or new. continue to aspirin. patient can benefit from ace inhibitor. slowly resume blood pressure medications if needed. Patient's cardiac work up for acute coronary syndrome is negative. Principal Discharge DX:	Acute respiratory failure  Goal:	prevent reoccurrence  Assessment and plan of treatment:	Patient was admitted for hypoxic respiratory failure secondary to pneumonia. patient was intubated and was later successfully extubated. Patient is being treated for pneumonia with IV antibiotics which the patient completed the course in the hospital  Secondary Diagnosis:	Pneumonia  Assessment and plan of treatment:	Patient is being treated for pneumonia with IV antibiotics which have completed treatment in the hospital  Secondary Diagnosis:	Encephalopathy acute  Assessment and plan of treatment:	Patient was presented for unresponsiveness and was intubated for protection of air way. patient was encephalopathic likely secondary to septic shock or metabolic encephalopathy due to hypoglycemia. patient's encephalopathy is now resolved. Patient's mental status is back to base line.  Secondary Diagnosis:	Hypoglycemia  Assessment and plan of treatment:	Patient had blood glucose of 40 mg/dl. patient took over dose of insulin. patient was evaluated by endocrinologist here who has readjusted her insulin regime. Please take 18 units of lantus at bed time and humalog 6 units three times a day before meal. Please avoid metformin. Please follow up with endocrinologist for continue to better control your diabetes  Secondary Diagnosis:	Septic shock  Assessment and plan of treatment:	Patient was admitted for septic shock secondary to bilateral pneumonia. Patient was given Intravenous fluids and vasopressors (medication to raise blood pressure) . Patient's condition improved. patient was taken off vasopressor as her sepsis resolved and is now maintaing her blood pressure well. patient's blood pressure medications are being held due to normotensive blood pressure. Please resume blood pressure medication as needed one by one.  Secondary Diagnosis:	HTN (hypertension)  Assessment and plan of treatment:	patient's blood pressure medications are being held due to normotensive blood pressure. Please resume blood pressure medication as needed one by one.  Secondary Diagnosis:	Cardiac disease  Assessment and plan of treatment:	Patient's echocardiogram shows hypokinetic left ventricular wall and apex, EF 40-45%, Grade IV DD. Unknown if old or new. continue to aspirin. patient can benefit from ace inhibitor. slowly resume blood pressure medications if needed. Patient's cardiac work up for acute coronary syndrome is negative. Principal Discharge DX:	Acute respiratory failure  Goal:	prevent reoccurrence  Assessment and plan of treatment:	Patient was admitted for hypoxic respiratory failure secondary to pneumonia. patient was intubated and was later successfully extubated. Patient is being treated for pneumonia with IV antibiotics which the patient completed the course in the hospital  Secondary Diagnosis:	Pneumonia  Assessment and plan of treatment:	Patient is being treated for pneumonia with IV antibiotics which have completed treatment in the hospital  Secondary Diagnosis:	Encephalopathy acute  Assessment and plan of treatment:	Patient was presented for unresponsiveness and was intubated for protection of air way. patient was encephalopathic likely secondary to septic shock or metabolic encephalopathy due to hypoglycemia. patient's encephalopathy is now resolved. Patient's mental status is back to base line.  Secondary Diagnosis:	Hypoglycemia  Assessment and plan of treatment:	Patient had blood glucose of 40 mg/dl. patient took over dose of insulin. patient was evaluated by endocrinologist here who has readjusted her insulin regime. Please take 18 units of lantus at bed time and humalog 8 units three times a day before meal. Please avoid metformin. Please follow up with endocrinologist for continue to better control your diabetes  Secondary Diagnosis:	Septic shock  Assessment and plan of treatment:	Patient was admitted for septic shock secondary to bilateral pneumonia. Patient was given Intravenous fluids and vasopressors (medication to raise blood pressure) . Patient's condition improved. patient was taken off vasopressor as her sepsis resolved and is now maintaing her blood pressure well. patient's blood pressure medications are being held due to normotensive blood pressure. Please resume blood pressure medication as needed one by one.  Secondary Diagnosis:	HTN (hypertension)  Assessment and plan of treatment:	patient's blood pressure medications are being held due to normotensive blood pressure. Please resume blood pressure medication as needed one by one.  Secondary Diagnosis:	Cardiac disease  Assessment and plan of treatment:	Patient's echocardiogram shows hypokinetic left ventricular wall and apex, EF 40-45%, Grade IV DD. Unknown if old or new. continue to aspirin. patient can benefit from ace inhibitor. slowly resume blood pressure medications if needed. Patient's cardiac work up for acute coronary syndrome is negative.

## 2018-09-14 NOTE — PROGRESS NOTE ADULT - PROBLEM SELECTOR PROBLEM 7
Hypokalemia
Cardiac disease
Hypokalemia
Cardiac disease
Cardiac disease
HTN (hypertension)
HTN (hypertension)
Cardiac disease

## 2018-09-14 NOTE — PROGRESS NOTE ADULT - SUBJECTIVE AND OBJECTIVE BOX
INTERVAL HPI/OVERNIGHT EVENTS:       Antimicrobial:  cefepime   IVPB      cefepime   IVPB 2000 milliGRAM(s) IV Intermittent every 8 hours    Cardiovascular:    Pulmonary:    Hematalogic:  enoxaparin Injectable 40 milliGRAM(s) SubCutaneous daily    Other:  acetaminophen    Suspension .. 650 milliGRAM(s) Oral every 6 hours PRN  artificial  tears Solution 1 Drop(s) Both EYES two times a day  ferrous    sulfate Liquid 300 milliGRAM(s) Oral daily  insulin glargine Injectable (LANTUS) 18 Unit(s) SubCutaneous at bedtime  insulin lispro (HumaLOG) corrective regimen sliding scale   SubCutaneous Before meals and at bedtime  insulin lispro Injectable (HumaLOG) 6 Unit(s) SubCutaneous three times a day before meals  lactulose Syrup 10 Gram(s) Oral two times a day  pantoprazole  Injectable 40 milliGRAM(s) IV Push two times a day  simvastatin 20 milliGRAM(s) Oral at bedtime      Drug Dosing Weight    Weight (kg): 66.6 (08 Sep 2018 11:22)    CENTRAL LINE: [ ] YES [ ] NO  LOCATION:   DATE INSERTED:    SANCHEZ: [ ] YES [ ] NO    DATE INSERTED:    A-LINE:  [ ] YES [ ] NO  LOCATION:   DATE INSERTED:    PMH/Social Hx/Fam Hx -reviewed admission note, no change since admission  PAST MEDICAL & SURGICAL HISTORY:  Cardiac disease  DM (diabetes mellitus)  HTN (hypertension)  H/O cervical spine surgery      T(C): 36.9 (09-14-18 @ 08:00), Max: 37 (09-14-18 @ 04:36)  HR: 106 (09-14-18 @ 13:00)  BP: 133/122 (09-14-18 @ 13:00)  BP(mean): 125 (09-14-18 @ 13:00)  ABP: --  ABP(mean): --  RR: 23 (09-14-18 @ 13:00)  SpO2: 98% (09-14-18 @ 13:00)  Wt(kg): --          09-13 @ 07:01  -  09-14 @ 07:00  --------------------------------------------------------  IN: 1100 mL / OUT: 1835 mL / NET: -735 mL            PHYSICAL EXAM:    GENERAL: No signs of distress, comfortable  HEAD: Atraumatic, Normocephalic  EYES: EOMI, PERRLA  ENMT: No erythema, exudates, or enlargement, Moist mucous membranes  NECK: Supple, normal appearance, No JVD; [  ] central line (if applicable)  CHEST/LUNG: No chest deformity, fair bilateral air entry; No rales, rhonchi, wheezing; crackles  HEART: Regular rate and rhythm; No murmurs, rubs, or gallops;   ABDOMEN: Soft, Nontender, Nondistended; Bowel sounds present  EXTREMITIES:  + Peripheral Pulses, No clubbing, cyanosis, or edema  NERVOUS SYSTEM: awake and alert x 2  LYMPH: No lymphadenopathy noted  SKIN: No rashes or lesions; good turgor, warm, dry      LABS:  CBC Full  -  ( 14 Sep 2018 07:47 )  WBC Count : 8.7 K/uL  Hemoglobin : 11.8 g/dL  Hematocrit : 36.1 %  Platelet Count - Automated : 293 K/uL  Mean Cell Volume : 86.8 fl  Mean Cell Hemoglobin : 28.3 pg  Mean Cell Hemoglobin Concentration : 32.6 gm/dL  Auto Neutrophil # : 5.6 K/uL  Auto Lymphocyte # : 2.1 K/uL  Auto Monocyte # : 0.6 K/uL  Auto Eosinophil # : 0.4 K/uL  Auto Basophil # : 0.0 K/uL  Auto Neutrophil % : 64.4 %  Auto Lymphocyte % : 24.4 %  Auto Monocyte % : 6.4 %  Auto Eosinophil % : 4.2 %  Auto Basophil % : 0.6 %    09-14    140  |  107  |  9   ----------------------------<  191<H>  3.8   |  24  |  0.61    Ca    8.6      14 Sep 2018 07:47  Phos  2.2     09-14  Mg     1.7     09-14    TPro  6.7  /  Alb  2.3<L>  /  TBili  0.4  /  DBili  x   /  AST  13  /  ALT  17  /  AlkPhos  58  09-14            RADIOLOGY & ADDITIONAL STUDIES REVIEWED         IMPRESSION:  PAST MEDICAL & SURGICAL HISTORY:  Cardiac disease  DM (diabetes mellitus)  HTN (hypertension)  H/O cervical spine surgery   p/w     IMP: This is a 57 yr old woman with prior mentioned medical found unresponsive due to hypoglycemia in rehab center. According to NH staff .. seems like pat took her own meds and also was given meds NH staff.  She was intubated for airway protection. Mental  status improved. CXR demonstrated possible new b/l infiltrate due to asp pna . Pat is sedated for vent synchrony.  Neuro eval noted .. eeg/brain mri. ID f/u noted , antibx changed. Pulmo and cards noted. neuro f/u noted . EEG neg.  Mental status improving. + MSSA on antibx      Plan:      CNS: no sedation    PULMONARY: no issue    CARDIAC: no issue    GI: feed    RENAL: no issue    SKIN: no issue    MUSCULOSKELETAL: Pt eval    ID: continue antibx. f/u ID for oral antibx if d/c to rehab    HEME: no issue    DVT and GI Prophylaxis    GOALS OF CARE DISCUSSION WITH PATIENT/FAMILY/PROXY

## 2018-09-14 NOTE — DISCHARGE NOTE ADULT - ADDITIONAL INSTRUCTIONS
follow up with PCP in 1 week  follow up with endocrinologist in 1 week  follow up with cardiologist in 1 week

## 2018-09-14 NOTE — PROGRESS NOTE ADULT - PROBLEM SELECTOR PROBLEM 6
Hypoglycemia
HTN (hypertension)
Hypoglycemia
DM (diabetes mellitus)
DM (diabetes mellitus)
HTN (hypertension)

## 2018-09-14 NOTE — PROGRESS NOTE ADULT - SUBJECTIVE AND OBJECTIVE BOX
JACQUELIN LINDER  [id number]    Patient is a 57y old  Female who presents with a chief complaint of unresponsiveness (08 Sep 2018 13:02)  patient seen and examined in icu      s   more awake .can responds to vocal command  .MEDICATIONS  (STANDING):  artificial  tears Solution 1 Drop(s) Both EYES two times a day  cefepime   IVPB      cefepime   IVPB 2000 milliGRAM(s) IV Intermittent every 8 hours  enoxaparin Injectable 40 milliGRAM(s) SubCutaneous daily  ferrous    sulfate Liquid 300 milliGRAM(s) Oral daily  insulin glargine Injectable (LANTUS) 18 Unit(s) SubCutaneous at bedtime  insulin lispro (HumaLOG) corrective regimen sliding scale   SubCutaneous Before meals and at bedtime  insulin lispro Injectable (HumaLOG) 8 Unit(s) SubCutaneous three times a day before meals  lactulose Syrup 10 Gram(s) Oral two times a day  pantoprazole  Injectable 40 milliGRAM(s) IV Push two times a day  simvastatin 20 milliGRAM(s) Oral at bedtime    MEDICATIONS  (PRN):  acetaminophen    Suspension .. 650 milliGRAM(s) Oral every 6 hours PRN Temp greater or equal to 38C (100.4F), Mild Pain (1 - 3)    Allergies    No Known Allergies    Intolerances        PAST MEDICAL & SURGICAL HISTORY:  Cardiac disease  DM (diabetes mellitus)  HTN (hypertension)  H/O cervical spine surgery      FAMILY HISTORY:  Family history unknown      SOCIAL HISTORY  Smoking History:     REVIEW OF SYSTEMS:    CONSTITUTIONAL:  Fevers [ ]  Yes  [x  ]  No                                   chills [  ]  Yes  [X  ]  No                               sweats  [  ]  Yes  [ X ]  No                                fatigue [x]  Yes  [ ]  No    HEENT:  Eyes:  Diplopia or blurred vision [  ]  Yes  [x  ]  No    ENT:                        earache  [  ]  Yes  [  x]  No                             sore throat  [  ]  Yes  [  x]  No                            runny nose.  [  ]  Yes  [  x]  No    CARDIOVASCULAR:       chest pain  [  ]  Yes  [x  ]  No                        squeezing, tightness,  [  ]  Yes  [  x]  No                                      palpitations.  [  ]  Yes  [  x]  No    RESPIRATORY:             Cough [  x]  Yes  [  ]  No                                  Wheezing [  ]  Yes  [  ]  No                                Hemoptysis [  ]  Yes  [  ]  No                                      Sputum [  ]  Yes  [  ]  No                   Shortness of Breathe [  ]  Yes  [ x ]  No    GASTROINTESTINAL:      Abdominal pain  [  ]  Yes  [ x ]  No                                                    Nausea  [  ]  Yes  [ x ]  No                                                   Vomiting [  ]  Yes  [ x ]  No                                              Constipation [  ]  Yes  [ x ]  No                                                    Diarrhea [  ]  Yes  [  x]  No    GENITOURINARY:           Incontinence [  x]  Yes  [  ]  No                                                Dysuria [  ]  Yes  [  ]  No                                      Blood in Urine  [  ]  Yes  [  ]  No                          Frequency or urgency.  [  ]  Yes  [  ]  No    NEUROLOGIC:                 Paresthesias  [  ]  Yes  [x  ]  No                                             fasciculations  [  ]  Yes  [x  ]  No                                seizures or weakness.  [  ]  Yes  [x  ]  No                                                   Headache [  ]  Yes  [xx  ]  No                                  Loss of Conciousness [  ]  Yes  [  ]  No                                                     Insomnia [  ]  Yes  [ x ]  No    PSYCHIATRIC:   awake   Disorder of thought or mood.  [  ]  Yes  [  ]  No                                                           Anxiety [  ]  Yes  [  ]  No                                                      Depression [  ]  Yes  [  ]  No                                                         Dementia [  ]  Yes  [  ]  No      .Vital Signs Last 24 Hrs  T(C): 37 (14 Sep 2018 04:36), Max: 37 (14 Sep 2018 04:36)  T(F): 98.6 (14 Sep 2018 04:36), Max: 98.6 (14 Sep 2018 04:36)  HR: 93 (14 Sep 2018 07:00) (78 - 99)  BP: 129/76 (14 Sep 2018 07:00) (71/39 - 129/76)  BP(mean): 89 (14 Sep 2018 07:00) (46 - 94)  RR: 26 (14 Sep 2018 07:00) (18 - 31)  SpO2: 94% (14 Sep 2018 07:00) (91% - 99%)  PHYSICAL EXAMINATION:    GENERAL: The patient is a well-developed, well-nourished _____in no apparent distress.     HEENT:  n perrla    NECK: Supple. jvd [  ]  Yes  [  X]  No    LUNGS: Clear to auscultation  [  ]  Yes  [  x  No                               wheezing, [  ]  Yes  [X  ]  No                                      rales  [  ]  Yes  [  ]X  No                                    rhonchi  [ x ]  Yes  [  ]  No                 Respirations labored  [  ]  Yes  [  ]  No    HEART: Regular rate and rhythm  [ X ]  Yes  [  ]  No                                        Murmur [  ]  Yes  [  X]  No                                              rub  [  ]  Yes  [ X ]  No                                          gallop  [  ]  Yes  [  ]X  No    ABDOMEN:                                 Soft, X[  ]  Yes  [  ]  No                                             nontender [X  ]  Yes  [  ]  No                                             distended.[  ]  Yes  [X  ]  No                            hepatosplenomegaly ,[  ]  Yes  [ X ]  No                                                    BS   [  ]  Yes  [  ]  No    EXTREMITIES:                cyanosis, [  ]  Yes  [ X ]  No                                       clubbing,   [  ]  Yes  [  X]  No                                               rash, [  ]  Yes  [ X ]  No                                           edema.  [  ]  Yes  [ X ]  No    NEUROLOGIC:   fully wake     LABS:           .      pending reports today                    LABS:                        12.1   9.4   )-----------( 267      ( 13 Sep 2018 06:43 )             36.8     09-13    139  |  103  |  9   ----------------------------<  181<H>  3.1<L>   |  27  |  0.57    Ca    8.5      13 Sep 2018 06:43  Phos  2.3     09-13  Mg     1.8     09-13    TPro  6.8  /  Alb  2.4<L>  /  TBili  0.6  /  DBili  x   /  AST  10  /  ALT  20  /  AlkPhos  61  09-12          CARDIAC MARKERS ( 12 Sep 2018 00:12 )  0.154 ng/mL / x     / 96 U/L / x     / <1.0 ng/mL  CARDIAC MARKERS ( 11 Sep 2018 16:16 )  0.206 ng/mL / x     / 102 U/L / x     / <1.0 ng/mL                    LABS:                        12.1   11.4  )-----------( 242      ( 12 Sep 2018 06:12 )             36.9     09-12    140  |  104  |  6<L>  ----------------------------<  173<H>  3.5   |  26  |  0.51    Ca    8.5      12 Sep 2018 06:12  Phos  2.0     09-12  Mg     1.8     09-12    TPro  6.8  /  Alb  2.4<L>  /  TBili  0.6  /  DBili  x   /  AST  10  /  ALT  20  /  AlkPhos  61  09-12          CARDIAC MARKERS ( 12 Sep 2018 00:12 )  0.154 ng/mL / x     / 96 U/L / x     / <1.0 ng/mL  CARDIAC MARKERS ( 11 Sep 2018 16:16 )  0.206 ng/mL / x     / 102 U/L / x     / <1.0 ng/mL                  LABS:                        11.1   11.9  )-----------( 214      ( 11 Sep 2018 06:28 )             34.0     09-11    137  |  108  |  6<L>  ----------------------------<  202<H>  4.0   |  20<L>  |  0.56    Ca    7.5<L>      11 Sep 2018 06:28  Phos  1.9     09-11  Mg     1.9     -11    TPro  5.9<L>  /  Alb  2.2<L>  /  TBili  0.4  /  DBili  x   /  AST  14  /  ALT  22  /  AlkPhos  57  -10        ABG - ( 10 Sep 2018 04:26 )  pH, Arterial: 7.35  pH, Blood: x     /  pCO2: 36    /  pO2: 119   / HCO3: 20    / Base Excess: -5.0  /  SaO2: 99                                LABS:                        11.6   15.0  )-----------( 227      ( 10 Sep 2018 06:13 )             35.6     09-10    140  |  111<H>  |  6<L>  ----------------------------<  142<H>  3.9   |  21<L>  |  0.58    Ca    7.5<L>      10 Sep 2018 06:13  Phos  1.1     -10  Mg     1.6     10    TPro  5.9<L>  /  Alb  2.2<L>  /  TBili  0.4  /  DBili  x   /  AST  14  /  ALT  22  /  AlkPhos  57  09-10    PT/INR - ( 09 Sep 2018 00:36 )   PT: 13.8 sec;   INR: 1.26 ratio           Urinalysis Basic - ( 08 Sep 2018 15:09 )    Color: Yellow / Appearance: Clear / S.010 / pH: x  Gluc: x / Ketone: Trace  / Bili: Negative / Urobili: Negative   Blood: x / Protein: 100 / Nitrite: Negative   Leuk Esterase: Trace / RBC: 0-2 /HPF / WBC 0-2 /HPF   Sq Epi: x / Non Sq Epi: Few /HPF / Bacteria: Negative /HPF      ABG - ( 10 Sep 2018 04:26 )  pH, Arterial: 7.35  pH, Blood: x     /  pCO2: 36    /  pO2: 119   / HCO3: 20    / Base Excess: -5.0  /  SaO2: 99                                LABS:                        11.4   13.4  )-----------( 219      ( 09 Sep 2018 09:38 )             33.7         141  |  112<H>  |  15  ----------------------------<  172<H>  3.4<L>   |  20<L>  |  0.74    Ca    6.7<L>      09 Sep 2018 00:36    TPro  8.3  /  Alb  3.7  /  TBili  <0.1<L>  /  DBili  x   /  AST  25  /  ALT  44  /  AlkPhos  85      PT/INR - ( 09 Sep 2018 00:36 )   PT: 13.8 sec;   INR: 1.26 ratio         PTT - ( 08 Sep 2018 08:27 )  PTT:34.2 sec  Urinalysis Basic - ( 08 Sep 2018 15:09 )    Color: Yellow / Appearance: Clear / S.010 / pH: x  Gluc: x / Ketone: Trace  / Bili: Negative / Urobili: Negative   Blood: x / Protein: 100 / Nitrite: Negative   Leuk Esterase: Trace / RBC: 0-2 /HPF / WBC 0-2 /HPF   Sq Epi: x / Non Sq Epi: Few /HPF / Bacteria: Negative /HPF      ABG - ( 09 Sep 2018 04:43 )  pH, Arterial: 7.29  pH, Blood: x     /  pCO2: 40    /  pO2: 108   / HCO3: 19    / Base Excess: -6.7  /  SaO2: 98                CARDIAC MARKERS ( 08 Sep 2018 08:27 )  <0.015 ng/mL / x     / x     / x     / x        < from: Xray Chest 1 View- PORTABLE-Routine (18 @ 10:01) >  IMPRESSION:  Mild pulmonary venous congestion.  .    < from: CT Abdomen and Pelvis No Cont (18 @ 03:24) >  IMPRESSION:  No retroperitoneal hemorrhage. No bowel obstruction.    Extensive bilateral lower lobe consolidation. Differential diagnosis   includes multifocal pneumonia, aspiration pneumonia, alveolar hemorrhage.    Mild pulmonary venous congestion.        < end of copied text >      Lactate, Blood: 1.3 mmol/L (18 @ 00:36)          LABS:                        11.6   11.3  )-----------( 329      ( 08 Sep 2018 08:27 )             35.5         136  |  104  |  20<H>  ----------------------------<  151<H>  4.6   |  23  |  0.77    Ca    8.9      08 Sep 2018 08:27    TPro  8.3  /  Alb  3.7  /  TBili  <0.1<L>  /  DBili  x   /  AST  25  /  ALT  44  /  AlkPhos  85      PT/INR - ( 08 Sep 2018 08:27 )   PT: 10.1 sec;   INR: 0.93 ratio         PTT - ( 08 Sep 2018 08:27 )  PTT:34.2 sec  Urinalysis Basic - ( 08 Sep 2018 15:09 )    Color: Yellow / Appearance: Clear / S.010 / pH: x  Gluc: x / Ketone: Trace  / Bili: Negative / Urobili: Negative   Blood: x / Protein: 100 / Nitrite: Negative   Leuk Esterase: Trace / RBC: 0-2 /HPF / WBC 0-2 /HPF   Sq Epi: x / Non Sq Epi: Few /HPF / Bacteria: Negative /HPF      ABG - ( 08 Sep 2018 10:08 )  pH, Arterial: 7.40  pH, Blood: x     /  pCO2: 36    /  pO2: 92    / HCO3: 22    / Base Excess: -1.7  /  SaO2: 97                CARDIAC MARKERS ( 08 Sep 2018 08:27 )  <0.015 ng/mL / x     / x     / x     / x              Lactate, Blood: 0.6 mmol/L (18 @ 08:27)        MICROBIOLOGY:    RADIOLOGY & ADDITIONAL STUDIES:    CXR:  < from: Xray Chest 1 View AP/PA (18 @ 10:06) >  Impression: Successful placement of ET tube and NG tube  Question of developing infiltrate at the right lung base.      < from: CT Head No Cont (18 @ 07:55) >  IMPRESSION:     Limited study demonstrates no obvious abnormality as described above.            < end of copied text >    < end of copied text >    Ct scan chest:    ekg;    echo:

## 2018-09-14 NOTE — DISCHARGE NOTE ADULT - HOSPITAL COURSE
HPI:  56 y/o female from [facility] rehab with PMH of HTN, DM and ?cardiac disease (family not sure about exact diagnosis) and PSH of recent C spine surgery (1 month back at [facility] for ?nerve compression) sent to ED for unresponsiveness. As per EMS, patient was thought to have low blood sugars so was treated for that without improvement in mental status. Fs in . CT head was unremarkable. CXR clear. Utox only positive for benzodiazepine ([patient is on Valium 5 mg q12). Patient was intubated by ED attending and admitted to ICU for further management.     Per rehab papers, patient was noted to have blood sugars of 49 last night at 9 pm, was given 2 mg of glucagon IM. AM fs at rehab was noted to be 73 and patient was minimally responsive so EMS was called. Spoke to Dr. Welch who takes care of her at rehab. Staff at rehab found patient's home medications at bedside, seems patient took her own Insulin on top of the Insulin given bu rehab staff , leading to hypoglycemia and unresponsiveness. Patient was last seen normal last night before dinner. (08 Sep 2018 11:48)    ICU course:  Patient was admitted for hypoxic respiratory failure secondary to pneumonia. patient was intubated and was later successfully extubated. Patient is being treated for pneumonia with IV antibiotics, which are now changed to oral till 9/18/2018. Patient was advised to continue to take augmentin 875mg two times a day for 4 more days. blood culture negative to date. Patient was presented for unresponsiveness and was intubated for protection of air way. patient was encephalopathic likely secondary to septic shock or metabolic encephalopathy due to hypoglycemia. Her encephalopathy is now resolved. Patient's mental status is back to base line. Patient had blood glucose of 40 mg/dl. patient took over dose of insulin. she was evaluated by endocrinologist here who has readjusted her insulin regime. Please take 18 units of lantus at bed time and humalog 6 units three times a day before meal. requested to avoid metformin and follow up with endocrinologist for continue to better control of her diabetes. Patient was admitted for septic shock secondary to bilateral pneumonia. Patient was given Intravenous fluids and vasopressors (medication to raise blood pressure) . Patient's condition improved. patient was taken off vasopressor as her sepsis resolved and is now maintaing her blood pressure well. patient's blood pressure medications are being held due to normotensive blood pressure. Please resume blood pressure medication as needed one by one. patient's blood pressure medications are being held due to normotensive blood pressure. Please resume blood pressure medication as needed one by one. Patient's echocardiogram shows hypokinetic left ventricular wall and apex, EF 40-45%, Grade IV DD. Unknown if old or new. continue to aspirin. patient can benefit from ace inhibitor. slowly resume blood pressure medications if needed. Patient's cardiac work up for acute coronary syndrome is negative.    Plan of care was discussed with patient and family. They understand and agree with the plan. patient will be discharged to sub acute rehab in stable condition.

## 2018-09-14 NOTE — PROGRESS NOTE ADULT - PROBLEM SELECTOR PLAN 8
IMPROVE VTE Individual Risk Assessment          RISK                                                          Points  [  ] Previous VTE                                                3  [  ] Thrombophilia                                             2  [  ] Lower limb paralysis                                   2        (unable to hold up >15 seconds)    [  ] Current Cancer                                             2         (within 6 months)  [ x ] Immobilization > 24 hrs                              1  [ x ] ICU/CCU stay > 24 hours                             1  [  ] Age > 60                                                         1    IMPROVE VTE Score:  2  Heparin sq for DVT ppx.
IMPROVE VTE Individual Risk Assessment          RISK                                                          Points  [  ] Previous VTE                                                3  [  ] Thrombophilia                                             2  [  ] Lower limb paralysis                                   2        (unable to hold up >15 seconds)    [  ] Current Cancer                                             2         (within 6 months)  [ x ] Immobilization > 24 hrs                              1  [ x ] ICU/CCU stay > 24 hours                             1  [  ] Age > 60                                                         1    IMPROVE VTE Score:  2  Heparin sq for DVT ppx.
-Family not sure about exact diagnosis  -c/w aspirin, statin  -Holding Coreg, Losartan and HCTZ for now, restart as appropriate.  -echo-> hypokinetic left ventricular wall and apex, EF 40-45%, Grade IV DD
-Family not sure about exact diagnosis  -c/w aspirin, statin  -Holding Coreg, Losartan and HCTZ for now, restart as appropriate.  -f/u ECHO.
IMPROVE VTE Individual Risk Assessment          RISK                                                          Points  [  ] Previous VTE                                                3  [  ] Thrombophilia                                             2  [  ] Lower limb paralysis                                   2        (unable to hold up >15 seconds)    [  ] Current Cancer                                             2         (within 6 months)  [ x ] Immobilization > 24 hrs                              1  [ x ] ICU/CCU stay > 24 hours                             1  [  ] Age > 60                                                         1    IMPROVE VTE Score:  2  Heparin sq for DVT ppx.
IMPROVE VTE Individual Risk Assessment          RISK                                                          Points  [  ] Previous VTE                                                3  [  ] Thrombophilia                                             2  [  ] Lower limb paralysis                                   2        (unable to hold up >15 seconds)    [  ] Current Cancer                                             2         (within 6 months)  [ x ] Immobilization > 24 hrs                              1  [ x ] ICU/CCU stay > 24 hours                             1  [  ] Age > 60                                                         1    IMPROVE VTE Score:  2  Heparin sq for DVT ppx.

## 2018-09-14 NOTE — PROGRESS NOTE ADULT - SUBJECTIVE AND OBJECTIVE BOX
Neurology Follow up note    Name  JACQUELIN LINDER    HPI:  56 y/o female from [facility] rehab with PMH of HTN, DM and ?cardiac disease (family not sure about exact diagnosis) and PSH of recent C spine surgery (1 month back at [place] for ?nerve compression) sent to ED for unresponsiveness. As per EMS, patient was thought to have low blood sugars so was treated for that without improvement in mental status. Fs in . CT head was unremarkable. CXR clear. Utox only positive for benzodiazepine ([patient is on Valium 5 mg q12). Patient was intubated by ED attending and admitted to ICU for further management.     Per rehab papers, patient was noted to have blood sugars of 49 last night at 9 pm, was given 2 mg of glucagon IM. AM fs at rehab was noted to be 73 and patient was minimally responsive so EMS was called. Spoke to Dr. Welch who takes care of her at rehab. Staff at rehab found patient's home medications at bedside, seems patient took her own Insulin on top of the Insulin given bu rehab staff , leading to hypoglycemia and unresponsiveness. Patient was last seen normal last night before dinner. (08 Sep 2018 11:48)      Interval History -        Subjective:    Review of Systems:  Constitutional:        Eyes, Ears, Mouth, Throat:   Respiratory:                            Cardiovascular:   Gastrointestinal:                                     Genitourinary:   Musculoskeletal:                                    Dermatologic:   Neurological: as per above                                                                 Psychiatric:   Endocrine:              Hematologic/Lymphatic:     MEDICATIONS  (STANDING):  artificial  tears Solution 1 Drop(s) Both EYES two times a day  cefepime   IVPB      cefepime   IVPB 2000 milliGRAM(s) IV Intermittent every 8 hours  enoxaparin Injectable 40 milliGRAM(s) SubCutaneous daily  ferrous    sulfate Liquid 300 milliGRAM(s) Oral daily  insulin glargine Injectable (LANTUS) 18 Unit(s) SubCutaneous at bedtime  insulin lispro (HumaLOG) corrective regimen sliding scale   SubCutaneous Before meals and at bedtime  insulin lispro Injectable (HumaLOG) 8 Unit(s) SubCutaneous three times a day before meals  lactulose Syrup 10 Gram(s) Oral two times a day  pantoprazole  Injectable 40 milliGRAM(s) IV Push two times a day  simvastatin 20 milliGRAM(s) Oral at bedtime    MEDICATIONS  (PRN):  acetaminophen    Suspension .. 650 milliGRAM(s) Oral every 6 hours PRN Temp greater or equal to 38C (100.4F), Mild Pain (1 - 3)      Allergies    No Known Allergies    Intolerances        Objective:   Vital Signs Last 24 Hrs  T(C): 36.9 (14 Sep 2018 08:00), Max: 37 (14 Sep 2018 04:36)  T(F): 98.5 (14 Sep 2018 08:00), Max: 98.6 (14 Sep 2018 04:36)  HR: 92 (14 Sep 2018 10:00) (83 - 101)  BP: 117/72 (14 Sep 2018 10:00) (85/47 - 129/76)  BP(mean): 82 (14 Sep 2018 10:00) (56 - 94)  RR: 23 (14 Sep 2018 10:00) (18 - 34)  SpO2: 98% (14 Sep 2018 10:00) (92% - 99%)    General Exam:   General appearance: No acute distress                 Cardiovascular: Pedal dorsalis pulses intact bilaterally    Neurological Exam:  Mental Status: Orientated to self, date and place.  Attention intact.  No dysarthria, aphasia or neglect.  Knowledge intact.  Registration intact.  Short and long term memory grossly intact.      Cranial Nerves: CN I - not tested.  PERRL, EOMI, VFF, no nystagmus or diplopia.  No APD.  Fundi not visualized bilaterally.  CN V1-3 intact to light touch and pinprick.  No facial asymmetry.  Hearing intact to finger rub bilaterally.  Tongue, uvula and palate midline.  Sternocleidomastoid and Trapezius intact bilaterally.    Motor:   Tone: normal.                  Strength: intact throughout  Pronator drift: none                 Dysmeria: None to finger-nose-finger or heel-shin-heel  No truncal ataxia.    Tremor: No resting, postural or action tremor.  No myoclonus.    Sensation: intact to light touch, pinprick, vibration and proprioception    Deep Tendon Reflexes: 1+ bilateral biceps, triceps, brachioradialis, knee and ankle  Toes flexor bilaterally    Gait: normal and stable.      Other:    09-14    140  |  107  |  9   ----------------------------<  191<H>  3.8   |  24  |  0.61    Ca    8.6      14 Sep 2018 07:47  Phos  2.2     09-14  Mg     1.7     09-14    TPro  6.7  /  Alb  2.3<L>  /  TBili  0.4  /  DBili  x   /  AST  13  /  ALT  17  /  AlkPhos  58  09-14    09-14    140  |  107  |  9   ----------------------------<  191<H>  3.8   |  24  |  0.61    Ca    8.6      14 Sep 2018 07:47  Phos  2.2     09-14  Mg     1.7     09-14    TPro  6.7  /  Alb  2.3<L>  /  TBili  0.4  /  DBili  x   /  AST  13  /  ALT  17  /  AlkPhos  58  09-14    LIVER FUNCTIONS - ( 14 Sep 2018 07:47 )  Alb: 2.3 g/dL / Pro: 6.7 g/dL / ALK PHOS: 58 U/L / ALT: 17 U/L DA / AST: 13 U/L / GGT: x             Radiology      EEG: INTERPRETATION:   No seizure sequences occurred.     This is an abnormal EEG indicating moderate diffuse cerebral dysfunction. Neurology Follow up note    Name  JACQUELIN LINDER      Subjective:  patent extubated, feeling better, had EEG    Review of Systems:  Constitutional:      no fever  Respiratory:      + cough                        MEDICATIONS  (STANDING):  artificial  tears Solution 1 Drop(s) Both EYES two times a day  cefepime   IVPB      cefepime   IVPB 2000 milliGRAM(s) IV Intermittent every 8 hours  enoxaparin Injectable 40 milliGRAM(s) SubCutaneous daily  ferrous    sulfate Liquid 300 milliGRAM(s) Oral daily  insulin glargine Injectable (LANTUS) 18 Unit(s) SubCutaneous at bedtime  insulin lispro (HumaLOG) corrective regimen sliding scale   SubCutaneous Before meals and at bedtime  insulin lispro Injectable (HumaLOG) 8 Unit(s) SubCutaneous three times a day before meals  lactulose Syrup 10 Gram(s) Oral two times a day  pantoprazole  Injectable 40 milliGRAM(s) IV Push two times a day  simvastatin 20 milliGRAM(s) Oral at bedtime    MEDICATIONS  (PRN):  acetaminophen    Suspension .. 650 milliGRAM(s) Oral every 6 hours PRN Temp greater or equal to 38C (100.4F), Mild Pain (1 - 3)      Allergies    No Known Allergies    Intolerances        Objective:   Vital Signs Last 24 Hrs  T(C): 36.9 (14 Sep 2018 08:00), Max: 37 (14 Sep 2018 04:36)  T(F): 98.5 (14 Sep 2018 08:00), Max: 98.6 (14 Sep 2018 04:36)  HR: 92 (14 Sep 2018 10:00) (83 - 101)  BP: 117/72 (14 Sep 2018 10:00) (85/47 - 129/76)  BP(mean): 82 (14 Sep 2018 10:00) (56 - 94)  RR: 23 (14 Sep 2018 10:00) (18 - 34)  SpO2: 98% (14 Sep 2018 10:00) (92% - 99%)    General Exam:   General appearance: No acute distress                 Cardiovascular: Pedal dorsalis pulses intact bilaterally    Neurological Exam:  Mental Status: Orientated to self, date and place.  Attention intact.  Has difficulty speaking.  No neglect.      Cranial Nerves: CN I - not tested.  PERRL, EOMI, VFF, no nystagmus or diplopia.  No APD.  Fundi not visualized bilaterally.  CN V1-3 intact to light touch.  No facial asymmetry.      Motor:   Tone: normal.                  Strength: intact throughout  Pronator drift: none                 Dysmeria: None to finger-nose-finger or heel-shin-heel    Sensation: intact to light touch  Other:    09-14    140  |  107  |  9   ----------------------------<  191<H>  3.8   |  24  |  0.61    Ca    8.6      14 Sep 2018 07:47  Phos  2.2     09-14  Mg     1.7     09-14    TPro  6.7  /  Alb  2.3<L>  /  TBili  0.4  /  DBili  x   /  AST  13  /  ALT  17  /  AlkPhos  58  09-14 09-14    140  |  107  |  9   ----------------------------<  191<H>  3.8   |  24  |  0.61    Ca    8.6      14 Sep 2018 07:47  Phos  2.2     09-14  Mg     1.7     09-14    TPro  6.7  /  Alb  2.3<L>  /  TBili  0.4  /  DBili  x   /  AST  13  /  ALT  17  /  AlkPhos  58  09-14    LIVER FUNCTIONS - ( 14 Sep 2018 07:47 )  Alb: 2.3 g/dL / Pro: 6.7 g/dL / ALK PHOS: 58 U/L / ALT: 17 U/L DA / AST: 13 U/L / GGT: x             Radiology      EEG: INTERPRETATION:   No seizure sequences occurred.     This is an abnormal EEG indicating moderate diffuse cerebral dysfunction.

## 2018-09-14 NOTE — DISCHARGE NOTE ADULT - INSTRUCTIONS
The DASH diet is rich in fruits, vegetables, whole grains, and low-fat dairy foods; includes meat, fish, poultry, nuts, and beans; and is limited in sugar-sweetened foods and beverages, red meat, and added fats

## 2018-09-14 NOTE — DISCHARGE NOTE ADULT - PLAN OF CARE
Patient had blood glucose of 40 mg/dl. patient took over dose of insulin. patient was evaluated by endocrinologist here who has readjusted her insulin regime. Please take 18 units of lantus at bed time and humalog 6 units three times a day before meal. Please avoid metformin. Please follow up with endocrinologist for continue to better control your diabetes prevent reoccurrence Patient was admitted for hypoxic respiratory failure secondary to pneumonia. patient was intubated and was later successfully extubated. Patient is being treated for pneumonia with IV antibiotics, which are now changed to oral till 9/18/2018. Please continue to take augmentin 875mg two times a day for 4 more days. Patient is being treated for pneumonia with IV antibiotics, which are now changed to oral till 9/18/2018. Please continue to take augmentin 875mg two times a day for 4 more days. blood culture negative to date. Patient was presented for unresponsiveness and was intubated for protection of air way. patient was encephalopathic likely secondary to septic shock or metabolic encephalopathy due to hypoglycemia. patient's encephalopathy is now resolved. Patient's mental status is back to base line. Patient was admitted for septic shock secondary to bilateral pneumonia. Patient was given Intravenous fluids and vasopressors (medication to raise blood pressure) . Patient's condition improved. patient was taken off vasopressor as her sepsis resolved and is now maintaing her blood pressure well. patient's blood pressure medications are being held due to normotensive blood pressure. Please resume blood pressure medication as needed one by one. patient's blood pressure medications are being held due to normotensive blood pressure. Please resume blood pressure medication as needed one by one. Patient's echocardiogram shows hypokinetic left ventricular wall and apex, EF 40-45%, Grade IV DD. Unknown if old or new. continue to aspirin. patient can benefit from ace inhibitor. slowly resume blood pressure medications if needed. Patient's cardiac work up for acute coronary syndrome is negative. Patient was admitted for hypoxic respiratory failure secondary to pneumonia. patient was intubated and was later successfully extubated. Patient is being treated for pneumonia with IV antibiotics, which are now changed to oral till 9/18/2018. Please continue to take augmentin 875mg two times a day for 2 more days. Patient is being treated for pneumonia with IV antibiotics, which are now changed to oral till 9/18/2018. Please continue to take augmentin 875mg two times a day for 2 more days. blood culture negative to date. Patient was admitted for hypoxic respiratory failure secondary to pneumonia. patient was intubated and was later successfully extubated. Patient is being treated for pneumonia with IV antibiotics which the patient completed the course in the hospital Patient is being treated for pneumonia with IV antibiotics which have completed treatment in the hospital Patient had blood glucose of 40 mg/dl. patient took over dose of insulin. patient was evaluated by endocrinologist here who has readjusted her insulin regime. Please take 18 units of lantus at bed time and humalog 8 units three times a day before meal. Please avoid metformin. Please follow up with endocrinologist for continue to better control your diabetes

## 2018-09-14 NOTE — PROGRESS NOTE ADULT - PROBLEM SELECTOR PROBLEM 5
DM (diabetes mellitus)
Acute respiratory failure
DM (diabetes mellitus)
DM (diabetes mellitus)
Hyperammonemia
Hyperammonemia
DM (diabetes mellitus)
Acute respiratory failure

## 2018-09-14 NOTE — CHART NOTE - NSCHARTNOTEFT_GEN_A_CORE
HPI:  56 y/o female from [facility] rehab with PMH of HTN, DM and ?cardiac disease (family not sure about exact diagnosis) and PSH of recent C spine surgery (1 month back at [place] for ?nerve compression) sent to ED for unresponsiveness. As per EMS, patient was thought to have low blood sugars so was treated for that without improvement in mental status. Fs in . CT head was unremarkable. CXR clear. Utox only positive for benzodiazepine ([patient is on Valium 5 mg q12). Patient was intubated by ED attending and admitted to ICU for further management.     Per rehab papers, patient was noted to have blood sugars of 49 last night at 9 pm, was given 2 mg of glucagon IM. AM fs at rehab was noted to be 73 and patient was minimally responsive so EMS was called. Spoke to Dr. Welch who takes care of her at rehab. Staff at rehab found patient's home medications at bedside, seems patient took her own Insulin on top of the Insulin given bu rehab staff , leading to hypoglycemia and unresponsiveness. Patient was last seen normal last night before dinner. (08 Sep 2018 11:48)    ICU course:    Problem/Plan - 1:  ·  Problem: Acute respiratory failure with hypoxia.  Plan: Resolved  secondary to PN   afebrile in 24 hours   -blood culture NTD  CT abd: B/l lower lobe consolidation   c/w Abx. will switch cefepime to PO on discharge to rehab  Stable to DG/ DC to rehab   CM to follow.      Problem/Plan - 2:  ·  Problem: Septic shock.  Plan: 2/2 PNA now resolved.   c/w cefepime as per ID recommendation till 9/18 will switch to PO AUgmentin on discharge  -MRSA -ve /MSSA +ve , NTD blood culture and sputum culture.   ID Dr Lehman      Problem/Plan - 3:  ·  Problem: Pneumonia.  Plan: Hypoxic respiratory failure   afebrile last 24 hours.   NTD blood cx   CT abd: B/l lower lobe consolidation   c/w Iv antibiotics for now-> switch to PO Augmentin on discharge.      Problem/Plan - 4:  ·  Problem: Unresponsiveness.  Plan: -most likely secondary to hypoglycemia as patient was noted to be hypoglycemic to 40s at rehab on admission, also received glucagon in ED.  -CT head negative for any acute event  -CXR clear, lactate 0.6, UA is negative, s/p 1 dose of Zosyn in ED  -Blood alcohol <3, serum acetaminophen and salicylate level wnl.   -Utox positive for benzodiazepines (takes valium 5 mg q12)  -Could be CVA v/s seizure, metabolic encephalopathy will consider MRI when stable  -patient is now Extubated and passed bed side swallow eval.      Problem/Plan - 5:  ·  Problem: DM (diabetes mellitus).  Plan: -Home meds includes Basaglar 38 units at bedtime, Humalog 5 units pre meals and correctional scale   -on HSS and lantus 18 units and added Humalog 6 TID pre meal   accu checks AC and HS  -Hba1c is 7.6  endocrine Dr evangelista following **.      Problem/Plan - 6:  Problem: HTN (hypertension). Plan: carvedilol held due to to low blood pressure.   -Holding home Losartan and HCTZ.     Problem/Plan - 7:  ·  Problem: Cardiac disease.  Plan: -Family not sure about exact diagnosis  -c/w aspirin, statin  -Holding Losartan and HCTZ for now, restart as appropriate.  -echo-> hypokinetic left ventricular wall and apex, EF 40-45%, Grade IV DD.      Problem/Plan - 8:  ·  Problem: Prophylactic measure.  Plan: IMPROVE VTE Individual Risk Assessment          RISK                                                          Points  [  ] Previous VTE                                                3  [  ] Thrombophilia                                             2  [  ] Lower limb paralysis                                   2        (unable to hold up >15 seconds)    [  ] Current Cancer                                             2         (within 6 months)  [ x ] Immobilization > 24 hrs                              1  [ x ] ICU/CCU stay > 24 hours                             1  [  ] Age > 60                                                         1    IMPROVE VTE Score:  2  Heparin sq for DVT ppx.     Things to follow:  Discharge on Augmentin 875 BID till 9/18  f/u with CM regarding discharge Plan  Please remind patient before discharge not to administer her own Insulin regime. HPI:  56 y/o female from [facility] rehab with PMH of HTN, DM and ?cardiac disease (family not sure about exact diagnosis) and PSH of recent C spine surgery (1 month back at [place] for ?nerve compression) sent to ED for unresponsiveness. As per EMS, patient was thought to have low blood sugars so was treated for that without improvement in mental status. Fs in . CT head was unremarkable. CXR clear. Utox only positive for benzodiazepine ([patient is on Valium 5 mg q12). Patient was intubated by ED attending and admitted to ICU for further management.     Per rehab papers, patient was noted to have blood sugars of 49 last night at 9 pm, was given 2 mg of glucagon IM. AM fs at rehab was noted to be 73 and patient was minimally responsive so EMS was called. Spoke to Dr. Welch who takes care of her at rehab. Staff at rehab found patient's home medications at bedside, seems patient took her own Insulin on top of the Insulin given bu rehab staff , leading to hypoglycemia and unresponsiveness. Patient was last seen normal last night before dinner. (08 Sep 2018 11:48)    ICU course:    Problem/Plan - 1:  ·  Problem: Acute respiratory failure with hypoxia.  Plan: Resolved  secondary to PN was intubated and successfully extubated.   afebrile in 24 hours   -blood culture NTD  CT abd: B/l lower lobe consolidation   c/w Abx. will switch cefepime to PO on discharge to rehab  Stable to DG/ DC to rehab   CM to follow.      Problem/Plan - 2:  ·  Problem: Septic shock.  Plan: 2/2 PNA now resolved.   c/w cefepime as per ID recommendation till 9/18 will switch to PO AUgmentin on discharge  -MRSA -ve /MSSA +ve , NTD blood culture and sputum culture.   ID Dr Lehman      Problem/Plan - 3:  ·  Problem: Pneumonia.  Plan: Hypoxic respiratory failure   afebrile last 24 hours.   NTD blood cx   CT abd: B/l lower lobe consolidation   c/w Iv antibiotics for now-> switch to PO Augmentin on discharge.      Problem/Plan - 4:  ·  Problem: Unresponsiveness.  Plan: -most likely secondary to hypoglycemia as patient was noted to be hypoglycemic to 40s at rehab on admission, also received glucagon in ED.  -CT head negative for any acute event  -CXR clear, lactate 0.6, UA is negative, s/p 1 dose of Zosyn in ED  -Blood alcohol <3, serum acetaminophen and salicylate level wnl.   -Utox positive for benzodiazepines (takes valium 5 mg q12)  -Could be CVA v/s seizure, metabolic encephalopathy will consider MRI when stable  -patient is now Extubated and passed bed side swallow eval.      Problem/Plan - 5:  ·  Problem: DM (diabetes mellitus).  Plan: -Home meds includes Basaglar 38 units at bedtime, Humalog 5 units pre meals and correctional scale   -on HSS and lantus 18 units and added Humalog 6 TID pre meal   accu checks AC and HS  -Hba1c is 7.6  endocrine Dr evangelista following **.      Problem/Plan - 6:  Problem: HTN (hypertension). Plan: carvedilol held due to to low blood pressure.   -Holding home Losartan and HCTZ.     Problem/Plan - 7:  ·  Problem: Cardiac disease.  Plan: -Family not sure about exact diagnosis  -c/w aspirin, statin  -Holding Losartan and HCTZ for now, restart as appropriate.  -echo-> hypokinetic left ventricular wall and apex, EF 40-45%, Grade IV DD.      Problem/Plan - 8:  ·  Problem: Prophylactic measure.  Plan: IMPROVE VTE Individual Risk Assessment          RISK                                                          Points  [  ] Previous VTE                                                3  [  ] Thrombophilia                                             2  [  ] Lower limb paralysis                                   2        (unable to hold up >15 seconds)    [  ] Current Cancer                                             2         (within 6 months)  [ x ] Immobilization > 24 hrs                              1  [ x ] ICU/CCU stay > 24 hours                             1  [  ] Age > 60                                                         1    IMPROVE VTE Score:  2  Heparin sq for DVT ppx.     Things to follow:  Discharge on Augmentin 875 BID till 9/18  f/u with CM regarding discharge Plan  Please remind patient before discharge not to administer her own Insulin regime. HPI:  56 y/o female from [facility] rehab with PMH of HTN, DM and ?cardiac disease (family not sure about exact diagnosis) and PSH of recent C spine surgery (1 month back at [place] for ?nerve compression) sent to ED for unresponsiveness. As per EMS, patient was thought to have low blood sugars so was treated for that without improvement in mental status. Fs in . CT head was unremarkable. CXR clear. Utox only positive for benzodiazepine ([patient is on Valium 5 mg q12). Patient was intubated by ED attending and admitted to ICU for further management.     Per rehab papers, patient was noted to have blood sugars of 49 last night at 9 pm, was given 2 mg of glucagon IM. AM fs at rehab was noted to be 73 and patient was minimally responsive so EMS was called. Spoke to Dr. Welch who takes care of her at rehab. Staff at rehab found patient's home medications at bedside, seems patient took her own Insulin on top of the Insulin given bu rehab staff , leading to hypoglycemia and unresponsiveness. Patient was last seen normal last night before dinner. (08 Sep 2018 11:48)    ICU course:    Problem/Plan - 1:  ·  Problem: Acute respiratory failure with hypoxia.  Plan: Resolved  secondary to PN was intubated and successfully extubated.   afebrile in 24 hours   -blood culture NTD  CT abd: B/l lower lobe consolidation   c/w Abx. will switch cefepime to PO on discharge to rehab  Stable to DG/ DC to rehab   CM to follow.      Problem/Plan - 2:  ·  Problem: Septic shock.  Plan: 2/2 PNA now resolved.   c/w cefepime as per ID recommendation till 9/18 will switch to PO AUgmentin on discharge  -MRSA -ve /MSSA +ve , NTD blood culture and sputum culture.   ID Dr Lehman      Problem/Plan - 3:  ·  Problem: Pneumonia.  Plan: Hypoxic respiratory failure   afebrile last 24 hours.   NTD blood cx   CT abd: B/l lower lobe consolidation   c/w Iv antibiotics for now-> switch to PO Augmentin on discharge.      Problem/Plan - 4:  ·  Problem: Unresponsiveness.  Plan: -most likely secondary to hypoglycemia as patient was noted to be hypoglycemic to 40s at rehab on admission, also received glucagon in ED.  -CT head negative for any acute event  -CXR clear, lactate 0.6, UA is negative, s/p 1 dose of Zosyn in ED  -Blood alcohol <3, serum acetaminophen and salicylate level wnl.   -Utox positive for benzodiazepines (takes valium 5 mg q12)  -Could be CVA v/s seizure, metabolic encephalopathy will consider MRI when stable  -patient is now Extubated and passed bed side swallow eval.      Problem/Plan - 5:  ·  Problem: DM (diabetes mellitus).  Plan: -Home meds includes Basaglar 38 units at bedtime, Humalog 5 units pre meals and correctional scale   -on HSS and lantus 18 units and added Humalog 6 TID pre meal   accu checks AC and HS  -Hba1c is 7.6  endocrine Dr evangelista following **.      Problem/Plan - 6:  Problem: HTN (hypertension). Plan: carvedilol held due to to low blood pressure.   -Holding home Losartan and HCTZ.     Problem/Plan - 7:  ·  Problem: Cardiac disease.  Plan: -Family not sure about exact diagnosis  -c/w aspirin, statin  -Holding Losartan and HCTZ for now, restart as appropriate.  -echo-> hypokinetic left ventricular wall and apex, EF 40-45%, Grade IV DD.      Problem/Plan - 8:  ·  Problem: Prophylactic measure.  Plan: IMPROVE VTE Individual Risk Assessment          RISK                                                          Points  [  ] Previous VTE                                                3  [  ] Thrombophilia                                             2  [  ] Lower limb paralysis                                   2        (unable to hold up >15 seconds)    [  ] Current Cancer                                             2         (within 6 months)  [ x ] Immobilization > 24 hrs                              1  [ x ] ICU/CCU stay > 24 hours                             1  [  ] Age > 60                                                         1    IMPROVE VTE Score:  2  Heparin sq for DVT ppx.     Things to follow:  Discharge on Augmentin 875 BID till 9/18  f/u with CM regarding discharge Plan  Please remind patient before discharge not to administer her own Insulin regime.    Agree with above assessment and plan as transcribed.

## 2018-09-14 NOTE — DISCHARGE NOTE ADULT - MEDICATION SUMMARY - MEDICATIONS TO CHANGE
I will SWITCH the dose or number of times a day I take the medications listed below when I get home from the hospital:    Basaglar KwikPen 100 units/mL subcutaneous solution  -- 38 unit(s) subcutaneous once a day (at bedtime)    HumaLOG 100 units/mL subcutaneous solution  -- 5 unit(s) subcutaneous 3 times a day (before meals)

## 2018-09-14 NOTE — PROGRESS NOTE ADULT - ASSESSMENT
56 y/o female from [facility] rehab with PMH of HTN, DM and ?cardiac disease (family not sure about exact diagnosis) and PSH of recent C spine surgery (1 month back at [place] for ?nerve compression) sent to ED for unresponsiveness. Likely secondary to hypoglycemia. Was intubated in ED and admitted to ICU on 9/9/10 and extubated on 9/11.

## 2018-09-14 NOTE — DISCHARGE NOTE ADULT - MEDICATION SUMMARY - MEDICATIONS TO STOP TAKING
I will STOP taking the medications listed below when I get home from the hospital:    hydroCHLOROthiazide 25 mg oral tablet  -- 1 tab(s) by mouth once a day    metFORMIN 1000 mg oral tablet  -- 1 tab(s) by mouth 2 times a day    Valium 5 mg oral tablet  -- 1 tab(s) by mouth every 12 hours    Aspercreme with Lidocaine 4% topical film  -- Apply on skin to affected area once a day    ferrous sulfate 325 mg (65 mg elemental iron) oral tablet  -- 1 tab(s) by mouth once a day    Flexeril 10 mg oral tablet  -- 1 tab(s) by mouth 3 times a day    carvedilol 25 mg oral tablet  -- 1 tab(s) by mouth 2 times a day    losartan 100 mg oral tablet  -- 1 tab(s) by mouth once a day    Colace 100 mg oral capsule  -- 3 cap(s) by mouth once a day (at bedtime)    HumaLOG 100 units/mL subcutaneous solution  -- sliding scale    Percocet 5/325 oral tablet  -- 1 tab(s) by mouth every 4 hours, As Needed    Maalox Advanced Regular Strength oral suspension  -- 30 milliliter(s) by mouth 3 times a day

## 2018-09-14 NOTE — PROGRESS NOTE ADULT - SUBJECTIVE AND OBJECTIVE BOX
Interval Events:  pt in nad    Allergies    No Known Allergies    Intolerances      Endocrine/Metabolic Medications:  insulin glargine Injectable (LANTUS) 18 Unit(s) SubCutaneous at bedtime  insulin lispro (HumaLOG) corrective regimen sliding scale   SubCutaneous Before meals and at bedtime  insulin lispro Injectable (HumaLOG) 6 Unit(s) SubCutaneous three times a day before meals  simvastatin 20 milliGRAM(s) Oral at bedtime      Vital Signs Last 24 Hrs  T(C): 36 (14 Sep 2018 16:00), Max: 37 (14 Sep 2018 04:36)  T(F): 96.8 (14 Sep 2018 16:00), Max: 98.6 (14 Sep 2018 04:36)  HR: 95 (14 Sep 2018 17:00) (83 - 106)  BP: 124/72 (14 Sep 2018 17:00) (90/72 - 133/122)  BP(mean): 86 (14 Sep 2018 17:00) (74 - 125)  RR: 18 (14 Sep 2018 17:00) (17 - 40)  SpO2: 98% (14 Sep 2018 17:00) (92% - 99%)      PHYSICAL EXAM  All physical exam findings normal, except those marked:  General:	Alert, active, cooperative, NAD, well hydrated  .		[] Abnormal:  Neck		Normal: supple, no cervical adenopathy, no palpable thyroid  .		[] Abnormal:  Cardiovascular	Normal: regular rate, normal S1, S2, no murmurs  .		[] Abnormal:  Respiratory	Normal: no chest wall deformity, normal respiratory pattern, CTA B/L  .		[] Abnormal:  Abdominal	Normal: soft, ND, NT, bowel sounds present, no masses, no organomegaly  .		[] Abnormal:  		Normal normal genitalia, testes descended, circumcised/uncircumcised  .		Addison stage:			Breast addison:  .		Menstrual history:  .		[] Abnormal:  Extremities	Normal: FROM x4  .		[] Abnormal:  Skin		Normal: intact and not indurated, no rash, no acanthosis nigricans  .		[] Abnormal:  Neurologic	Normal: grossly intact  .		[] Abnormal:    LABS                        11.8   8.7   )-----------( 293      ( 14 Sep 2018 07:47 )             36.1                               140    |  107    |  9                   Calcium: 8.6   / iCa: x      (09-14 @ 07:47)    ----------------------------<  191       Magnesium: 1.7                              3.8     |  24     |  0.61             Phosphorous: 2.2      TPro  6.7    /  Alb  2.3    /  TBili  0.4    /  DBili  x      /  AST  13     /  ALT  17     /  AlkPhos  58     14 Sep 2018 07:47    CAPILLARY BLOOD GLUCOSE      POCT Blood Glucose.: 185 mg/dL (14 Sep 2018 15:48)  POCT Blood Glucose.: 205 mg/dL (14 Sep 2018 11:11)  POCT Blood Glucose.: 177 mg/dL (14 Sep 2018 06:17)  POCT Blood Glucose.: 353 mg/dL (13 Sep 2018 21:22)        Assesment/plan    DM-s/p hypoglycemia  agree with lantus 18 units  change humalog 6 units ac tid  aim fsg 140-<200 due to risk of hypoglycemia  d/w hs

## 2018-09-14 NOTE — PROGRESS NOTE ADULT - ASSESSMENT
A 58 yo female who [facility] and unresponsiveness. As per EMS, patient was thought to have low blood sugars so was treated for that without improvement in mental status. Hence, intubated by ED attending and admitted to ICU for further management. She found to have Leukocytosis, fever and B/L extensive Lower lobe infiltrate. She has started on Ceftriaxone and Doxycyline and The ID consult requested to assist with further evaluation and antibiotic management.    # Septic shock- on pressor  # B/L Lower lobe Pneumonia - MRSA/MSSA PCR is negative  - Suspected Gram Negative Pneumonia    Would recommend:    1. OOb to chair/PT  2. Continue Cefepime  inpatient , May change to oral Abx on discharge to continue until 9/18/18  3. Aspiration precaution  4. Continue supplemental oxygenation and bronchodilator as needed     d/w ICU team     will follow the patient with you A 56 yo [facility]. As per EMS, patient was thought to have low blood sugars so was treated for that without improvement in mental status. Hence, intubated by ED attending and admitted to ICU for further management. She found to have Leukocytosis, fever and B/L extensive Lower lobe infiltrate. She has started on Ceftriaxone and Doxycyline and The ID consult requested to assist with further evaluation and antibiotic management.    # Septic shock- on pressor  # B/L Lower lobe Pneumonia - MRSA/MSSA PCR is negative  - Suspected Gram Negative Pneumonia    Would recommend:    1. Continue Cefepime  inpatient , May change to oral Augmentin 875 mg [id number] on discharge, to continue until 9/18/18  2 OOb to chair/PT  3. Aspiration precaution  4. Continue supplemental oxygenation and bronchodilator as needed     d/w ICU team     will follow the patient with you

## 2018-09-14 NOTE — DISCHARGE NOTE ADULT - MEDICATION SUMMARY - MEDICATIONS TO TAKE
I will START or STAY ON the medications listed below when I get home from the hospital:    aspirin 81 mg oral tablet, chewable  -- 1 tab(s) by mouth once a day  -- Indication: For Diabetes    gabapentin 300 mg oral capsule  -- 1 cap(s) by mouth once a day (at bedtime)  -- Indication: For Peripheral neuropathy    Basaglar KwikPen 100 units/mL subcutaneous solution  -- 18 unit(s) subcutaneous once a day (at bedtime)  -- Indication: For Diabetes mellitus    HumaLOG 100 units/mL subcutaneous solution  -- 6 unit(s) subcutaneous 3 times a day (before meals)  -- Indication: For Diabetes mellitus    simvastatin 20 mg oral tablet  -- 1 tab(s) by mouth once a day (at bedtime)  -- Indication: For HLD    raNITIdine 150 mg oral tablet  -- 1 tab(s) by mouth once a day  -- Indication: For Prophylactic measure    ferrous sulfate 325 mg (65 mg elemental iron) oral tablet  -- 1 tab(s) by mouth once a day  -- Indication: For Anemia    ocular lubricant ophthalmic solution  -- 1 drop(s) to each affected eye 2 times a day  -- Indication: For Prophylactic measure    Augmentin 875 mg-125 mg oral tablet  -- 1  by mouth 2 times a day   -- Finish all this medication unless otherwise directed by prescriber.  Take with food or milk.    -- Indication: For Pneumonia    Vitamin B-100 oral tablet  -- 1 tab(s) by mouth once a day  -- Indication: For Prophylactic measure I will START or STAY ON the medications listed below when I get home from the hospital:    aspirin 81 mg oral tablet, chewable  -- 1 tab(s) by mouth once a day  -- Indication: For Diabetes    gabapentin 300 mg oral capsule  -- 1 cap(s) by mouth once a day (at bedtime)  -- Indication: For Peripheral neuropathy    Basaglar KwikPen 100 units/mL subcutaneous solution  -- 18 unit(s) subcutaneous once a day (at bedtime)  -- Indication: For Diabetes mellitus    HumaLOG 100 units/mL subcutaneous solution  -- 6 unit(s) subcutaneous 3 times a day (before meals)  -- Indication: For Diabetes mellitus    simvastatin 20 mg oral tablet  -- 1 tab(s) by mouth once a day (at bedtime)  -- Indication: For HLD    raNITIdine 150 mg oral tablet  -- 1 tab(s) by mouth once a day  -- Indication: For Prophylactic measure    ferrous sulfate 325 mg (65 mg elemental iron) oral tablet  -- 1 tab(s) by mouth once a day  -- Indication: For Anemia    ocular lubricant ophthalmic solution  -- 1 drop(s) to each affected eye 2 times a day  -- Indication: For Prophylactic measure    Vitamin B-100 oral tablet  -- 1 tab(s) by mouth once a day  -- Indication: For Prophylactic measure I will START or STAY ON the medications listed below when I get home from the hospital:    aspirin 81 mg oral tablet, chewable  -- 1 tab(s) by mouth once a day  -- Indication: For Diabetes    gabapentin 300 mg oral capsule  -- 1 cap(s) by mouth once a day (at bedtime)  -- Indication: For Peripheral neuropathy    HumaLOG KwikPen 200 units/mL (Concentrated) subcutaneous solution  -- 8 unit(s) subcutaneous 3 times a day   -- Indication: For DM (diabetes mellitus)    Basaglar KwikPen 100 units/mL subcutaneous solution  -- 18 unit(s) subcutaneous once a day (at bedtime)  -- Indication: For Diabetes mellitus    simvastatin 20 mg oral tablet  -- 1 tab(s) by mouth once a day (at bedtime)  -- Indication: For HLD    raNITIdine 150 mg oral tablet  -- 1 tab(s) by mouth once a day  -- Indication: For Prophylactic measure    ferrous sulfate 325 mg (65 mg elemental iron) oral tablet  -- 1 tab(s) by mouth once a day  -- Indication: For Anemia    ocular lubricant ophthalmic solution  -- 1 drop(s) to each affected eye 2 times a day  -- Indication: For Prophylactic measure    Vitamin B-100 oral tablet  -- 1 tab(s) by mouth once a day  -- Indication: For Prophylactic measure

## 2018-09-14 NOTE — DISCHARGE NOTE ADULT - PATIENT PORTAL LINK FT
You can access the [facility] Portal, offered by [facility], by registering with the following website: [email or web address]

## 2018-09-14 NOTE — PROGRESS NOTE ADULT - PROBLEM SELECTOR PROBLEM 8
Prophylactic measure
Cardiac disease
Cardiac disease
Prophylactic measure

## 2018-09-15 LAB
ALBUMIN SERPL ELPH-MCNC: 2.4 G/DL — LOW (ref 3.5–5)
ALP SERPL-CCNC: 57 U/L — SIGNIFICANT CHANGE UP (ref 40–120)
ALT FLD-CCNC: 18 U/L DA — SIGNIFICANT CHANGE UP (ref 10–60)
ANION GAP SERPL CALC-SCNC: 7 MMOL/L — SIGNIFICANT CHANGE UP (ref 5–17)
AST SERPL-CCNC: 12 U/L — SIGNIFICANT CHANGE UP (ref 10–40)
BASOPHILS # BLD AUTO: 0.1 K/UL — SIGNIFICANT CHANGE UP (ref 0–0.2)
BASOPHILS NFR BLD AUTO: 1.5 % — SIGNIFICANT CHANGE UP (ref 0–2)
BILIRUB SERPL-MCNC: 0.4 MG/DL — SIGNIFICANT CHANGE UP (ref 0.2–1.2)
BUN SERPL-MCNC: 9 MG/DL — SIGNIFICANT CHANGE UP (ref 7–18)
CALCIUM SERPL-MCNC: 8.7 MG/DL — SIGNIFICANT CHANGE UP (ref 8.4–10.5)
CHLORIDE SERPL-SCNC: 105 MMOL/L — SIGNIFICANT CHANGE UP (ref 96–108)
CO2 SERPL-SCNC: 25 MMOL/L — SIGNIFICANT CHANGE UP (ref 22–31)
CREAT SERPL-MCNC: 0.68 MG/DL — SIGNIFICANT CHANGE UP (ref 0.5–1.3)
EOSINOPHIL # BLD AUTO: 0.3 K/UL — SIGNIFICANT CHANGE UP (ref 0–0.5)
EOSINOPHIL NFR BLD AUTO: 4.9 % — SIGNIFICANT CHANGE UP (ref 0–6)
GLUCOSE BLDC GLUCOMTR-MCNC: 175 MG/DL — HIGH (ref 70–99)
GLUCOSE BLDC GLUCOMTR-MCNC: 219 MG/DL — HIGH (ref 70–99)
GLUCOSE BLDC GLUCOMTR-MCNC: 234 MG/DL — HIGH (ref 70–99)
GLUCOSE BLDC GLUCOMTR-MCNC: 235 MG/DL — HIGH (ref 70–99)
GLUCOSE SERPL-MCNC: 221 MG/DL — HIGH (ref 70–99)
HCT VFR BLD CALC: 34.6 % — SIGNIFICANT CHANGE UP (ref 34.5–45)
HGB BLD-MCNC: 11.3 G/DL — LOW (ref 11.5–15.5)
LYMPHOCYTES # BLD AUTO: 2.2 K/UL — SIGNIFICANT CHANGE UP (ref 1–3.3)
LYMPHOCYTES # BLD AUTO: 31.9 % — SIGNIFICANT CHANGE UP (ref 13–44)
MAGNESIUM SERPL-MCNC: 1.6 MG/DL — SIGNIFICANT CHANGE UP (ref 1.6–2.6)
MCHC RBC-ENTMCNC: 28.6 PG — SIGNIFICANT CHANGE UP (ref 27–34)
MCHC RBC-ENTMCNC: 32.7 GM/DL — SIGNIFICANT CHANGE UP (ref 32–36)
MCV RBC AUTO: 87.4 FL — SIGNIFICANT CHANGE UP (ref 80–100)
MONOCYTES # BLD AUTO: 0.6 K/UL — SIGNIFICANT CHANGE UP (ref 0–0.9)
MONOCYTES NFR BLD AUTO: 8.1 % — SIGNIFICANT CHANGE UP (ref 2–14)
NEUTROPHILS # BLD AUTO: 3.8 K/UL — SIGNIFICANT CHANGE UP (ref 1.8–7.4)
NEUTROPHILS NFR BLD AUTO: 53.7 % — SIGNIFICANT CHANGE UP (ref 43–77)
PHOSPHATE SERPL-MCNC: 3.3 MG/DL — SIGNIFICANT CHANGE UP (ref 2.5–4.5)
PLATELET # BLD AUTO: 284 K/UL — SIGNIFICANT CHANGE UP (ref 150–400)
POTASSIUM SERPL-MCNC: 3.6 MMOL/L — SIGNIFICANT CHANGE UP (ref 3.5–5.3)
POTASSIUM SERPL-SCNC: 3.6 MMOL/L — SIGNIFICANT CHANGE UP (ref 3.5–5.3)
PROT SERPL-MCNC: 6.6 G/DL — SIGNIFICANT CHANGE UP (ref 6–8.3)
RBC # BLD: 3.96 M/UL — SIGNIFICANT CHANGE UP (ref 3.8–5.2)
RBC # FLD: 12.5 % — SIGNIFICANT CHANGE UP (ref 10.3–14.5)
SODIUM SERPL-SCNC: 137 MMOL/L — SIGNIFICANT CHANGE UP (ref 135–145)
WBC # BLD: 7 K/UL — SIGNIFICANT CHANGE UP (ref 3.8–10.5)
WBC # FLD AUTO: 7 K/UL — SIGNIFICANT CHANGE UP (ref 3.8–10.5)

## 2018-09-15 RX ORDER — VALACYCLOVIR 500 MG/1
2000 TABLET, FILM COATED ORAL EVERY 12 HOURS
Qty: 0 | Refills: 0 | Status: COMPLETED | OUTPATIENT
Start: 2018-09-15 | End: 2018-09-16

## 2018-09-15 RX ADMIN — Medication 1 DROP(S): at 06:01

## 2018-09-15 RX ADMIN — ENOXAPARIN SODIUM 40 MILLIGRAM(S): 100 INJECTION SUBCUTANEOUS at 12:52

## 2018-09-15 RX ADMIN — Medication 1 DROP(S): at 18:10

## 2018-09-15 RX ADMIN — CEFEPIME 100 MILLIGRAM(S): 1 INJECTION, POWDER, FOR SOLUTION INTRAMUSCULAR; INTRAVENOUS at 21:50

## 2018-09-15 RX ADMIN — Medication 4: at 17:02

## 2018-09-15 RX ADMIN — CEFEPIME 100 MILLIGRAM(S): 1 INJECTION, POWDER, FOR SOLUTION INTRAMUSCULAR; INTRAVENOUS at 14:01

## 2018-09-15 RX ADMIN — SIMVASTATIN 20 MILLIGRAM(S): 20 TABLET, FILM COATED ORAL at 21:50

## 2018-09-15 RX ADMIN — Medication 6 UNIT(S): at 12:51

## 2018-09-15 RX ADMIN — Medication 81 MILLIGRAM(S): at 12:52

## 2018-09-15 RX ADMIN — Medication 6 UNIT(S): at 17:01

## 2018-09-15 RX ADMIN — CEFEPIME 100 MILLIGRAM(S): 1 INJECTION, POWDER, FOR SOLUTION INTRAMUSCULAR; INTRAVENOUS at 06:00

## 2018-09-15 RX ADMIN — Medication 300 MILLIGRAM(S): at 12:52

## 2018-09-15 RX ADMIN — VALACYCLOVIR 2000 MILLIGRAM(S): 500 TABLET, FILM COATED ORAL at 18:09

## 2018-09-15 RX ADMIN — LACTULOSE 10 GRAM(S): 10 SOLUTION ORAL at 06:00

## 2018-09-15 RX ADMIN — Medication 4: at 12:52

## 2018-09-15 RX ADMIN — LACTULOSE 10 GRAM(S): 10 SOLUTION ORAL at 18:10

## 2018-09-15 RX ADMIN — PANTOPRAZOLE SODIUM 40 MILLIGRAM(S): 20 TABLET, DELAYED RELEASE ORAL at 06:00

## 2018-09-15 RX ADMIN — Medication 2: at 21:50

## 2018-09-15 RX ADMIN — Medication 6 UNIT(S): at 09:00

## 2018-09-15 RX ADMIN — INSULIN GLARGINE 18 UNIT(S): 100 INJECTION, SOLUTION SUBCUTANEOUS at 21:51

## 2018-09-15 RX ADMIN — PANTOPRAZOLE SODIUM 40 MILLIGRAM(S): 20 TABLET, DELAYED RELEASE ORAL at 18:09

## 2018-09-15 RX ADMIN — Medication 4: at 09:01

## 2018-09-15 NOTE — PROGRESS NOTE ADULT - SUBJECTIVE AND OBJECTIVE BOX
Patient is seen and examined at the bed side, is afebrile. She has transferred out of ICU, doing better except c/o some burning and lip lesions.      REVIEW OF SYSTEMS: Unable to obtain since intubated and not responsive      ALLERGIES: NKDA      Vital Signs Last 24 Hrs  T(C): 36.9 (15 Sep 2018 15:00), Max: 36.9 (14 Sep 2018 22:16)  T(F): 98.4 (15 Sep 2018 15:00), Max: 98.5 (14 Sep 2018 22:16)  HR: 101 (15 Sep 2018 15:00) (80 - 101)  BP: 153/88 (15 Sep 2018 15:00) (113/69 - 153/88)  BP(mean): 75 (14 Sep 2018 20:00) (75 - 86)  RR: 17 (15 Sep 2018 15:00) (15 - 26)  SpO2: 100% (15 Sep 2018 15:00) (95% - 100%)      PHYSICAL EXAM:  GENERAL: Not in distress  HEENT: Lip lesions  CVS: s1 and s2 present  RESP: Air entry B/L  GI: Abdomen non distended and Nontender  EXT: No pedal edema   CNS: Awake and Alert        LABS:                        11.3   7.0   )-----------( 284      ( 15 Sep 2018 07:46 )             34.6                12.1   9.4   )-----------( 267      ( 13 Sep 2018 06:43 )             36.8                        11.6   15.0  )-----------( 227      ( 10 Sep 2018 06:13 )             35.6         15    137  |  105  |  9   ----------------------------<  221<H>  3.6   |  25  |  0.68    Ca    8.7      15 Sep 2018 07:46  Phos  3.3     09-15  Mg     1.6     09-15    TPro  6.6  /  Alb  2.4<L>  /  TBili  0.4  /  DBili  x   /  AST  12  /  ALT  18  /  AlkPhos  57  -15    09-14    140  |  107  |  9   ----------------------------<  191<H>  3.8   |  24  |  0.61    Ca    8.6      14 Sep 2018 07:47  Phos  2.2       Mg     1.7         TPro  6.7  /  Alb  2.3<L>  /  TBili  0.4  /  DBili  x   /  AST  13  /  ALT  17  /  AlkPhos  58            Color: Yellow / Appearance: Clear / S.010 / pH: x  Gluc: x / Ketone: Trace  / Bili: Negative / Urobili: Negative   Blood: x / Protein: 100 / Nitrite: Negative   Leuk Esterase: Trace / RBC: 0-2 /HPF / WBC 0-2 /HPF   Sq Epi: x / Non Sq Epi: Few /HPF / Bacteria: Negative /HPF      CAPILLARY BLOOD GLUCOSE      POCT Blood Glucose.: 166 mg/dL (09 Sep 2018 17:59)  POCT Blood Glucose.: 257 mg/dL (09 Sep 2018 13:45)  POCT Blood Glucose.: 301 mg/dL (09 Sep 2018 12:04)  POCT Blood Glucose.: 289 mg/dL (09 Sep 2018 06:24)  POCT Blood Glucose.: 198 mg/dL (08 Sep 2018 23:46)  POCT Blood Glucose.: 168 mg/dL (08 Sep 2018 22:29)  POCT Blood Glucose.: 163 mg/dL (08 Sep 2018 20:12)  POCT Blood Glucose.: 142 mg/dL (08 Sep 2018 18:59)    ABG - ( 09 Sep 2018 04:43 )  pH, Arterial: 7.29  pH, Blood: x     /  pCO2: 40    /  pO2: 108   / HCO3: 19    / Base Excess: -6.7  /  SaO2: 98            MEDICATIONS  (STANDING):  artificial  tears Solution 1 Drop(s) Both EYES two times a day  aspirin  chewable 81 milliGRAM(s) Oral daily  cefepime   IVPB      cefepime   IVPB 2000 milliGRAM(s) IV Intermittent every 8 hours  enoxaparin Injectable 40 milliGRAM(s) SubCutaneous daily  ferrous    sulfate Liquid 300 milliGRAM(s) Oral daily  insulin glargine Injectable (LANTUS) 18 Unit(s) SubCutaneous at bedtime  insulin lispro (HumaLOG) corrective regimen sliding scale   SubCutaneous Before meals and at bedtime  insulin lispro Injectable (HumaLOG) 6 Unit(s) SubCutaneous three times a day before meals  lactulose Syrup 10 Gram(s) Oral two times a day  pantoprazole  Injectable 40 milliGRAM(s) IV Push two times a day  simvastatin 20 milliGRAM(s) Oral at bedtime    MEDICATIONS  (PRN):  acetaminophen    Suspension .. 650 milliGRAM(s) Oral every 6 hours PRN Temp greater or equal to 38C (100.4F), Mild Pain (1 - 3)        RADIOLOGY & ADDITIONAL TESTS:    9/10/18: Xray Chest 1 View- PORTABLE-Routine (09.10.18 @ 06:59) ET and enteric tubes, and right IJ catheter remain in place. No pneumothorax. Small layering left pleural effusion has increased. Small right pleural effusion is unchanged. Mild pulmonary vascular congestion and scattered  lung opacities are similar to the prior study.    18 : Xray Chest 1 View- PORTABLE-Routine (18 @ 10:01) Mild pulmonary venous congestion, increased since the prior study. No pleural effusions . Hazy opacities in the lower lobes could   represent atelectasis or infiltrates.      18 : CT Abdomen and Pelvis No Cont (18 @ 03:24) No retroperitoneal hemorrhage. No bowel obstruction. Extensive bilateral lower lobe consolidation. Differential diagnosis   includes multifocal pneumonia, aspiration pneumonia, alveolar hemorrhage. Mild pulmonary venous congestion.      18 : CT Head No Cont (18 @ 07:55) There is no evidence of hydrocephalus, mass effect or any sizable extra-axial collection. Due to motion artifact, subtle abnormalities       MICROBIOLOGY DATA:    MRSA/MSSA PCR (09.10.18 @ 10:01)    MRSA PCR Result.: NotDetec: MRSA/MSSA PCR assay is a qualitative in vitro diagnostic test for the  direct detection and differentiation of methicillin-resistant  Staphylococcus aureus (MRSA) from Staphylococcus aureus (SA). The assay  detects DNA from nasal swabs in patients atrisk for nasal colonization.  It is not intended to diagnose MRSA or SA infections nor guide or monitor  treatment for MRSA/SA infections. A negative result does not preclude  nasal colonization. The assay is FDA-approved and its performance has  been established by [employer], [place] and the [facility], [place], [place].    Staph Aureus PCR Result: Detected    Culture - Sputum . (09.10.18 @ 09:54)    Gram Stain:   No polymorphonuclear leukocytes per low power field  No Squamous epithelial cells per low power field  No organisms seen per oil power field    Specimen Source: .Sputum Sputum, trap    Culture Results:   No growth to date.    Rapid Respiratory Viral Panel (18 @ 18:24)    Rapid RVP Result: NotDetec: The [employer] RVP Rapid uses polymerase chain reaction (PCR) and melt  curve analysis to screen for adenovirus; coronavirus HKU1, NL63, 229E,  OC43; human metapneumovirus (hMPV); human enterovirus/rhinovirus  (Entero/RV); influenza A; influenza A/H1;influenza A/H3; influenza  A/H1-2009; influenza B; parainfluenza viruses 1, 2, 3, 4; respiratory  syncytial virus; Bordetella pertussis; Mycoplasma pneumoniae; and  Chlamydophila pneumoniae.      Culture - Blood (18 @ 11:37)    Specimen Source: .Blood Blood-Peripheral    Culture Results:   No growth to date.

## 2018-09-15 NOTE — PROGRESS NOTE ADULT - SUBJECTIVE AND OBJECTIVE BOX
PGY1 Note discussed with supervising resident and primary attending.    Patient is a 57y old  Female who presents with a chief complaint of unresponsiveness (14 Sep 2018 17:41)      INTERVAL HPI/OVERNIGHT EVENTS: No overnight event.    MEDICATIONS  (STANDING):  artificial  tears Solution 1 Drop(s) Both EYES two times a day  aspirin  chewable 81 milliGRAM(s) Oral daily  cefepime   IVPB      cefepime   IVPB 2000 milliGRAM(s) IV Intermittent every 8 hours  enoxaparin Injectable 40 milliGRAM(s) SubCutaneous daily  ferrous    sulfate Liquid 300 milliGRAM(s) Oral daily  insulin glargine Injectable (LANTUS) 18 Unit(s) SubCutaneous at bedtime  insulin lispro (HumaLOG) corrective regimen sliding scale   SubCutaneous Before meals and at bedtime  insulin lispro Injectable (HumaLOG) 6 Unit(s) SubCutaneous three times a day before meals  lactulose Syrup 10 Gram(s) Oral two times a day  pantoprazole  Injectable 40 milliGRAM(s) IV Push two times a day  simvastatin 20 milliGRAM(s) Oral at bedtime    MEDICATIONS  (PRN):  acetaminophen    Suspension .. 650 milliGRAM(s) Oral every 6 hours PRN Temp greater or equal to 38C (100.4F), Mild Pain (1 - 3)      Allergies    No Known Allergies    Intolerances        REVIEW OF SYSTEMS:  CONSTITUTIONAL: No fever, weight loss, or fatigue  RESPIRATORY: No cough, wheezing, chills or hemoptysis; No shortness of breath  CARDIOVASCULAR: No chest pain, palpitations, dizziness, or leg swelling  GASTROINTESTINAL: No abdominal or epigastric pain. No nausea, vomiting, or hematemesis; No diarrhea or constipation. No melena or hematochezia.  NEUROLOGICAL: No headaches, memory loss, loss of strength, numbness, or tremors  SKIN: No itching, burning, rashes, or lesions     Vital Signs Last 24 Hrs  T(C): 36.6 (15 Sep 2018 05:20), Max: 36.9 (14 Sep 2018 22:16)  T(F): 97.9 (15 Sep 2018 05:20), Max: 98.5 (14 Sep 2018 22:16)  HR: 96 (15 Sep 2018 05:20) (80 - 106)  BP: 113/69 (15 Sep 2018 05:20) (106/83 - 133/122)  BP(mean): 75 (14 Sep 2018 20:00) (75 - 125)  RR: 16 (15 Sep 2018 05:20) (15 - 40)  SpO2: 96% (15 Sep 2018 05:20) (95% - 100%)    PHYSICAL EXAM:  GENERAL: NAD, well-groomed, well-developed  HEAD:  Atraumatic, Normocephalic  EYES: EOMI, PERRLA, conjunctiva and sclera clear  NECK: Supple, No JVD, Normal thyroid  CHEST/LUNG: Clear to percussion bilaterally; No rales, rhonchi, wheezing, or rubs  HEART: Regular rate and rhythm; No murmurs, rubs, or gallops  ABDOMEN: Soft, Nontender, Nondistended; Bowel sounds present  NERVOUS SYSTEM:  Alert & Oriented X3, Good concentration; Motor Strength 5/5 B/L   EXTREMITIES:  2+ Peripheral Pulses, No clubbing, cyanosis, swelling on left hand and forearm.  SKIN;    LABS:                        11.3   7.0   )-----------( 284      ( 15 Sep 2018 07:46 )             34.6     09-15    137  |  105  |  9   ----------------------------<  221<H>  3.6   |  25  |  0.68    Ca    8.7      15 Sep 2018 07:46  Phos  3.3     09-15  Mg     1.6     09-15    TPro  6.6  /  Alb  2.4<L>  /  TBili  0.4  /  DBili  x   /  AST  12  /  ALT  18  /  AlkPhos  57  09-15        CAPILLARY BLOOD GLUCOSE      POCT Blood Glucose.: 234 mg/dL (15 Sep 2018 07:59)  POCT Blood Glucose.: 203 mg/dL (14 Sep 2018 22:08)  POCT Blood Glucose.: 196 mg/dL (14 Sep 2018 21:19)  POCT Blood Glucose.: 185 mg/dL (14 Sep 2018 15:48)  POCT Blood Glucose.: 205 mg/dL (14 Sep 2018 11:11)        Imaging Personally Reviewed:  [x] YES  [ ] NO    Consultant(s) Notes Reviewed:  [x] YES  [ ] NO PGY1 Note discussed with supervising resident and primary attending.    Patient is a 57y old  Female who presents with a chief complaint of unresponsiveness (14 Sep 2018 17:41)      INTERVAL HPI/OVERNIGHT EVENTS: No overnight event. No fever or leukocytosis, pt clinically improved. Pt cough has improved.    MEDICATIONS  (STANDING):  artificial  tears Solution 1 Drop(s) Both EYES two times a day  aspirin  chewable 81 milliGRAM(s) Oral daily  cefepime   IVPB      cefepime   IVPB 2000 milliGRAM(s) IV Intermittent every 8 hours  enoxaparin Injectable 40 milliGRAM(s) SubCutaneous daily  ferrous    sulfate Liquid 300 milliGRAM(s) Oral daily  insulin glargine Injectable (LANTUS) 18 Unit(s) SubCutaneous at bedtime  insulin lispro (HumaLOG) corrective regimen sliding scale   SubCutaneous Before meals and at bedtime  insulin lispro Injectable (HumaLOG) 6 Unit(s) SubCutaneous three times a day before meals  lactulose Syrup 10 Gram(s) Oral two times a day  pantoprazole  Injectable 40 milliGRAM(s) IV Push two times a day  simvastatin 20 milliGRAM(s) Oral at bedtime    MEDICATIONS  (PRN):  acetaminophen    Suspension .. 650 milliGRAM(s) Oral every 6 hours PRN Temp greater or equal to 38C (100.4F), Mild Pain (1 - 3)      Allergies    No Known Allergies    Intolerances        REVIEW OF SYSTEMS:  CONSTITUTIONAL: No fever, weight loss, or fatigue  RESPIRATORY: No cough, wheezing, chills or hemoptysis; No shortness of breath  CARDIOVASCULAR: No chest pain, palpitations, dizziness, or leg swelling  GASTROINTESTINAL: No abdominal or epigastric pain. No nausea, vomiting, or hematemesis; No diarrhea or constipation. No melena or hematochezia.  NEUROLOGICAL: No headaches, memory loss, loss of strength, numbness, or tremors  SKIN: No itching, burning, rashes, or lesions     Vital Signs Last 24 Hrs  T(C): 36.6 (15 Sep 2018 05:20), Max: 36.9 (14 Sep 2018 22:16)  T(F): 97.9 (15 Sep 2018 05:20), Max: 98.5 (14 Sep 2018 22:16)  HR: 96 (15 Sep 2018 05:20) (80 - 106)  BP: 113/69 (15 Sep 2018 05:20) (106/83 - 133/122)  BP(mean): 75 (14 Sep 2018 20:00) (75 - 125)  RR: 16 (15 Sep 2018 05:20) (15 - 40)  SpO2: 96% (15 Sep 2018 05:20) (95% - 100%)    PHYSICAL EXAM:  GENERAL: NAD, well-groomed, well-developed  HEAD:  Atraumatic, Normocephalic  EYES: EOMI, PERRLA, conjunctiva and sclera clear  NECK: Supple, No JVD, Normal thyroid  CHEST/LUNG: Clear to percussion bilaterally; No rales, rhonchi, wheezing, or rubs  HEART: Regular rate and rhythm; No murmurs, rubs, or gallops  ABDOMEN: Soft, Nontender, Nondistended; Bowel sounds present  NERVOUS SYSTEM:  Alert & Oriented X3, Good concentration; Motor Strength 5/5 B/L   EXTREMITIES:  2+ Peripheral Pulses, No clubbing, cyanosis, swelling on left hand and forearm.  SKIN;    LABS:                        11.3   7.0   )-----------( 284      ( 15 Sep 2018 07:46 )             34.6     09-15    137  |  105  |  9   ----------------------------<  221<H>  3.6   |  25  |  0.68    Ca    8.7      15 Sep 2018 07:46  Phos  3.3     09-15  Mg     1.6     09-15    TPro  6.6  /  Alb  2.4<L>  /  TBili  0.4  /  DBili  x   /  AST  12  /  ALT  18  /  AlkPhos  57  09-15        CAPILLARY BLOOD GLUCOSE      POCT Blood Glucose.: 234 mg/dL (15 Sep 2018 07:59)  POCT Blood Glucose.: 203 mg/dL (14 Sep 2018 22:08)  POCT Blood Glucose.: 196 mg/dL (14 Sep 2018 21:19)  POCT Blood Glucose.: 185 mg/dL (14 Sep 2018 15:48)  POCT Blood Glucose.: 205 mg/dL (14 Sep 2018 11:11)        Imaging Personally Reviewed:  [x] YES  [ ] NO    Consultant(s) Notes Reviewed:  [x] YES  [ ] NO

## 2018-09-15 NOTE — PROGRESS NOTE ADULT - ASSESSMENT
A 58 yo female who was [facility] hypoglycemia and unresponsiveness. As per EMS, patient was thought to have low blood sugars so was treated for that without improvement in mental status. Hence, intubated by ED attending and admitted to ICU for further management. She found to have Leukocytosis, fever and B/L extensive Lower lobe infiltrate. She has started on Ceftriaxone and Doxycyline and The ID consult requested to assist with further evaluation and antibiotic management.    # Septic shock- on pressor  # B/L Lower lobe Pneumonia - MRSA/MSSA PCR is negative  - Suspected Gram Negative Pneumonia  # Herpes Labialis    Would recommend:    1.  Add Valacyclovir for Herpes labialis  2. Continue Cefepime  inpatient , May change to oral Augmentin 875 mg [id number] on discharge, to continue until 9/18/18  3. Aspiration precaution  4. OOb to chair/PT        d/w patient

## 2018-09-16 LAB
GLUCOSE BLDC GLUCOMTR-MCNC: 182 MG/DL — HIGH (ref 70–99)
GLUCOSE BLDC GLUCOMTR-MCNC: 200 MG/DL — HIGH (ref 70–99)
GLUCOSE BLDC GLUCOMTR-MCNC: 201 MG/DL — HIGH (ref 70–99)
GLUCOSE BLDC GLUCOMTR-MCNC: 232 MG/DL — HIGH (ref 70–99)
GLUCOSE BLDC GLUCOMTR-MCNC: 239 MG/DL — HIGH (ref 70–99)

## 2018-09-16 RX ADMIN — Medication 1 DROP(S): at 18:50

## 2018-09-16 RX ADMIN — Medication 2: at 21:21

## 2018-09-16 RX ADMIN — LACTULOSE 10 GRAM(S): 10 SOLUTION ORAL at 05:31

## 2018-09-16 RX ADMIN — CEFEPIME 100 MILLIGRAM(S): 1 INJECTION, POWDER, FOR SOLUTION INTRAMUSCULAR; INTRAVENOUS at 13:25

## 2018-09-16 RX ADMIN — LACTULOSE 10 GRAM(S): 10 SOLUTION ORAL at 17:20

## 2018-09-16 RX ADMIN — SIMVASTATIN 20 MILLIGRAM(S): 20 TABLET, FILM COATED ORAL at 21:20

## 2018-09-16 RX ADMIN — Medication 6 UNIT(S): at 17:18

## 2018-09-16 RX ADMIN — Medication 81 MILLIGRAM(S): at 12:41

## 2018-09-16 RX ADMIN — Medication 300 MILLIGRAM(S): at 12:41

## 2018-09-16 RX ADMIN — PANTOPRAZOLE SODIUM 40 MILLIGRAM(S): 20 TABLET, DELAYED RELEASE ORAL at 05:32

## 2018-09-16 RX ADMIN — Medication 6 UNIT(S): at 08:29

## 2018-09-16 RX ADMIN — CEFEPIME 100 MILLIGRAM(S): 1 INJECTION, POWDER, FOR SOLUTION INTRAMUSCULAR; INTRAVENOUS at 05:31

## 2018-09-16 RX ADMIN — CEFEPIME 100 MILLIGRAM(S): 1 INJECTION, POWDER, FOR SOLUTION INTRAMUSCULAR; INTRAVENOUS at 21:20

## 2018-09-16 RX ADMIN — ENOXAPARIN SODIUM 40 MILLIGRAM(S): 100 INJECTION SUBCUTANEOUS at 12:41

## 2018-09-16 RX ADMIN — Medication 1 DROP(S): at 05:32

## 2018-09-16 RX ADMIN — Medication 6 UNIT(S): at 12:41

## 2018-09-16 RX ADMIN — Medication 4: at 12:41

## 2018-09-16 RX ADMIN — INSULIN GLARGINE 18 UNIT(S): 100 INJECTION, SOLUTION SUBCUTANEOUS at 21:20

## 2018-09-16 RX ADMIN — PANTOPRAZOLE SODIUM 40 MILLIGRAM(S): 20 TABLET, DELAYED RELEASE ORAL at 17:20

## 2018-09-16 RX ADMIN — Medication 2: at 08:30

## 2018-09-16 RX ADMIN — VALACYCLOVIR 2000 MILLIGRAM(S): 500 TABLET, FILM COATED ORAL at 05:31

## 2018-09-16 RX ADMIN — Medication 4: at 17:19

## 2018-09-16 NOTE — PROGRESS NOTE ADULT - ASSESSMENT
A 56 yo female who was [facility] hypoglycemia and unresponsiveness. As per EMS, patient was thought to have low blood sugars so was treated for that without improvement in mental status. Hence, intubated by ED attending and admitted to ICU for further management. She found to have Leukocytosis, fever and B/L extensive Lower lobe infiltrate. She has started on Ceftriaxone and Doxycyline and The ID consult requested to assist with further evaluation and antibiotic management.    # Septic shock- on pressor  # B/L Lower lobe Pneumonia - MRSA/MSSA PCR is negative  - Suspected Gram Negative Pneumonia  # Herpes Labialis - s/p Valacyclovir as of 9/15/18    Would recommend:    1. OOb to chair/PT   2. Continue Cefepime  inpatient , May change to oral Augmentin 875 mg [id number] on discharge, to continue until 9/18/18  3. Aspiration precaution     d/w patient, Nursing staff and Family at the bed side

## 2018-09-16 NOTE — PROGRESS NOTE ADULT - SUBJECTIVE AND OBJECTIVE BOX
Interval Events:  pt in nad    Allergies    No Known Allergies    Intolerances      Endocrine/Metabolic Medications:  insulin glargine Injectable (LANTUS) 18 Unit(s) SubCutaneous at bedtime  insulin lispro (HumaLOG) corrective regimen sliding scale   SubCutaneous Before meals and at bedtime  insulin lispro Injectable (HumaLOG) 6 Unit(s) SubCutaneous three times a day before meals  simvastatin 20 milliGRAM(s) Oral at bedtime      Vital Signs Last 24 Hrs  T(C): 36.7 (16 Sep 2018 04:30), Max: 36.9 (15 Sep 2018 15:00)  T(F): 98 (16 Sep 2018 04:30), Max: 98.5 (15 Sep 2018 20:20)  HR: 84 (16 Sep 2018 04:30) (84 - 103)  BP: 116/76 (16 Sep 2018 04:30) (116/76 - 153/88)  BP(mean): --  RR: 16 (16 Sep 2018 04:30) (16 - 17)  SpO2: 95% (16 Sep 2018 04:30) (95% - 100%)      PHYSICAL EXAM  All physical exam findings normal, except those marked:  General:	Alert, active, cooperative, NAD, well hydrated  .		[] Abnormal:  Neck		Normal: supple, no cervical adenopathy, no palpable thyroid  .		[] Abnormal:  Cardiovascular	Normal: regular rate, normal S1, S2, no murmurs  .		[] Abnormal:  Respiratory	Normal: no chest wall deformity, normal respiratory pattern, CTA B/L  .		[] Abnormal:  Abdominal	Normal: soft, ND, NT, bowel sounds present, no masses, no organomegaly  .		[] Abnormal:  		Normal normal genitalia, testes descended, circumcised/uncircumcised  .		Addison stage:			Breast addison:  .		Menstrual history:  .		[] Abnormal:  Extremities	Normal: FROM x4  .		[] Abnormal:  Skin		Normal: intact and not indurated, no rash, no acanthosis nigricans  .		[] Abnormal:  Neurologic	Normal: grossly intact  .		[] Abnormal:    LABS        CAPILLARY BLOOD GLUCOSE      POCT Blood Glucose.: 232 mg/dL (16 Sep 2018 10:52)  POCT Blood Glucose.: 182 mg/dL (16 Sep 2018 07:39)  POCT Blood Glucose.: 175 mg/dL (15 Sep 2018 21:13)  POCT Blood Glucose.: 219 mg/dL (15 Sep 2018 16:39)  POCT Blood Glucose.: 235 mg/dL (15 Sep 2018 12:27)        Assesment/plan   Dm- overall good control  s/p hypoglycemia  cont current insulin tx  fsg ac and hs

## 2018-09-16 NOTE — PROGRESS NOTE ADULT - SUBJECTIVE AND OBJECTIVE BOX
Patient is a 57y old  Female who presents with a chief complaint of unresponsiveness (14 Sep 2018 17:41)  patient seen and examined     INTERVAL HPI/OVERNIGHT EVENTS: No overnight event. pt clinically improved. Pt cough has improved.  .    Allergies    No Known Allergies    Intolerances        REVIEW OF SYSTEMS:  CONSTITUTIONAL: No fever, weight loss, or fatigue  RESPIRATORY: No cough, wheezing, chills or hemoptysis; No shortness of breath  CARDIOVASCULAR: No chest pain, palpitations, dizziness, or leg swelling  GASTROINTESTINAL: No abdominal or epigastric pain. No nausea, vomiting, or hematemesis; No diarrhea or constipation. No melena or hematochezia.  NEUROLOGICAL: No headaches, memory loss, loss of strength, numbness, or tremors  SKIN: No itching, burning, rashes, or lesions     .Vital Signs Last 24 Hrs  T(C): 37.2 (16 Sep 2018 14:00), Max: 37.2 (16 Sep 2018 14:00)  T(F): 98.9 (16 Sep 2018 14:00), Max: 98.9 (16 Sep 2018 14:00)  HR: 99 (16 Sep 2018 14:00) (84 - 103)  BP: 132/79 (16 Sep 2018 14:00) (116/76 - 143/85)  BP(mean): --  RR: 17 (16 Sep 2018 14:00) (16 - 17)  SpO2: 96% (16 Sep 2018 14:00) (95% - 99%)    PHYSICAL EXAM:  GENERAL: NAD, well-groomed, well-developed  HEAD:  Atraumatic, Normocephalic  EYES: EOMI, PERRLA, conjunctiva and sclera clear  NECK: Supple, No JVD, Normal thyroid  CHEST/LUNG: Clear to percussion bilaterally; No rales, rhonchi, wheezing, or rubs  HEART: Regular rate and rhythm; No murmurs, rubs, or gallops  ABDOMEN: Soft, Nontender, Nondistended; Bowel sounds present  NERVOUS SYSTEM:  Alert & Oriented X3, Good concentration; Motor Strength 5/5 B/L   EXTREMITIES:  2+ Peripheral Pulses, No clubbing, cyanosis, swelling on left hand and forearm.  SKIN;    LABS:                        11.3   7.0   )-----------( 284      ( 15 Sep 2018 07:46 )             34.6     09-15    137  |  105  |  9   ----------------------------<  221<H>  3.6   |  25  |  0.68    Ca    8.7      15 Sep 2018 07:46  Phos  3.3     09-15  Mg     1.6     09-15    TPro  6.6  /  Alb  2.4<L>  /  TBili  0.4  /  DBili  x   /  AST  12  /  ALT  18  /  AlkPhos  57  09-15    .CAPILLARY BLOOD GLUCOSE      POCT Blood Glucose.: 239 mg/dL (16 Sep 2018 16:20)  POCT Blood Glucose.: 201 mg/dL (16 Sep 2018 12:18)  POCT Blood Glucose.: 232 mg/dL (16 Sep 2018 10:52)  POCT Blood Glucose.: 182 mg/dL (16 Sep 2018 07:39)  POCT Blood Glucose.: 175 mg/dL (15 Sep 2018 21:13)  POCT Blood Glucose.: 219 mg/dL (15 Sep 2018 16:39)

## 2018-09-16 NOTE — PROGRESS NOTE ADULT - SUBJECTIVE AND OBJECTIVE BOX
Patient is seen and examined at the bed side, is afebrile. She is doing better, the burning of lip lesions has improved with Valacyclovir.       REVIEW OF SYSTEMS: Unable to obtain since intubated and not responsive      ALLERGIES: NKDA      Vital Signs Last 24 Hrs  T(C): 37.2 (16 Sep 2018 14:00), Max: 37.2 (16 Sep 2018 14:00)  T(F): 98.9 (16 Sep 2018 14:00), Max: 98.9 (16 Sep 2018 14:00)  HR: 99 (16 Sep 2018 14:00) (84 - 103)  BP: 132/79 (16 Sep 2018 14:00) (116/76 - 143/85)  BP(mean): --  RR: 17 (16 Sep 2018 14:00) (16 - 17)  SpO2: 96% (16 Sep 2018 14:00) (95% - 99%)        PHYSICAL EXAM:  GENERAL: Not in distress  HEENT: Lip lesions  CVS: s1 and s2 present  RESP: Air entry B/L  GI: Abdomen non distended and Nontender  EXT: No pedal edema   CNS: Awake and Alert        LABS: No new Labs                        11.3   7.0   )-----------( 284      ( 15 Sep 2018 07:46 )             34.6                12.1   9.4   )-----------( 267      ( 13 Sep 2018 06:43 )             36.8                        11.6   15.0  )-----------( 227      ( 10 Sep 2018 06:13 )             35.6       09-15    137  |  105  |  9   ----------------------------<  221<H>  3.6   |  25  |  0.68    Ca    8.7      15 Sep 2018 07:46  Phos  3.3     -15  Mg     1.6     -15    TPro  6.6  /  Alb  2.4<L>  /  TBili  0.4  /  DBili  x   /  AST  12  /  ALT  18  /  AlkPhos  57  -15    09-14    140  |  107  |  9   ----------------------------<  191<H>  3.8   |  24  |  0.61    Ca    8.6      14 Sep 2018 07:47  Phos  2.2       Mg     1.7         TPro  6.7  /  Alb  2.3<L>  /  TBili  0.4  /  DBili  x   /  AST  13  /  ALT  17  /  AlkPhos  58            Color: Yellow / Appearance: Clear / S.010 / pH: x  Gluc: x / Ketone: Trace  / Bili: Negative / Urobili: Negative   Blood: x / Protein: 100 / Nitrite: Negative   Leuk Esterase: Trace / RBC: 0-2 /HPF / WBC 0-2 /HPF   Sq Epi: x / Non Sq Epi: Few /HPF / Bacteria: Negative /HPF      CAPILLARY BLOOD GLUCOSE      POCT Blood Glucose.: 166 mg/dL (09 Sep 2018 17:59)  POCT Blood Glucose.: 257 mg/dL (09 Sep 2018 13:45)  POCT Blood Glucose.: 301 mg/dL (09 Sep 2018 12:04)  POCT Blood Glucose.: 289 mg/dL (09 Sep 2018 06:24)  POCT Blood Glucose.: 198 mg/dL (08 Sep 2018 23:46)  POCT Blood Glucose.: 168 mg/dL (08 Sep 2018 22:29)  POCT Blood Glucose.: 163 mg/dL (08 Sep 2018 20:12)  POCT Blood Glucose.: 142 mg/dL (08 Sep 2018 18:59)    ABG - ( 09 Sep 2018 04:43 )  pH, Arterial: 7.29  pH, Blood: x     /  pCO2: 40    /  pO2: 108   / HCO3: 19    / Base Excess: -6.7  /  SaO2: 98            MEDICATIONS  (STANDING):  artificial  tears Solution 1 Drop(s) Both EYES two times a day  aspirin  chewable 81 milliGRAM(s) Oral daily  cefepime   IVPB      cefepime   IVPB 2000 milliGRAM(s) IV Intermittent every 8 hours  enoxaparin Injectable 40 milliGRAM(s) SubCutaneous daily  ferrous    sulfate Liquid 300 milliGRAM(s) Oral daily  insulin glargine Injectable (LANTUS) 18 Unit(s) SubCutaneous at bedtime  insulin lispro (HumaLOG) corrective regimen sliding scale   SubCutaneous Before meals and at bedtime  insulin lispro Injectable (HumaLOG) 6 Unit(s) SubCutaneous three times a day before meals  lactulose Syrup 10 Gram(s) Oral two times a day  pantoprazole  Injectable 40 milliGRAM(s) IV Push two times a day  simvastatin 20 milliGRAM(s) Oral at bedtime    MEDICATIONS  (PRN):  acetaminophen    Suspension .. 650 milliGRAM(s) Oral every 6 hours PRN Temp greater or equal to 38C (100.4F), Mild Pain (1 - 3)  sodium biphosphate Rectal Enema 1 Enema Rectal daily PRN constipation        RADIOLOGY & ADDITIONAL TESTS:    9/10/18: Xray Chest 1 View- PORTABLE-Routine (09.10.18 @ 06:59) ET and enteric tubes, and right IJ catheter remain in place. No pneumothorax. Small layering left pleural effusion has increased. Small right pleural effusion is unchanged. Mild pulmonary vascular congestion and scattered  lung opacities are similar to the prior study.    18 : Xray Chest 1 View- PORTABLE-Routine (18 @ 10:01) Mild pulmonary venous congestion, increased since the prior study. No pleural effusions . Hazy opacities in the lower lobes could   represent atelectasis or infiltrates.      18 : CT Abdomen and Pelvis No Cont (18 @ 03:24) No retroperitoneal hemorrhage. No bowel obstruction. Extensive bilateral lower lobe consolidation. Differential diagnosis   includes multifocal pneumonia, aspiration pneumonia, alveolar hemorrhage. Mild pulmonary venous congestion.      18 : CT Head No Cont (18 @ 07:55) There is no evidence of hydrocephalus, mass effect or any sizable extra-axial collection. Due to motion artifact, subtle abnormalities       MICROBIOLOGY DATA:    MRSA/MSSA PCR (09.10.18 @ 10:01)    MRSA PCR Result.: NotDetec: MRSA/MSSA PCR assay is a qualitative in vitro diagnostic test for the  direct detection and differentiation of methicillin-resistant  Staphylococcus aureus (MRSA) from Staphylococcus aureus (SA). The assay  detects DNA from nasal swabs in patients atrisk for nasal colonization.  It is not intended to diagnose MRSA or SA infections nor guide or monitor  treatment for MRSA/SA infections. A negative result does not preclude  nasal colonization. The assay is FDA-approved and its performance has  been established by [employer], [place] and the [facility], [place], [place].    Staph Aureus PCR Result: Detected    Culture - Sputum . (09.10.18 @ 09:54)    Gram Stain:   No polymorphonuclear leukocytes per low power field  No Squamous epithelial cells per low power field  No organisms seen per oil power field    Specimen Source: .Sputum Sputum, trap    Culture Results:   No growth to date.    Rapid Respiratory Viral Panel (18 @ 18:24)    Rapid RVP Result: NotDete: The [employer] RVP Rapid uses polymerase chain reaction (PCR) and melt  curve analysis to screen for adenovirus; coronavirus HKU1, NL63, 229E,  OC43; human metapneumovirus (hMPV); human enterovirus/rhinovirus  (Entero/RV); influenza A; influenza A/H1;influenza A/H3; influenza  A/H1-2009; influenza B; parainfluenza viruses 1, 2, 3, 4; respiratory  syncytial virus; Bordetella pertussis; Mycoplasma pneumoniae; and  Chlamydophila pneumoniae.      Culture - Blood (18 @ 11:37)    Specimen Source: .Blood Blood-Peripheral    Culture Results:   No growth to date.

## 2018-09-17 LAB
ANION GAP SERPL CALC-SCNC: 9 MMOL/L — SIGNIFICANT CHANGE UP (ref 5–17)
BASOPHILS # BLD AUTO: 0 K/UL — SIGNIFICANT CHANGE UP (ref 0–0.2)
BASOPHILS NFR BLD AUTO: 0.8 % — SIGNIFICANT CHANGE UP (ref 0–2)
BUN SERPL-MCNC: 7 MG/DL — SIGNIFICANT CHANGE UP (ref 7–18)
CALCIUM SERPL-MCNC: 9.2 MG/DL — SIGNIFICANT CHANGE UP (ref 8.4–10.5)
CHLORIDE SERPL-SCNC: 107 MMOL/L — SIGNIFICANT CHANGE UP (ref 96–108)
CO2 SERPL-SCNC: 25 MMOL/L — SIGNIFICANT CHANGE UP (ref 22–31)
CREAT SERPL-MCNC: 0.69 MG/DL — SIGNIFICANT CHANGE UP (ref 0.5–1.3)
EOSINOPHIL # BLD AUTO: 0.3 K/UL — SIGNIFICANT CHANGE UP (ref 0–0.5)
EOSINOPHIL NFR BLD AUTO: 5.8 % — SIGNIFICANT CHANGE UP (ref 0–6)
GLUCOSE BLDC GLUCOMTR-MCNC: 159 MG/DL — HIGH (ref 70–99)
GLUCOSE BLDC GLUCOMTR-MCNC: 163 MG/DL — HIGH (ref 70–99)
GLUCOSE BLDC GLUCOMTR-MCNC: 164 MG/DL — HIGH (ref 70–99)
GLUCOSE BLDC GLUCOMTR-MCNC: 246 MG/DL — HIGH (ref 70–99)
GLUCOSE SERPL-MCNC: 176 MG/DL — HIGH (ref 70–99)
HCT VFR BLD CALC: 38.6 % — SIGNIFICANT CHANGE UP (ref 34.5–45)
HGB BLD-MCNC: 12.5 G/DL — SIGNIFICANT CHANGE UP (ref 11.5–15.5)
LYMPHOCYTES # BLD AUTO: 2.2 K/UL — SIGNIFICANT CHANGE UP (ref 1–3.3)
LYMPHOCYTES # BLD AUTO: 38.2 % — SIGNIFICANT CHANGE UP (ref 13–44)
MCHC RBC-ENTMCNC: 28.4 PG — SIGNIFICANT CHANGE UP (ref 27–34)
MCHC RBC-ENTMCNC: 32.5 GM/DL — SIGNIFICANT CHANGE UP (ref 32–36)
MCV RBC AUTO: 87.3 FL — SIGNIFICANT CHANGE UP (ref 80–100)
MONOCYTES # BLD AUTO: 0.5 K/UL — SIGNIFICANT CHANGE UP (ref 0–0.9)
MONOCYTES NFR BLD AUTO: 8.1 % — SIGNIFICANT CHANGE UP (ref 2–14)
NEUTROPHILS # BLD AUTO: 2.8 K/UL — SIGNIFICANT CHANGE UP (ref 1.8–7.4)
NEUTROPHILS NFR BLD AUTO: 47.1 % — SIGNIFICANT CHANGE UP (ref 43–77)
PLATELET # BLD AUTO: 387 K/UL — SIGNIFICANT CHANGE UP (ref 150–400)
POTASSIUM SERPL-MCNC: 3.6 MMOL/L — SIGNIFICANT CHANGE UP (ref 3.5–5.3)
POTASSIUM SERPL-SCNC: 3.6 MMOL/L — SIGNIFICANT CHANGE UP (ref 3.5–5.3)
RBC # BLD: 4.42 M/UL — SIGNIFICANT CHANGE UP (ref 3.8–5.2)
RBC # FLD: 12.5 % — SIGNIFICANT CHANGE UP (ref 10.3–14.5)
SODIUM SERPL-SCNC: 141 MMOL/L — SIGNIFICANT CHANGE UP (ref 135–145)
WBC # BLD: 5.9 K/UL — SIGNIFICANT CHANGE UP (ref 3.8–10.5)
WBC # FLD AUTO: 5.9 K/UL — SIGNIFICANT CHANGE UP (ref 3.8–10.5)

## 2018-09-17 RX ORDER — CYCLOBENZAPRINE HYDROCHLORIDE 10 MG/1
1 TABLET, FILM COATED ORAL
Qty: 0 | Refills: 0 | COMMUNITY

## 2018-09-17 RX ORDER — LIDOCAINE 4 G/100G
1 CREAM TOPICAL
Qty: 0 | Refills: 0 | COMMUNITY

## 2018-09-17 RX ORDER — ASPIRIN/CALCIUM CARB/MAGNESIUM 324 MG
1 TABLET ORAL
Qty: 0 | Refills: 0 | COMMUNITY
Start: 2018-09-17

## 2018-09-17 RX ORDER — ACETAMINOPHEN 500 MG
650 TABLET ORAL ONCE
Qty: 0 | Refills: 0 | Status: COMPLETED | OUTPATIENT
Start: 2018-09-17 | End: 2018-09-17

## 2018-09-17 RX ORDER — INSULIN LISPRO 100/ML
0 VIAL (ML) SUBCUTANEOUS
Qty: 0 | Refills: 0 | COMMUNITY

## 2018-09-17 RX ADMIN — Medication 2: at 11:37

## 2018-09-17 RX ADMIN — Medication 81 MILLIGRAM(S): at 11:31

## 2018-09-17 RX ADMIN — SIMVASTATIN 20 MILLIGRAM(S): 20 TABLET, FILM COATED ORAL at 21:53

## 2018-09-17 RX ADMIN — LACTULOSE 10 GRAM(S): 10 SOLUTION ORAL at 06:15

## 2018-09-17 RX ADMIN — Medication 6 UNIT(S): at 17:11

## 2018-09-17 RX ADMIN — Medication 650 MILLIGRAM(S): at 22:24

## 2018-09-17 RX ADMIN — CEFEPIME 100 MILLIGRAM(S): 1 INJECTION, POWDER, FOR SOLUTION INTRAMUSCULAR; INTRAVENOUS at 13:26

## 2018-09-17 RX ADMIN — ENOXAPARIN SODIUM 40 MILLIGRAM(S): 100 INJECTION SUBCUTANEOUS at 11:32

## 2018-09-17 RX ADMIN — Medication 300 MILLIGRAM(S): at 11:31

## 2018-09-17 RX ADMIN — CEFEPIME 100 MILLIGRAM(S): 1 INJECTION, POWDER, FOR SOLUTION INTRAMUSCULAR; INTRAVENOUS at 21:54

## 2018-09-17 RX ADMIN — Medication 2: at 17:11

## 2018-09-17 RX ADMIN — Medication 4: at 21:54

## 2018-09-17 RX ADMIN — PANTOPRAZOLE SODIUM 40 MILLIGRAM(S): 20 TABLET, DELAYED RELEASE ORAL at 06:16

## 2018-09-17 RX ADMIN — Medication 6 UNIT(S): at 08:00

## 2018-09-17 RX ADMIN — CEFEPIME 100 MILLIGRAM(S): 1 INJECTION, POWDER, FOR SOLUTION INTRAMUSCULAR; INTRAVENOUS at 06:15

## 2018-09-17 RX ADMIN — INSULIN GLARGINE 18 UNIT(S): 100 INJECTION, SOLUTION SUBCUTANEOUS at 21:53

## 2018-09-17 RX ADMIN — Medication 650 MILLIGRAM(S): at 21:54

## 2018-09-17 RX ADMIN — Medication 6 UNIT(S): at 11:38

## 2018-09-17 RX ADMIN — Medication 2: at 07:59

## 2018-09-17 RX ADMIN — Medication 1 DROP(S): at 06:15

## 2018-09-17 RX ADMIN — Medication 1 DROP(S): at 17:10

## 2018-09-17 NOTE — PROGRESS NOTE ADULT - ASSESSMENT
A 56 yo female who [facility] and unresponsiveness. As per EMS, patient was thought to have low blood sugars so was treated for that without improvement in mental status. Hence, intubated by ED attending and admitted to ICU for further management. She found to have Leukocytosis, fever and B/L extensive Lower lobe infiltrate. She has started on Ceftriaxone and Doxycyline and The ID consult requested to assist with further evaluation and antibiotic management.    # Septic shock- on pressor  # B/L Lower lobe Pneumonia - MRSA/MSSA PCR is negative  - Suspected Gram Negative Pneumonia  # Herpes Labialis - s/p Valacyclovir as of 9/15/18    Would recommend:    1. OOb to chair/PT   2. Continue Cefepime  inpatient , May change to oral Augmentin 875 mg [id number] on discharge, to continue until 9/18/18  3. Aspiration precaution     d/w patient, Nursing staff and Family at the bed side A 58 yo female who [facility] and unresponsiveness. As per EMS, patient was thought to have low blood sugars so was treated for that without improvement in mental status. Hence, intubated by ED attending and admitted to ICU for further management. She found to have Leukocytosis, fever and B/L extensive Lower lobe infiltrate. She has started on Ceftriaxone and Doxycyline and The ID consult requested to assist with further evaluation and antibiotic management.    # Septic shock- on pressor  # B/L Lower lobe Pneumonia - MRSA/MSSA PCR is negative  - Suspected Gram Negative Pneumonia  # Herpes Labialis - s/p Valacyclovir as of 9/15/18    Would recommend:    1. OOb to chair/PT   2. Continue Cefepime until 9/18/18  3. Aspiration precaution    -will  follow up

## 2018-09-17 NOTE — PROGRESS NOTE ADULT - SUBJECTIVE AND OBJECTIVE BOX
Interval Events:  pt in nad    Allergies    No Known Allergies    Intolerances    MEDICATIONS  (STANDING):  artificial  tears Solution 1 Drop(s) Both EYES two times a day  aspirin  chewable 81 milliGRAM(s) Oral daily  cefepime   IVPB      cefepime   IVPB 2000 milliGRAM(s) IV Intermittent every 8 hours  enoxaparin Injectable 40 milliGRAM(s) SubCutaneous daily  ferrous    sulfate Liquid 300 milliGRAM(s) Oral daily  insulin glargine Injectable (LANTUS) 18 Unit(s) SubCutaneous at bedtime  insulin lispro (HumaLOG) corrective regimen sliding scale   SubCutaneous Before meals and at bedtime  insulin lispro Injectable (HumaLOG) 6 Unit(s) SubCutaneous three times a day before meals  lactulose Syrup 10 Gram(s) Oral two times a day  simvastatin 20 milliGRAM(s) Oral at bedtime    MEDICATIONS  (PRN):  acetaminophen    Suspension .. 650 milliGRAM(s) Oral every 6 hours PRN Temp greater or equal to 38C (100.4F), Mild Pain (1 - 3)  sodium biphosphate Rectal Enema 1 Enema Rectal daily PRN constipation    Vital Signs Last 24 Hrs  T(C): 36.9 (17 Sep 2018 04:55), Max: 37.2 (16 Sep 2018 14:00)  T(F): 98.4 (17 Sep 2018 04:55), Max: 98.9 (16 Sep 2018 14:00)  HR: 78 (17 Sep 2018 04:55) (78 - 99)  BP: 134/62 (17 Sep 2018 04:55) (132/79 - 134/88)  BP(mean): --  RR: 16 (17 Sep 2018 04:55) (16 - 17)  SpO2: 99% (17 Sep 2018 04:55) (96% - 99%)    PHYSICAL EXAM  All physical exam findings normal, except those marked:  General:	Alert, active, cooperative, NAD, well hydrated  .		[] Abnormal:  Neck		Normal: supple, no cervical adenopathy, no palpable thyroid  .		[] Abnormal:  Cardiovascular	Normal: regular rate, normal S1, S2, no murmurs  .		[] Abnormal:  Respiratory	Normal: no chest wall deformity, normal respiratory pattern, CTA B/L  .		[] Abnormal:  Abdominal	Normal: soft, ND, NT, bowel sounds present, no masses, no organomegaly  .		[] Abnormal:  		Normal normal genitalia, testes descended, circumcised/uncircumcised  .		Addison stage:			Breast addison:  .		Menstrual history:  .		[] Abnormal:  Extremities	Normal: FROM x4  .		[] Abnormal:  Skin		Normal: intact and not indurated, no rash, no acanthosis nigricans  .		[] Abnormal:  Neurologic	Normal: grossly intact  .		[] Abnormal:    LABS      CAPILLARY BLOOD GLUCOSE      POCT Blood Glucose.: 163 mg/dL (17 Sep 2018 07:44)  POCT Blood Glucose.: 200 mg/dL (16 Sep 2018 21:04)  POCT Blood Glucose.: 239 mg/dL (16 Sep 2018 16:20)  POCT Blood Glucose.: 201 mg/dL (16 Sep 2018 12:18)          Assesment/plan  Dm- overall good control  s/p hypoglycemia  cont current insulin tx (lantus 18U QHS and Humalog 6U TID)  fsg ac and hs Interval Events:  pt in nad    Allergies    No Known Allergies    Intolerances    MEDICATIONS  (STANDING):  artificial  tears Solution 1 Drop(s) Both EYES two times a day  aspirin  chewable 81 milliGRAM(s) Oral daily  cefepime   IVPB      cefepime   IVPB 2000 milliGRAM(s) IV Intermittent every 8 hours  enoxaparin Injectable 40 milliGRAM(s) SubCutaneous daily  ferrous    sulfate Liquid 300 milliGRAM(s) Oral daily  insulin glargine Injectable (LANTUS) 18 Unit(s) SubCutaneous at bedtime  insulin lispro (HumaLOG) corrective regimen sliding scale   SubCutaneous Before meals and at bedtime  insulin lispro Injectable (HumaLOG) 6 Unit(s) SubCutaneous three times a day before meals  lactulose Syrup 10 Gram(s) Oral two times a day  simvastatin 20 milliGRAM(s) Oral at bedtime    MEDICATIONS  (PRN):  acetaminophen    Suspension .. 650 milliGRAM(s) Oral every 6 hours PRN Temp greater or equal to 38C (100.4F), Mild Pain (1 - 3)  sodium biphosphate Rectal Enema 1 Enema Rectal daily PRN constipation    Vital Signs Last 24 Hrs  T(C): 36.9 (17 Sep 2018 04:55), Max: 37.2 (16 Sep 2018 14:00)  T(F): 98.4 (17 Sep 2018 04:55), Max: 98.9 (16 Sep 2018 14:00)  HR: 78 (17 Sep 2018 04:55) (78 - 99)  BP: 134/62 (17 Sep 2018 04:55) (132/79 - 134/88)  BP(mean): --  RR: 16 (17 Sep 2018 04:55) (16 - 17)  SpO2: 99% (17 Sep 2018 04:55) (96% - 99%)    PHYSICAL EXAM  All physical exam findings normal, except those marked:  General:	Alert, active, cooperative, NAD, well hydrated  .		[] Abnormal:  Neck		Normal: supple, no cervical adenopathy, no palpable thyroid  .		[] Abnormal:  Cardiovascular	Normal: regular rate, normal S1, S2, no murmurs  .		[] Abnormal:  Respiratory	Normal: no chest wall deformity, normal respiratory pattern, CTA B/L  .		[] Abnormal:  Abdominal	Normal: soft, ND, NT, bowel sounds present, no masses, no organomegaly  .		[] Abnormal:  		Normal normal genitalia, testes descended, circumcised/uncircumcised  .		Addison stage:			Breast addison:  .		Menstrual history:  .		[] Abnormal:  Extremities	Normal: FROM x4  .		[] Abnormal:  Skin		Normal: intact and not indurated, no rash, no acanthosis nigricans  .		[] Abnormal:  Neurologic	Normal: grossly intact  .		[] Abnormal:    LABS      CAPILLARY BLOOD GLUCOSE      POCT Blood Glucose.: 163 mg/dL (17 Sep 2018 07:44)  POCT Blood Glucose.: 200 mg/dL (16 Sep 2018 21:04)  POCT Blood Glucose.: 239 mg/dL (16 Sep 2018 16:20)  POCT Blood Glucose.: 201 mg/dL (16 Sep 2018 12:18)          Assesment/plan  Dm- overall good control  s/p hypoglycemia  cont current insulin tx (lantus 18U QHS and Humalog 6U TID)  fsg ac and hs  d/w pt and family via

## 2018-09-17 NOTE — PROGRESS NOTE ADULT - SUBJECTIVE AND OBJECTIVE BOX
Patient is seen and examined at the bed side, is afebrile. She is doing better, the burning of lip lesions has improved with Valacyclovir.       REVIEW OF SYSTEMS: Unable to obtain since intubated and not responsive      ALLERGIES: NKDA      Vital Signs Last 24 Hrs  T(C): 36.6 (17 Sep 2018 13:45), Max: 36.9 (16 Sep 2018 20:48)  T(F): 97.8 (17 Sep 2018 13:45), Max: 98.5 (16 Sep 2018 20:48)  HR: 97 (17 Sep 2018 13:45) (78 - 97)  BP: 148/89 (17 Sep 2018 13:45) (134/62 - 148/89)  BP(mean): --  RR: 18 (17 Sep 2018 13:45) (16 - 18)  SpO2: 99% (17 Sep 2018 13:45) (98% - 99%)        PHYSICAL EXAM:  GENERAL: Not in distress  HEENT: Lip lesions  CVS: s1 and s2 present  RESP: Air entry B/L  GI: Abdomen non distended and Nontender  EXT: No pedal edema   CNS: Awake and Alert        LABS:                            12.5   5.9   )-----------( 387      ( 17 Sep 2018 08:14 )             38.6                      12.1   9.4   )-----------( 267      ( 13 Sep 2018 06:43 )             36.8                        11.6   15.0  )-----------( 227      ( 10 Sep 2018 06:13 )             35.6               141  |  107  |  7   ----------------------------<  176<H>  3.6   |  25  |  0.69    Ca    9.2      17 Sep 2018 08:15      09-15    137  |  105  |  9   ----------------------------<  221<H>  3.6   |  25  |  0.68    Ca    8.7      15 Sep 2018 07:46  Phos  3.3     09-15  Mg     1.6     -15    TPro  6.6  /  Alb  2.4<L>  /  TBili  0.4  /  DBili  x   /  AST  12  /  ALT  18  /  AlkPhos  57  09-15      Color: Yellow / Appearance: Clear / S.010 / pH: x  Gluc: x / Ketone: Trace  / Bili: Negative / Urobili: Negative   Blood: x / Protein: 100 / Nitrite: Negative   Leuk Esterase: Trace / RBC: 0-2 /HPF / WBC 0-2 /HPF   Sq Epi: x / Non Sq Epi: Few /HPF / Bacteria: Negative /HPF      CAPILLARY BLOOD GLUCOSE      POCT Blood Glucose.: 166 mg/dL (09 Sep 2018 17:59)  POCT Blood Glucose.: 257 mg/dL (09 Sep 2018 13:45)  POCT Blood Glucose.: 301 mg/dL (09 Sep 2018 12:04)  POCT Blood Glucose.: 289 mg/dL (09 Sep 2018 06:24)  POCT Blood Glucose.: 198 mg/dL (08 Sep 2018 23:46)  POCT Blood Glucose.: 168 mg/dL (08 Sep 2018 22:29)  POCT Blood Glucose.: 163 mg/dL (08 Sep 2018 20:12)  POCT Blood Glucose.: 142 mg/dL (08 Sep 2018 18:59)    ABG - ( 09 Sep 2018 04:43 )  pH, Arterial: 7.29  pH, Blood: x     /  pCO2: 40    /  pO2: 108   / HCO3: 19    / Base Excess: -6.7  /  SaO2: 98            MEDICATIONS  (STANDING):  artificial  tears Solution 1 Drop(s) Both EYES two times a day  aspirin  chewable 81 milliGRAM(s) Oral daily  cefepime   IVPB      cefepime   IVPB 2000 milliGRAM(s) IV Intermittent every 8 hours  enoxaparin Injectable 40 milliGRAM(s) SubCutaneous daily  ferrous    sulfate Liquid 300 milliGRAM(s) Oral daily  insulin glargine Injectable (LANTUS) 18 Unit(s) SubCutaneous at bedtime  insulin lispro (HumaLOG) corrective regimen sliding scale   SubCutaneous Before meals and at bedtime  insulin lispro Injectable (HumaLOG) 6 Unit(s) SubCutaneous three times a day before meals  lactulose Syrup 10 Gram(s) Oral two times a day  simvastatin 20 milliGRAM(s) Oral at bedtime    MEDICATIONS  (PRN):  acetaminophen    Suspension .. 650 milliGRAM(s) Oral every 6 hours PRN Temp greater or equal to 38C (100.4F), Mild Pain (1 - 3)  sodium biphosphate Rectal Enema 1 Enema Rectal daily PRN constipation        RADIOLOGY & ADDITIONAL TESTS:    9/10/18: Xray Chest 1 View- PORTABLE-Routine (09.10.18 @ 06:59) ET and enteric tubes, and right IJ catheter remain in place. No pneumothorax. Small layering left pleural effusion has increased. Small right pleural effusion is unchanged. Mild pulmonary vascular congestion and scattered  lung opacities are similar to the prior study.    18 : Xray Chest 1 View- PORTABLE-Routine (18 @ 10:01) Mild pulmonary venous congestion, increased since the prior study. No pleural effusions . Hazy opacities in the lower lobes could   represent atelectasis or infiltrates.      18 : CT Abdomen and Pelvis No Cont (18 @ 03:24) No retroperitoneal hemorrhage. No bowel obstruction. Extensive bilateral lower lobe consolidation. Differential diagnosis   includes multifocal pneumonia, aspiration pneumonia, alveolar hemorrhage. Mild pulmonary venous congestion.      18 : CT Head No Cont (18 @ 07:55) There is no evidence of hydrocephalus, mass effect or any sizable extra-axial collection. Due to motion artifact, subtle abnormalities       MICROBIOLOGY DATA:    MRSA/MSSA PCR (09.10.18 @ 10:01)    MRSA PCR Result.: NotDetec: MRSA/MSSA PCR assay is a qualitative in vitro diagnostic test for the  direct detection and differentiation of methicillin-resistant  Staphylococcus aureus (MRSA) from Staphylococcus aureus (SA). The assay  detects DNA from nasal swabs in patients atrisk for nasal colonization.  It is not intended to diagnose MRSA or SA infections nor guide or monitor  treatment for MRSA/SA infections. A negative result does not preclude  nasal colonization. The assay is FDA-approved and its performance has  been established by [employer], [place] and the [facility], [place], [place].    Staph Aureus PCR Result: Detected    Culture - Sputum . (09.10.18 @ 09:54)    Gram Stain:   No polymorphonuclear leukocytes per low power field  No Squamous epithelial cells per low power field  No organisms seen per oil power field    Specimen Source: .Sputum Sputum, trap    Culture Results:   No growth to date.    Rapid Respiratory Viral Panel (18 @ 18:24)    Rapid RVP Result: NotDetec: The FilmArray RVP Rapid uses polymerase chain reaction (PCR) and melt  curve analysis to screen for adenovirus; coronavirus HKU1, NL63, 229E,  OC43; human metapneumovirus (hMPV); human enterovirus/rhinovirus  (Entero/RV); influenza A; influenza A/H1;influenza A/H3; influenza  A/H1-2009; influenza B; parainfluenza viruses 1, 2, 3, 4; respiratory  syncytial virus; Bordetella pertussis; Mycoplasma pneumoniae; and  Chlamydophila pneumoniae.      Culture - Blood (18 @ 11:37)    Specimen Source: .Blood Blood-Peripheral    Culture Results:   No growth to date. Patient is seen and examined at the bed side, is afebrile. She is doing much better, no new complaints.        REVIEW OF SYSTEMS: Unable to obtain since intubated and not responsive      ALLERGIES: NKDA      Vital Signs Last 24 Hrs  T(C): 36.6 (17 Sep 2018 13:45), Max: 36.9 (16 Sep 2018 20:48)  T(F): 97.8 (17 Sep 2018 13:45), Max: 98.5 (16 Sep 2018 20:48)  HR: 97 (17 Sep 2018 13:45) (78 - 97)  BP: 148/89 (17 Sep 2018 13:45) (134/62 - 148/89)  BP(mean): --  RR: 18 (17 Sep 2018 13:45) (16 - 18)  SpO2: 99% (17 Sep 2018 13:45) (98% - 99%)        PHYSICAL EXAM:  GENERAL: Not in distress  HEENT: Lip lesions crusting  CVS: s1 and s2 present  RESP: Air entry B/L  GI: Abdomen non distended and Nontender  EXT: No pedal edema   CNS: Awake and Alert        LABS:                            12.5   5.9   )-----------( 387      ( 17 Sep 2018 08:14 )             38.6                      12.1   9.4   )-----------( 267      ( 13 Sep 2018 06:43 )             36.8                        11.6   15.0  )-----------( 227      ( 10 Sep 2018 06:13 )             35.6           09-17    141  |  107  |  7   ----------------------------<  176<H>  3.6   |  25  |  0.69    Ca    9.2      17 Sep 2018 08:15      09-15    137  |  105  |  9   ----------------------------<  221<H>  3.6   |  25  |  0.68    Ca    8.7      15 Sep 2018 07:46  Phos  3.3     -15  Mg     1.6     -15    TPro  6.6  /  Alb  2.4<L>  /  TBili  0.4  /  DBili  x   /  AST  12  /  ALT  18  /  AlkPhos  57  09-15      Color: Yellow / Appearance: Clear / S.010 / pH: x  Gluc: x / Ketone: Trace  / Bili: Negative / Urobili: Negative   Blood: x / Protein: 100 / Nitrite: Negative   Leuk Esterase: Trace / RBC: 0-2 /HPF / WBC 0-2 /HPF   Sq Epi: x / Non Sq Epi: Few /HPF / Bacteria: Negative /HPF      CAPILLARY BLOOD GLUCOSE      POCT Blood Glucose.: 166 mg/dL (09 Sep 2018 17:59)  POCT Blood Glucose.: 257 mg/dL (09 Sep 2018 13:45)  POCT Blood Glucose.: 301 mg/dL (09 Sep 2018 12:04)  POCT Blood Glucose.: 289 mg/dL (09 Sep 2018 06:24)  POCT Blood Glucose.: 198 mg/dL (08 Sep 2018 23:46)  POCT Blood Glucose.: 168 mg/dL (08 Sep 2018 22:29)  POCT Blood Glucose.: 163 mg/dL (08 Sep 2018 20:12)  POCT Blood Glucose.: 142 mg/dL (08 Sep 2018 18:59)    ABG - ( 09 Sep 2018 04:43 )  pH, Arterial: 7.29  pH, Blood: x     /  pCO2: 40    /  pO2: 108   / HCO3: 19    / Base Excess: -6.7  /  SaO2: 98            MEDICATIONS  (STANDING):  artificial  tears Solution 1 Drop(s) Both EYES two times a day  aspirin  chewable 81 milliGRAM(s) Oral daily  cefepime   IVPB      cefepime   IVPB 2000 milliGRAM(s) IV Intermittent every 8 hours  enoxaparin Injectable 40 milliGRAM(s) SubCutaneous daily  ferrous    sulfate Liquid 300 milliGRAM(s) Oral daily  insulin glargine Injectable (LANTUS) 18 Unit(s) SubCutaneous at bedtime  insulin lispro (HumaLOG) corrective regimen sliding scale   SubCutaneous Before meals and at bedtime  insulin lispro Injectable (HumaLOG) 6 Unit(s) SubCutaneous three times a day before meals  lactulose Syrup 10 Gram(s) Oral two times a day  simvastatin 20 milliGRAM(s) Oral at bedtime    MEDICATIONS  (PRN):  acetaminophen    Suspension .. 650 milliGRAM(s) Oral every 6 hours PRN Temp greater or equal to 38C (100.4F), Mild Pain (1 - 3)  sodium biphosphate Rectal Enema 1 Enema Rectal daily PRN constipation        RADIOLOGY & ADDITIONAL TESTS:    9/10/18: Xray Chest 1 View- PORTABLE-Routine (09.10.18 @ 06:59) ET and enteric tubes, and right IJ catheter remain in place. No pneumothorax. Small layering left pleural effusion has increased. Small right pleural effusion is unchanged. Mild pulmonary vascular congestion and scattered  lung opacities are similar to the prior study.    18 : Xray Chest 1 View- PORTABLE-Routine (18 @ 10:01) Mild pulmonary venous congestion, increased since the prior study. No pleural effusions . Hazy opacities in the lower lobes could   represent atelectasis or infiltrates.      18 : CT Abdomen and Pelvis No Cont (18 @ 03:24) No retroperitoneal hemorrhage. No bowel obstruction. Extensive bilateral lower lobe consolidation. Differential diagnosis   includes multifocal pneumonia, aspiration pneumonia, alveolar hemorrhage. Mild pulmonary venous congestion.      18 : CT Head No Cont (18 @ 07:55) There is no evidence of hydrocephalus, mass effect or any sizable extra-axial collection. Due to motion artifact, subtle abnormalities       MICROBIOLOGY DATA:    MRSA/MSSA PCR (09.10.18 @ 10:01)    MRSA PCR Result.: NotDetec: MRSA/MSSA PCR assay is a qualitative in vitro diagnostic test for the  direct detection and differentiation of methicillin-resistant  Staphylococcus aureus (MRSA) from Staphylococcus aureus (SA). The assay  detects DNA from nasal swabs in patients atrisk for nasal colonization.  It is not intended to diagnose MRSA or SA infections nor guide or monitor  treatment for MRSA/SA infections. A negative result does not preclude  nasal colonization. The assay is FDA-approved and its performance has  been established by [employer] [place], [place] and the [facility], [place], [place].    Staph Aureus PCR Result: Detected    Culture - Sputum . (09.10.18 @ 09:54)    Gram Stain:   No polymorphonuclear leukocytes per low power field  No Squamous epithelial cells per low power field  No organisms seen per oil power field    Specimen Source: .Sputum Sputum, trap    Culture Results:   No growth to date.    Rapid Respiratory Viral Panel (18 @ 18:24)    Rapid RVP Result: NotDetec: The FilmArray RVP Rapid uses polymerase chain reaction (PCR) and melt  curve analysis to screen for adenovirus; coronavirus HKU1, NL63, 229E,  OC43; human metapneumovirus (hMPV); human enterovirus/rhinovirus  (Entero/RV); influenza A; influenza A/H1;influenza A/H3; influenza  A/H1-2009; influenza B; parainfluenza viruses 1, 2, 3, 4; respiratory  syncytial virus; Bordetella pertussis; Mycoplasma pneumoniae; and  Chlamydophila pneumoniae.      Culture - Blood (18 @ 11:37)    Specimen Source: .Blood Blood-Peripheral    Culture Results:   No growth to date.

## 2018-09-17 NOTE — PROGRESS NOTE ADULT - SUBJECTIVE AND OBJECTIVE BOX
Patient is a 57y old  Female who presents with a chief complaint of unresponsiveness (14 Sep 2018 17:41)  patient seen and examined     s doing ok can walk with help   MEDICATIONS  (STANDING):  artificial  tears Solution 1 Drop(s) Both EYES two times a day  aspirin  chewable 81 milliGRAM(s) Oral daily  cefepime   IVPB      cefepime   IVPB 2000 milliGRAM(s) IV Intermittent every 8 hours  enoxaparin Injectable 40 milliGRAM(s) SubCutaneous daily  ferrous    sulfate Liquid 300 milliGRAM(s) Oral daily  insulin glargine Injectable (LANTUS) 18 Unit(s) SubCutaneous at bedtime  insulin lispro (HumaLOG) corrective regimen sliding scale   SubCutaneous Before meals and at bedtime  insulin lispro Injectable (HumaLOG) 6 Unit(s) SubCutaneous three times a day before meals  lactulose Syrup 10 Gram(s) Oral two times a day  simvastatin 20 milliGRAM(s) Oral at bedtime    MEDICATIONS  (PRN):  acetaminophen    Suspension .. 650 milliGRAM(s) Oral every 6 hours PRN Temp greater or equal to 38C (100.4F), Mild Pain (1 - 3)  sodium biphosphate Rectal Enema 1 Enema Rectal daily PRN constipation    .    Allergies    No Known Allergies    Intolerances        REVIEW OF SYSTEMS:  CONSTITUTIONAL: No fever, weight loss, or fatigue  RESPIRATORY: No cough, wheezing, chills or hemoptysis; No shortness of breath  CARDIOVASCULAR: No chest pain, palpitations, dizziness, or leg swelling  GASTROINTESTINAL: No abdominal or epigastric pain. No nausea, vomiting, or hematemesis; No diarrhea or constipation. No melena or hematochezia.  NEUROLOGICAL: No headaches, memory loss, loss of strength, numbness, or tremors  SKIN: No itching, burning, rashes, or lesions     .Vital Signs Last 24 Hrs  T(C): 36.9 (17 Sep 2018 04:55), Max: 37.2 (16 Sep 2018 14:00)  T(F): 98.4 (17 Sep 2018 04:55), Max: 98.9 (16 Sep 2018 14:00)  HR: 78 (17 Sep 2018 04:55) (78 - 99)  BP: 134/62 (17 Sep 2018 04:55) (132/79 - 134/88)  BP(mean): --  RR: 16 (17 Sep 2018 04:55) (16 - 17)  SpO2: 99% (17 Sep 2018 04:55) (96% - 99%)    PHYSICAL EXAM:  GENERAL: NAD, well-groomed, well-developed  HEAD:  Atraumatic, Normocephalic  EYES: EOMI, PERRLA, conjunctiva and sclera clear  NECK: Supple, No JVD, Normal thyroid  CHEST/LUNG: Clear to percussion bilaterally; No rales, rhonchi, wheezing, or rubs  HEART: Regular rate and rhythm; No murmurs, rubs, or gallops  ABDOMEN: Soft, Nontender, Nondistended; Bowel sounds present  NERVOUS SYSTEM:  Alert & Oriented X3, Good concentration; Motor Strength 5/5 B/L   EXTREMITIES:  2+ Peripheral Pulses, No clubbing, cyanosis, swelling on left hand and forearm.  SKIN;  LABS:                        12.5   5.9   )-----------( 387      ( 17 Sep 2018 08:14 )             38.6     09-17    141  |  107  |  7   ----------------------------<  176<H>  3.6   |  25  |  0.69    Ca    9.2      17 Sep 2018 08:15                              LABS:                        11.3   7.0   )-----------( 284      ( 15 Sep 2018 07:46 )             34.6     09-15    137  |  105  |  9   ----------------------------<  221<H>  3.6   |  25  |  0.68    Ca    8.7      15 Sep 2018 07:46  Phos  3.3     09-15  Mg     1.6     09-15    TPro  6.6  /  Alb  2.4<L>  /  TBili  0.4  /  DBili  x   /  AST  12  /  ALT  18  /  AlkPhos  57  09-15    .CAPILLARY BLOOD GLUCOSE      POCT Blood Glucose.: 239 mg/dL (16 Sep 2018 16:20)  POCT Blood Glucose.: 201 mg/dL (16 Sep 2018 12:18)  POCT Blood Glucose.: 232 mg/dL (16 Sep 2018 10:52)  POCT Blood Glucose.: 182 mg/dL (16 Sep 2018 07:39)  POCT Blood Glucose.: 175 mg/dL (15 Sep 2018 21:13)  POCT Blood Glucose.: 219 mg/dL (15 Sep 2018 16:39)

## 2018-09-17 NOTE — CHART NOTE - NSCHARTNOTEFT_GEN_A_CORE
Reassessment: S/p tube feeding   57yFemalePatient is a 57y old  Female who presents with a chief complaint of unresponsiveness (17 Sep 2018 11:08)      Factors impacting intake: [ ] none [ ] nausea  [ ] vomiting [ ] diarrhea [ ] constipation  [ ]chewing problems [ ] swallowing issues  [ X] other:     Diet Presciption: Diet, Dysphagia 2 Mechanical Soft-Thin Liquids:   Consistent Carbohydrate {Evening Snacks}  DASH/TLC {Sodium & Cholesterol Restricted} (18 @ 08:57)    Intake: Visited pt. with  at bedside, per , pt. eating fairly, intake 40% of meals & tolerating, had 1 BM in am, encourage po intake, s/p SLP on  & tube feeding, rec. Add Glucerna Shake 1can bid as medically feasible (440kcal, 20g protein) to diet ordered, awaiting transfer to [facility] & followed by CM. RD available.    Daily Weight in k.9 (14 Sep 2018 13:30)  Weight in k (14 Sep 2018 07:00)  Weight in k.9 (12 Sep 2018 07:00)  Weight in k.3 (11 Sep 2018 09:30)    % Weight Change: 7%    Pertinent Medications: MEDICATIONS  (STANDING):  artificial  tears Solution 1 Drop(s) Both EYES two times a day  aspirin  chewable 81 milliGRAM(s) Oral daily  cefepime   IVPB      cefepime   IVPB 2000 milliGRAM(s) IV Intermittent every 8 hours  enoxaparin Injectable 40 milliGRAM(s) SubCutaneous daily  ferrous    sulfate Liquid 300 milliGRAM(s) Oral daily  insulin glargine Injectable (LANTUS) 18 Unit(s) SubCutaneous at bedtime  insulin lispro (HumaLOG) corrective regimen sliding scale   SubCutaneous Before meals and at bedtime  insulin lispro Injectable (HumaLOG) 6 Unit(s) SubCutaneous three times a day before meals  lactulose Syrup 10 Gram(s) Oral two times a day  simvastatin 20 milliGRAM(s) Oral at bedtime    MEDICATIONS  (PRN):  acetaminophen    Suspension .. 650 milliGRAM(s) Oral every 6 hours PRN Temp greater or equal to 38C (100.4F), Mild Pain (1 - 3)  sodium biphosphate Rectal Enema 1 Enema Rectal daily PRN constipation    Pertinent Labs:  Na141 mmol/L Glu 176 mg/dL<H> K+ 3.6 mmol/L Cr  0.69 mg/dL BUN 7 mg/dL 09-15 Phos 3.3 mg/dL 09-15 Alb 2.4 g/dL<L>  [id number] 7.6 %<H>     CAPILLARY BLOOD GLUCOSE      POCT Blood Glucose.: 164 mg/dL (17 Sep 2018 11:29)  POCT Blood Glucose.: 163 mg/dL (17 Sep 2018 07:44)  POCT Blood Glucose.: 200 mg/dL (16 Sep 2018 21:04)  POCT Blood Glucose.: 239 mg/dL (16 Sep 2018 16:20)    Skin: intact    Estimated Needs:   [X ] no change since previous assessment  [ ] recalculated:     Previous Nutrition Diagnosis:   [X ] Inadequate Energy Intake [ ]Inadequate Oral Intake [ ] Excessive Energy Intake   [ ] Underweight [ ] Increased Nutrient Needs [ ] Overweight/Obesity   [ ] Altered GI Function [ ] Unintended Weight Loss [ ] Food & Nutrition Related Knowledge Deficit [ ] Malnutrition     Nutrition Diagnosis is [X ] ongoing  [ ] resolved [ ] not applicable     New Nutrition Diagnosis: [ ] not applicable     Interventions: To meet nutrition needs     Recommend  [ ] Change Diet To:  [X ] Nutrition Supplement  [ ] Nutrition Support  [ ] Other:     Monitoring and Evaluation:   [X] PO intake [ x ] Tolerance to diet prescription [ x ] weights [ x ] labs[ x ] follow up per protocol  [ ] other:

## 2018-09-18 LAB
ANION GAP SERPL CALC-SCNC: 10 MMOL/L — SIGNIFICANT CHANGE UP (ref 5–17)
BASOPHILS # BLD AUTO: 0 K/UL — SIGNIFICANT CHANGE UP (ref 0–0.2)
BASOPHILS NFR BLD AUTO: 0.9 % — SIGNIFICANT CHANGE UP (ref 0–2)
BUN SERPL-MCNC: 9 MG/DL — SIGNIFICANT CHANGE UP (ref 7–18)
CALCIUM SERPL-MCNC: 9.2 MG/DL — SIGNIFICANT CHANGE UP (ref 8.4–10.5)
CHLORIDE SERPL-SCNC: 107 MMOL/L — SIGNIFICANT CHANGE UP (ref 96–108)
CO2 SERPL-SCNC: 24 MMOL/L — SIGNIFICANT CHANGE UP (ref 22–31)
CREAT SERPL-MCNC: 0.67 MG/DL — SIGNIFICANT CHANGE UP (ref 0.5–1.3)
EOSINOPHIL # BLD AUTO: 0.4 K/UL — SIGNIFICANT CHANGE UP (ref 0–0.5)
EOSINOPHIL NFR BLD AUTO: 7.1 % — HIGH (ref 0–6)
GLUCOSE BLDC GLUCOMTR-MCNC: 111 MG/DL — HIGH (ref 70–99)
GLUCOSE BLDC GLUCOMTR-MCNC: 160 MG/DL — HIGH (ref 70–99)
GLUCOSE BLDC GLUCOMTR-MCNC: 227 MG/DL — HIGH (ref 70–99)
GLUCOSE BLDC GLUCOMTR-MCNC: 382 MG/DL — HIGH (ref 70–99)
GLUCOSE SERPL-MCNC: 167 MG/DL — HIGH (ref 70–99)
HCT VFR BLD CALC: 41 % — SIGNIFICANT CHANGE UP (ref 34.5–45)
HGB BLD-MCNC: 12.9 G/DL — SIGNIFICANT CHANGE UP (ref 11.5–15.5)
LYMPHOCYTES # BLD AUTO: 1.7 K/UL — SIGNIFICANT CHANGE UP (ref 1–3.3)
LYMPHOCYTES # BLD AUTO: 33.4 % — SIGNIFICANT CHANGE UP (ref 13–44)
MCHC RBC-ENTMCNC: 27.9 PG — SIGNIFICANT CHANGE UP (ref 27–34)
MCHC RBC-ENTMCNC: 31.5 GM/DL — LOW (ref 32–36)
MCV RBC AUTO: 88.3 FL — SIGNIFICANT CHANGE UP (ref 80–100)
MONOCYTES # BLD AUTO: 0.4 K/UL — SIGNIFICANT CHANGE UP (ref 0–0.9)
MONOCYTES NFR BLD AUTO: 7.6 % — SIGNIFICANT CHANGE UP (ref 2–14)
NEUTROPHILS # BLD AUTO: 2.7 K/UL — SIGNIFICANT CHANGE UP (ref 1.8–7.4)
NEUTROPHILS NFR BLD AUTO: 51 % — SIGNIFICANT CHANGE UP (ref 43–77)
PLATELET # BLD AUTO: 424 K/UL — HIGH (ref 150–400)
POTASSIUM SERPL-MCNC: 3.7 MMOL/L — SIGNIFICANT CHANGE UP (ref 3.5–5.3)
POTASSIUM SERPL-SCNC: 3.7 MMOL/L — SIGNIFICANT CHANGE UP (ref 3.5–5.3)
RBC # BLD: 4.64 M/UL — SIGNIFICANT CHANGE UP (ref 3.8–5.2)
RBC # FLD: 13 % — SIGNIFICANT CHANGE UP (ref 10.3–14.5)
SODIUM SERPL-SCNC: 141 MMOL/L — SIGNIFICANT CHANGE UP (ref 135–145)
WBC # BLD: 5.2 K/UL — SIGNIFICANT CHANGE UP (ref 3.8–10.5)
WBC # FLD AUTO: 5.2 K/UL — SIGNIFICANT CHANGE UP (ref 3.8–10.5)

## 2018-09-18 RX ADMIN — Medication 1 DROP(S): at 17:03

## 2018-09-18 RX ADMIN — Medication 6 UNIT(S): at 11:56

## 2018-09-18 RX ADMIN — Medication 4: at 11:56

## 2018-09-18 RX ADMIN — Medication 1 DROP(S): at 06:05

## 2018-09-18 RX ADMIN — SIMVASTATIN 20 MILLIGRAM(S): 20 TABLET, FILM COATED ORAL at 23:07

## 2018-09-18 RX ADMIN — CEFEPIME 100 MILLIGRAM(S): 1 INJECTION, POWDER, FOR SOLUTION INTRAMUSCULAR; INTRAVENOUS at 06:05

## 2018-09-18 RX ADMIN — LACTULOSE 10 GRAM(S): 10 SOLUTION ORAL at 06:04

## 2018-09-18 RX ADMIN — Medication 6 UNIT(S): at 17:03

## 2018-09-18 RX ADMIN — ENOXAPARIN SODIUM 40 MILLIGRAM(S): 100 INJECTION SUBCUTANEOUS at 11:23

## 2018-09-18 RX ADMIN — Medication 6 UNIT(S): at 08:09

## 2018-09-18 RX ADMIN — Medication 81 MILLIGRAM(S): at 11:23

## 2018-09-18 RX ADMIN — CEFEPIME 100 MILLIGRAM(S): 1 INJECTION, POWDER, FOR SOLUTION INTRAMUSCULAR; INTRAVENOUS at 13:30

## 2018-09-18 RX ADMIN — LACTULOSE 10 GRAM(S): 10 SOLUTION ORAL at 17:04

## 2018-09-18 RX ADMIN — Medication 10: at 17:03

## 2018-09-18 RX ADMIN — INSULIN GLARGINE 18 UNIT(S): 100 INJECTION, SOLUTION SUBCUTANEOUS at 23:06

## 2018-09-18 RX ADMIN — Medication 300 MILLIGRAM(S): at 11:23

## 2018-09-18 RX ADMIN — Medication 2: at 08:08

## 2018-09-18 RX ADMIN — CEFEPIME 100 MILLIGRAM(S): 1 INJECTION, POWDER, FOR SOLUTION INTRAMUSCULAR; INTRAVENOUS at 23:07

## 2018-09-18 NOTE — PROGRESS NOTE ADULT - ASSESSMENT
A 58 yo female who was sent in to the ER from [facility] rehab for evaluation of hypoglycemia and unresponsiveness. As per EMS, patient was thought to have low blood sugars so was treated for that without improvement in mental status. Hence, intubated by ED attending and admitted to ICU for further management. She found to have Leukocytosis, fever and B/L extensive Lower lobe infiltrate. She has started on Ceftriaxone and Doxycyline and The ID consult requested to assist with further evaluation and antibiotic management.    # Septic shock- on pressor  # B/L Lower lobe Pneumonia - MRSA/MSSA PCR is negative  - Suspected Gram Negative Pneumonia  # Herpes Labialis - s/p Valacyclovir as of 9/15/18  # Cervical spine Sx    Would recommend:    1. Discontinue Cefepime After Today's doses  2. OOb to chair/PT     -Awaiting for rehab placement    -will  follow up

## 2018-09-18 NOTE — PROGRESS NOTE ADULT - SUBJECTIVE AND OBJECTIVE BOX
Patient is a 57y old  Female who presents with a chief complaint of unresponsiveness (17 Sep 2018 18:16)      Overnight Events: None    REVIEW OF SYSTEMS:    CONSTITUTIONAL: No weakness, fevers or chills  RESPIRATORY: No cough, wheezing, hemoptysis; No shortness of breath  CARDIOVASCULAR: No chest pain or palpitations  GASTROINTESTINAL: No abdominal or epigastric pain. No nausea, vomiting, or hematemesis; No diarrhea or constipation. No melena or hematochezia.  NEUROLOGICAL: No numbness or weakness  All other review of systems is negative unless indicated above.    MEDICATIONS  (STANDING):  artificial  tears Solution 1 Drop(s) Both EYES two times a day  aspirin  chewable 81 milliGRAM(s) Oral daily  cefepime   IVPB      cefepime   IVPB 2000 milliGRAM(s) IV Intermittent every 8 hours  enoxaparin Injectable 40 milliGRAM(s) SubCutaneous daily  ferrous    sulfate Liquid 300 milliGRAM(s) Oral daily  insulin glargine Injectable (LANTUS) 18 Unit(s) SubCutaneous at bedtime  insulin lispro (HumaLOG) corrective regimen sliding scale   SubCutaneous Before meals and at bedtime  insulin lispro Injectable (HumaLOG) 6 Unit(s) SubCutaneous three times a day before meals  lactulose Syrup 10 Gram(s) Oral two times a day  simvastatin 20 milliGRAM(s) Oral at bedtime    MEDICATIONS  (PRN):  acetaminophen    Suspension .. 650 milliGRAM(s) Oral every 6 hours PRN Temp greater or equal to 38C (100.4F), Mild Pain (1 - 3)  sodium biphosphate Rectal Enema 1 Enema Rectal daily PRN constipation    Vital Signs Last 24 Hrs  T(C): 36.2 (18 Sep 2018 05:04), Max: 36.8 (17 Sep 2018 20:56)  T(F): 97.1 (18 Sep 2018 05:04), Max: 98.3 (17 Sep 2018 20:56)  HR: 75 (18 Sep 2018 05:04) (75 - 103)  BP: 138/62 (18 Sep 2018 05:04) (138/62 - 148/89)  BP(mean): --  RR: 17 (18 Sep 2018 05:04) (16 - 18)  SpO2: 98% (18 Sep 2018 05:04) (98% - 99%)    General: WN/WD NAD  Neurology: A&Ox3  Respiratory: CTA B/L, no wheezes, no rhonchi, no rales  CV: RRR, S1S2, no murmur  Abdominal: Soft, NT, ND no palpable mass, bowel sounds positive  MSK: No edema, + peripheral pulses      Labs:                        12.9   5.2   )-----------( 424      ( 18 Sep 2018 08:12 )             41.0     09-18    141  |  107  |  9   ----------------------------<  167<H>  3.7   |  24  |  0.67    Ca    9.2      18 Sep 2018 08:12

## 2018-09-18 NOTE — PROGRESS NOTE ADULT - SUBJECTIVE AND OBJECTIVE BOX
Patient is seen and examined at the bed side, is afebrile. She is doing much better, no new complaints.        REVIEW OF SYSTEMS: Unable to obtain since intubated and not responsive      ALLERGIES: NKDA      Vital Signs Last 24 Hrs  T(C): 37.1 (18 Sep 2018 14:12), Max: 37.1 (18 Sep 2018 14:12)  T(F): 98.7 (18 Sep 2018 14:12), Max: 98.7 (18 Sep 2018 14:12)  HR: 102 (18 Sep 2018 14:12) (75 - 103)  BP: 137/94 (18 Sep 2018 14:12) (137/94 - 145/83)  BP(mean): --  RR: 18 (18 Sep 2018 14:12) (16 - 18)  SpO2: 96% (18 Sep 2018 14:12) (96% - 99%)        PHYSICAL EXAM:  GENERAL: Not in distress  HEENT: Lip lesions crusting  CVS: s1 and s2 present  RESP: Air entry B/L  GI: Abdomen non distended and Nontender  EXT: No pedal edema   CNS: Awake and Alert        LABS:                        12.9   5.2   )-----------( 424      ( 18 Sep 2018 08:12 )             41.0                   12.5   5.9   )-----------( 387      ( 17 Sep 2018 08:14 )             38.6                    11.6   15.0  )-----------( 227      ( 10 Sep 2018 06:13 )             35.6         -18    141  |  107  |  9   ----------------------------<  167<H>  3.7   |  24  |  0.67    Ca    9.2      18 Sep 2018 08:12      09-17    141  |  107  |  7   ----------------------------<  176<H>  3.6   |  25  |  0.69    Ca    9.2      17 Sep 2018 08:15      TPro  6.6  /  Alb  2.4<L>  /  TBili  0.4  /  DBili  x   /  AST  12  /  ALT  18  /  AlkPhos  57  -15      Color: Yellow / Appearance: Clear / S.010 / pH: x  Gluc: x / Ketone: Trace  / Bili: Negative / Urobili: Negative   Blood: x / Protein: 100 / Nitrite: Negative   Leuk Esterase: Trace / RBC: 0-2 /HPF / WBC 0-2 /HPF   Sq Epi: x / Non Sq Epi: Few /HPF / Bacteria: Negative /HPF      CAPILLARY BLOOD GLUCOSE      POCT Blood Glucose.: 166 mg/dL (09 Sep 2018 17:59)  POCT Blood Glucose.: 257 mg/dL (09 Sep 2018 13:45)  POCT Blood Glucose.: 301 mg/dL (09 Sep 2018 12:04)  POCT Blood Glucose.: 289 mg/dL (09 Sep 2018 06:24)  POCT Blood Glucose.: 198 mg/dL (08 Sep 2018 23:46)  POCT Blood Glucose.: 168 mg/dL (08 Sep 2018 22:29)  POCT Blood Glucose.: 163 mg/dL (08 Sep 2018 20:12)  POCT Blood Glucose.: 142 mg/dL (08 Sep 2018 18:59)    ABG - ( 09 Sep 2018 04:43 )  pH, Arterial: 7.29  pH, Blood: x     /  pCO2: 40    /  pO2: 108   / HCO3: 19    / Base Excess: -6.7  /  SaO2: 98            MEDICATIONS  (STANDING):  artificial  tears Solution 1 Drop(s) Both EYES two times a day  aspirin  chewable 81 milliGRAM(s) Oral daily  cefepime   IVPB      cefepime   IVPB 2000 milliGRAM(s) IV Intermittent every 8 hours  enoxaparin Injectable 40 milliGRAM(s) SubCutaneous daily  ferrous    sulfate Liquid 300 milliGRAM(s) Oral daily  insulin glargine Injectable (LANTUS) 18 Unit(s) SubCutaneous at bedtime  insulin lispro (HumaLOG) corrective regimen sliding scale   SubCutaneous Before meals and at bedtime  insulin lispro Injectable (HumaLOG) 6 Unit(s) SubCutaneous three times a day before meals  lactulose Syrup 10 Gram(s) Oral two times a day  simvastatin 20 milliGRAM(s) Oral at bedtime    MEDICATIONS  (PRN):  acetaminophen    Suspension .. 650 milliGRAM(s) Oral every 6 hours PRN Temp greater or equal to 38C (100.4F), Mild Pain (1 - 3)  sodium biphosphate Rectal Enema 1 Enema Rectal daily PRN constipation          RADIOLOGY & ADDITIONAL TESTS:    9/10/18: Xray Chest 1 View- PORTABLE-Routine (09.10.18 @ 06:59) ET and enteric tubes, and right IJ catheter remain in place. No pneumothorax. Small layering left pleural effusion has increased. Small right pleural effusion is unchanged. Mild pulmonary vascular congestion and scattered  lung opacities are similar to the prior study.    18 : Xray Chest 1 View- PORTABLE-Routine (18 @ 10:01) Mild pulmonary venous congestion, increased since the prior study. No pleural effusions . Hazy opacities in the lower lobes could   represent atelectasis or infiltrates.      18 : CT Abdomen and Pelvis No Cont (18 @ 03:24) No retroperitoneal hemorrhage. No bowel obstruction. Extensive bilateral lower lobe consolidation. Differential diagnosis   includes multifocal pneumonia, aspiration pneumonia, alveolar hemorrhage. Mild pulmonary venous congestion.      18 : CT Head No Cont (18 @ 07:55) There is no evidence of hydrocephalus, mass effect or any sizable extra-axial collection. Due to motion artifact, subtle abnormalities       MICROBIOLOGY DATA:    MRSA/MSSA PCR (09.10.18 @ 10:01)    MRSA PCR Result.: NotDetec: MRSA/MSSA PCR assay is a qualitative in vitro diagnostic test for the  direct detection and differentiation of methicillin-resistant  Staphylococcus aureus (MRSA) from Staphylococcus aureus (SA). The assay  detects DNA from nasal swabs in patients atrisk for nasal colonization.  It is not intended to diagnose MRSA or SA infections nor guide or monitor  treatment for MRSA/SA infections. A negative result does not preclude  nasal colonization. The assay is FDA-approved and its performance has  been established by [employer], [place] and the [place], [place].    Staph Aureus PCR Result: Detected    Culture - Sputum . (09.10.18 @ 09:54)    Gram Stain:   No polymorphonuclear leukocytes per low power field  No Squamous epithelial cells per low power field  No organisms seen per oil power field    Specimen Source: .Sputum Sputum, trap    Culture Results:   No growth to date.    Rapid Respiratory Viral Panel (18 @ 18:24)    Rapid RVP Result: NotDetec: The FilmArray RVP Rapid uses polymerase chain reaction (PCR) and melt  curve analysis to screen for adenovirus; coronavirus HKU1, NL63, 229E,  OC43; human metapneumovirus (hMPV); human enterovirus/rhinovirus  (Entero/RV); influenza A; influenza A/H1;influenza A/H3; influenza  A/H1-2009; influenza B; parainfluenza viruses 1, 2, 3, 4; respiratory  syncytial virus; Bordetella pertussis; Mycoplasma pneumoniae; and  Chlamydophila pneumoniae.      Culture - Blood (18 @ 11:37)    Specimen Source: .Blood Blood-Peripheral    Culture Results:   No growth to date.

## 2018-09-18 NOTE — PROGRESS NOTE ADULT - SUBJECTIVE AND OBJECTIVE BOX
Interval Events:  pt in nad    Allergies    No Known Allergies    Intolerances      Endocrine/Metabolic Medications:  insulin glargine Injectable (LANTUS) 18 Unit(s) SubCutaneous at bedtime  insulin lispro (HumaLOG) corrective regimen sliding scale   SubCutaneous Before meals and at bedtime  insulin lispro Injectable (HumaLOG) 6 Unit(s) SubCutaneous three times a day before meals  simvastatin 20 milliGRAM(s) Oral at bedtime      Vital Signs Last 24 Hrs  T(C): 36.2 (18 Sep 2018 05:04), Max: 36.8 (17 Sep 2018 20:56)  T(F): 97.1 (18 Sep 2018 05:04), Max: 98.3 (17 Sep 2018 20:56)  HR: 75 (18 Sep 2018 05:04) (75 - 103)  BP: 138/62 (18 Sep 2018 05:04) (138/62 - 148/89)  BP(mean): --  RR: 17 (18 Sep 2018 05:04) (16 - 18)  SpO2: 98% (18 Sep 2018 05:04) (98% - 99%)      PHYSICAL EXAM  All physical exam findings normal, except those marked:  General:	Alert, active, cooperative, NAD, well hydrated  .		[] Abnormal:  Neck		Normal: supple, no cervical adenopathy, no palpable thyroid  .		[] Abnormal:  Cardiovascular	Normal: regular rate, normal S1, S2, no murmurs  .		[] Abnormal:  Respiratory	Normal: no chest wall deformity, normal respiratory pattern, CTA B/L  .		[] Abnormal:  Abdominal	Normal: soft, ND, NT, bowel sounds present, no masses, no organomegaly  .		[] Abnormal:  		Normal normal genitalia, testes descended, circumcised/uncircumcised  .		Addison stage:			Breast addison:  .		Menstrual history:  .		[] Abnormal:  Extremities	Normal: FROM x4  .		[] Abnormal:  Skin		Normal: intact and not indurated, no rash, no acanthosis nigricans  .		[] Abnormal:  Neurologic	Normal: grossly intact  .		[] Abnormal:    LABS                        12.9   5.2   )-----------( 424      ( 18 Sep 2018 08:12 )             41.0                               141    |  107    |  9                   Calcium: 9.2   / iCa: x      (09-18 @ 08:12)    ----------------------------<  167       Magnesium: x                                3.7     |  24     |  0.67             Phosphorous: x          CAPILLARY BLOOD GLUCOSE      POCT Blood Glucose.: 160 mg/dL (18 Sep 2018 07:41)  POCT Blood Glucose.: 246 mg/dL (17 Sep 2018 21:14)  POCT Blood Glucose.: 159 mg/dL (17 Sep 2018 16:49)  POCT Blood Glucose.: 164 mg/dL (17 Sep 2018 11:29)        Assesment/plan    Dm- overall good control  s/p hypoglycemia  cont current insulin tx (lantus 18U QHS and Humalog 6U TID)  fsg ac and hs  d/w hs  d/c planning per prim team

## 2018-09-19 LAB
GLUCOSE BLDC GLUCOMTR-MCNC: 165 MG/DL — HIGH (ref 70–99)
GLUCOSE BLDC GLUCOMTR-MCNC: 281 MG/DL — HIGH (ref 70–99)
GLUCOSE BLDC GLUCOMTR-MCNC: 337 MG/DL — HIGH (ref 70–99)
GLUCOSE BLDC GLUCOMTR-MCNC: 90 MG/DL — SIGNIFICANT CHANGE UP (ref 70–99)

## 2018-09-19 RX ORDER — INSULIN LISPRO 100/ML
6 VIAL (ML) SUBCUTANEOUS
Qty: 0 | Refills: 0 | COMMUNITY

## 2018-09-19 RX ORDER — INSULIN LISPRO 100/ML
8 VIAL (ML) SUBCUTANEOUS
Qty: 1 | Refills: 0 | OUTPATIENT
Start: 2018-09-19

## 2018-09-19 RX ORDER — INSULIN LISPRO 100/ML
8 VIAL (ML) SUBCUTANEOUS
Qty: 0 | Refills: 0 | Status: DISCONTINUED | OUTPATIENT
Start: 2018-09-19 | End: 2018-09-20

## 2018-09-19 RX ADMIN — Medication 650 MILLIGRAM(S): at 19:13

## 2018-09-19 RX ADMIN — ENOXAPARIN SODIUM 40 MILLIGRAM(S): 100 INJECTION SUBCUTANEOUS at 11:25

## 2018-09-19 RX ADMIN — Medication 300 MILLIGRAM(S): at 11:24

## 2018-09-19 RX ADMIN — Medication 650 MILLIGRAM(S): at 18:43

## 2018-09-19 RX ADMIN — INSULIN GLARGINE 18 UNIT(S): 100 INJECTION, SOLUTION SUBCUTANEOUS at 21:35

## 2018-09-19 RX ADMIN — Medication 8 UNIT(S): at 12:00

## 2018-09-19 RX ADMIN — Medication 1 DROP(S): at 17:18

## 2018-09-19 RX ADMIN — LACTULOSE 10 GRAM(S): 10 SOLUTION ORAL at 17:18

## 2018-09-19 RX ADMIN — Medication 6: at 21:35

## 2018-09-19 RX ADMIN — Medication 6 UNIT(S): at 08:03

## 2018-09-19 RX ADMIN — CEFEPIME 100 MILLIGRAM(S): 1 INJECTION, POWDER, FOR SOLUTION INTRAMUSCULAR; INTRAVENOUS at 06:14

## 2018-09-19 RX ADMIN — Medication 81 MILLIGRAM(S): at 11:24

## 2018-09-19 RX ADMIN — LACTULOSE 10 GRAM(S): 10 SOLUTION ORAL at 06:14

## 2018-09-19 RX ADMIN — Medication 2: at 08:02

## 2018-09-19 RX ADMIN — Medication 1 DROP(S): at 06:14

## 2018-09-19 RX ADMIN — SIMVASTATIN 20 MILLIGRAM(S): 20 TABLET, FILM COATED ORAL at 21:35

## 2018-09-19 RX ADMIN — Medication 8: at 12:00

## 2018-09-19 NOTE — PROGRESS NOTE ADULT - SUBJECTIVE AND OBJECTIVE BOX
Patient is seen and examined at the bed side, is afebrile. She is doing much better, no new complaints.        REVIEW OF SYSTEMS: Unable to obtain since intubated and not responsive      ALLERGIES: NKDA      Vital Signs Last 24 Hrs  T(C): 36.7 (19 Sep 2018 14:16), Max: 37.4 (18 Sep 2018 21:58)  T(F): 98.1 (19 Sep 2018 14:16), Max: 99.3 (18 Sep 2018 21:58)  HR: 104 (19 Sep 2018 14:16) (76 - 104)  BP: 142/87 (19 Sep 2018 14:16) (114/65 - 142/87)  BP(mean): --  RR: 18 (19 Sep 2018 14:16) (16 - 18)  SpO2: 97% (19 Sep 2018 14:16) (93% - 100%)        PHYSICAL EXAM:  GENERAL: Not in distress  HEENT: Lip lesions crusting  CVS: s1 and s2 present  RESP: Air entry B/L  GI: Abdomen non distended and Nontender  EXT: No pedal edema   CNS: Awake and Alert        LABS:                        12.9   5.2   )-----------( 424      ( 18 Sep 2018 08:12 )             41.0                               12.5   5.9   )-----------( 387      ( 17 Sep 2018 08:14 )             38.6                    11.6   15.0  )-----------( 227      ( 10 Sep 2018 06:13 )             35.6         -18    141  |  107  |  9   ----------------------------<  167<H>  3.7   |  24  |  0.67    Ca    9.2      18 Sep 2018 08:12      09-17    141  |  107  |  7   ----------------------------<  176<H>  3.6   |  25  |  0.69    Ca    9.2      17 Sep 2018 08:15      TPro  6.6  /  Alb  2.4<L>  /  TBili  0.4  /  DBili  x   /  AST  12  /  ALT  18  /  AlkPhos  57  09-15      Color: Yellow / Appearance: Clear / S.010 / pH: x  Gluc: x / Ketone: Trace  / Bili: Negative / Urobili: Negative   Blood: x / Protein: 100 / Nitrite: Negative   Leuk Esterase: Trace / RBC: 0-2 /HPF / WBC 0-2 /HPF   Sq Epi: x / Non Sq Epi: Few /HPF / Bacteria: Negative /HPF      CAPILLARY BLOOD GLUCOSE      POCT Blood Glucose.: 166 mg/dL (09 Sep 2018 17:59)  POCT Blood Glucose.: 257 mg/dL (09 Sep 2018 13:45)  POCT Blood Glucose.: 301 mg/dL (09 Sep 2018 12:04)  POCT Blood Glucose.: 289 mg/dL (09 Sep 2018 06:24)  POCT Blood Glucose.: 198 mg/dL (08 Sep 2018 23:46)  POCT Blood Glucose.: 168 mg/dL (08 Sep 2018 22:29)  POCT Blood Glucose.: 163 mg/dL (08 Sep 2018 20:12)  POCT Blood Glucose.: 142 mg/dL (08 Sep 2018 18:59)    ABG - ( 09 Sep 2018 04:43 )  pH, Arterial: 7.29  pH, Blood: x     /  pCO2: 40    /  pO2: 108   / HCO3: 19    / Base Excess: -6.7  /  SaO2: 98          MEDICATIONS  (STANDING):  artificial  tears Solution 1 Drop(s) Both EYES two times a day  aspirin  chewable 81 milliGRAM(s) Oral daily  enoxaparin Injectable 40 milliGRAM(s) SubCutaneous daily  ferrous    sulfate Liquid 300 milliGRAM(s) Oral daily  insulin glargine Injectable (LANTUS) 18 Unit(s) SubCutaneous at bedtime  insulin lispro (HumaLOG) corrective regimen sliding scale   SubCutaneous Before meals and at bedtime  insulin lispro Injectable (HumaLOG) 8 Unit(s) SubCutaneous three times a day before meals  lactulose Syrup 10 Gram(s) Oral two times a day  simvastatin 20 milliGRAM(s) Oral at bedtime    MEDICATIONS  (PRN):  acetaminophen    Suspension .. 650 milliGRAM(s) Oral every 6 hours PRN Temp greater or equal to 38C (100.4F), Mild Pain (1 - 3)  sodium biphosphate Rectal Enema 1 Enema Rectal daily PRN constipation          RADIOLOGY & ADDITIONAL TESTS:    9/10/18: Xray Chest 1 View- PORTABLE-Routine (09.10.18 @ 06:59) ET and enteric tubes, and right IJ catheter remain in place. No pneumothorax. Small layering left pleural effusion has increased. Small right pleural effusion is unchanged. Mild pulmonary vascular congestion and scattered  lung opacities are similar to the prior study.    18 : Xray Chest 1 View- PORTABLE-Routine (18 @ 10:01) Mild pulmonary venous congestion, increased since the prior study. No pleural effusions . Hazy opacities in the lower lobes could   represent atelectasis or infiltrates.      18 : CT Abdomen and Pelvis No Cont (18 @ 03:24) No retroperitoneal hemorrhage. No bowel obstruction. Extensive bilateral lower lobe consolidation. Differential diagnosis   includes multifocal pneumonia, aspiration pneumonia, alveolar hemorrhage. Mild pulmonary venous congestion.      18 : CT Head No Cont (18 @ 07:55) There is no evidence of hydrocephalus, mass effect or any sizable extra-axial collection. Due to motion artifact, subtle abnormalities       MICROBIOLOGY DATA:    MRSA/MSSA PCR (09.10.18 @ 10:01)    MRSA PCR Result.: NotDetec: MRSA/MSSA PCR assay is a qualitative in vitro diagnostic test for the  direct detection and differentiation of methicillin-resistant  Staphylococcus aureus (MRSA) from Staphylococcus aureus (SA). The assay  detects DNA from nasal swabs in patients atrisk for nasal colonization.  It is not intended to diagnose MRSA or SA infections nor guide or monitor  treatment for MRSA/SA infections. A negative result does not preclude  nasal colonization. The assay is FDA-approved and its performance has  been established by [employer], [place] and the [facility], [place], [place].    Staph Aureus PCR Result: Detected    Culture - Sputum . (09.10.18 @ 09:54)    Gram Stain:   No polymorphonuclear leukocytes per low power field  No Squamous epithelial cells per low power field  No organisms seen per oil power field    Specimen Source: .Sputum Sputum, trap    Culture Results:   No growth to date.    Rapid Respiratory Viral Panel (18 @ 18:24)    Rapid RVP Result: NotDetec: The FilmArray RVP Rapid uses polymerase chain reaction (PCR) and melt  curve analysis to screen for adenovirus; coronavirus HKU1, NL63, 229E,  OC43; human metapneumovirus (hMPV); human enterovirus/rhinovirus  (Entero/RV); influenza A; influenza A/H1;influenza A/H3; influenza  A/H1-2009; influenza B; parainfluenza viruses 1, 2, 3, 4; respiratory  syncytial virus; Bordetella pertussis; Mycoplasma pneumoniae; and  Chlamydophila pneumoniae.      Culture - Blood (18 @ 11:37)    Specimen Source: .Blood Blood-Peripheral    Culture Results:   No growth to date. Patient is seen and examined at the bed side, is afebrile. She has no new complaints.        REVIEW OF SYSTEMS: Unable to obtain since intubated and not responsive      ALLERGIES: NKDA      Vital Signs Last 24 Hrs  T(C): 36.7 (19 Sep 2018 14:16), Max: 37.4 (18 Sep 2018 21:58)  T(F): 98.1 (19 Sep 2018 14:16), Max: 99.3 (18 Sep 2018 21:58)  HR: 104 (19 Sep 2018 14:16) (76 - 104)  BP: 142/87 (19 Sep 2018 14:16) (114/65 - 142/87)  BP(mean): --  RR: 18 (19 Sep 2018 14:16) (16 - 18)  SpO2: 97% (19 Sep 2018 14:16) (93% - 100%)        PHYSICAL EXAM:  GENERAL: Not in distress  HEENT: Lip lesions crusting  CVS: s1 and s2 present  RESP: Air entry B/L  GI: Abdomen non distended and Nontender  EXT: No pedal edema   CNS: Awake and Alert        LABS: No new Labs                          12.9   5.2   )-----------( 424      ( 18 Sep 2018 08:12 )             41.0                               12.5   5.9   )-----------( 387      ( 17 Sep 2018 08:14 )             38.6                    11.6   15.0  )-----------( 227      ( 10 Sep 2018 06:13 )             35.6         09-18    141  |  107  |  9   ----------------------------<  167<H>  3.7   |  24  |  0.67    Ca    9.2      18 Sep 2018 08:12      09-17    141  |  107  |  7   ----------------------------<  176<H>  3.6   |  25  |  0.69    Ca    9.2      17 Sep 2018 08:15      TPro  6.6  /  Alb  2.4<L>  /  TBili  0.4  /  DBili  x   /  AST  12  /  ALT  18  /  AlkPhos  57  09-15      Color: Yellow / Appearance: Clear / S.010 / pH: x  Gluc: x / Ketone: Trace  / Bili: Negative / Urobili: Negative   Blood: x / Protein: 100 / Nitrite: Negative   Leuk Esterase: Trace / RBC: 0-2 /HPF / WBC 0-2 /HPF   Sq Epi: x / Non Sq Epi: Few /HPF / Bacteria: Negative /HPF      CAPILLARY BLOOD GLUCOSE      POCT Blood Glucose.: 166 mg/dL (09 Sep 2018 17:59)  POCT Blood Glucose.: 257 mg/dL (09 Sep 2018 13:45)  POCT Blood Glucose.: 301 mg/dL (09 Sep 2018 12:04)  POCT Blood Glucose.: 289 mg/dL (09 Sep 2018 06:24)  POCT Blood Glucose.: 198 mg/dL (08 Sep 2018 23:46)  POCT Blood Glucose.: 168 mg/dL (08 Sep 2018 22:29)  POCT Blood Glucose.: 163 mg/dL (08 Sep 2018 20:12)  POCT Blood Glucose.: 142 mg/dL (08 Sep 2018 18:59)    ABG - ( 09 Sep 2018 04:43 )  pH, Arterial: 7.29  pH, Blood: x     /  pCO2: 40    /  pO2: 108   / HCO3: 19    / Base Excess: -6.7  /  SaO2: 98          MEDICATIONS  (STANDING):  artificial  tears Solution 1 Drop(s) Both EYES two times a day  aspirin  chewable 81 milliGRAM(s) Oral daily  enoxaparin Injectable 40 milliGRAM(s) SubCutaneous daily  ferrous    sulfate Liquid 300 milliGRAM(s) Oral daily  insulin glargine Injectable (LANTUS) 18 Unit(s) SubCutaneous at bedtime  insulin lispro (HumaLOG) corrective regimen sliding scale   SubCutaneous Before meals and at bedtime  insulin lispro Injectable (HumaLOG) 8 Unit(s) SubCutaneous three times a day before meals  lactulose Syrup 10 Gram(s) Oral two times a day  simvastatin 20 milliGRAM(s) Oral at bedtime    MEDICATIONS  (PRN):  acetaminophen    Suspension .. 650 milliGRAM(s) Oral every 6 hours PRN Temp greater or equal to 38C (100.4F), Mild Pain (1 - 3)  sodium biphosphate Rectal Enema 1 Enema Rectal daily PRN constipation          RADIOLOGY & ADDITIONAL TESTS:    9/10/18: Xray Chest 1 View- PORTABLE-Routine (09.10.18 @ 06:59) ET and enteric tubes, and right IJ catheter remain in place. No pneumothorax. Small layering left pleural effusion has increased. Small right pleural effusion is unchanged. Mild pulmonary vascular congestion and scattered  lung opacities are similar to the prior study.    18 : Xray Chest 1 View- PORTABLE-Routine (18 @ 10:01) Mild pulmonary venous congestion, increased since the prior study. No pleural effusions . Hazy opacities in the lower lobes could   represent atelectasis or infiltrates.      18 : CT Abdomen and Pelvis No Cont (18 @ 03:24) No retroperitoneal hemorrhage. No bowel obstruction. Extensive bilateral lower lobe consolidation. Differential diagnosis   includes multifocal pneumonia, aspiration pneumonia, alveolar hemorrhage. Mild pulmonary venous congestion.      18 : CT Head No Cont (18 @ 07:55) There is no evidence of hydrocephalus, mass effect or any sizable extra-axial collection. Due to motion artifact, subtle abnormalities       MICROBIOLOGY DATA:    MRSA/MSSA PCR (09.10.18 @ 10:01)    MRSA PCR Result.: NotDete: MRSA/MSSA PCR assay is a qualitative in vitro diagnostic test for the  direct detection and differentiation of methicillin-resistant  Staphylococcus aureus (MRSA) from Staphylococcus aureus (SA). The assay  detects DNA from nasal swabs in patients atrisk for nasal colonization.  It is not intended to diagnose MRSA or SA infections nor guide or monitor  treatment for MRSA/SA infections. A negative result does not preclude  nasal colonization. The assay is FDA-approved and its performance has  been established by [employer], [place] and the [facility], [place], [place].    Staph Aureus PCR Result: Detected    Culture - Sputum . (09.10.18 @ 09:54)    Gram Stain:   No polymorphonuclear leukocytes per low power field  No Squamous epithelial cells per low power field  No organisms seen per oil power field    Specimen Source: .Sputum Sputum, trap    Culture Results:   No growth to date.    Rapid Respiratory Viral Panel (18 @ 18:24)    Rapid RVP Result: NotDetec: The FilmArray RVP Rapid uses polymerase chain reaction (PCR) and melt  curve analysis to screen for adenovirus; coronavirus HKU1, NL63, 229E,  OC43; human metapneumovirus (hMPV); human enterovirus/rhinovirus  (Entero/RV); influenza A; influenza A/H1;influenza A/H3; influenza  A/H1-2009; influenza B; parainfluenza viruses 1, 2, 3, 4; respiratory  syncytial virus; Bordetella pertussis; Mycoplasma pneumoniae; and  Chlamydophila pneumoniae.      Culture - Blood (18 @ 11:37)    Specimen Source: .Blood Blood-Peripheral    Culture Results:   No growth to date.

## 2018-09-19 NOTE — PROGRESS NOTE ADULT - SUBJECTIVE AND OBJECTIVE BOX
Patient is a 57y old  Female who presents with a chief complaint of unresponsiveness (18 Sep 2018 18:07)      Overnight Events: None    REVIEW OF SYSTEMS:    CONSTITUTIONAL: No weakness, fevers or chills  RESPIRATORY: No cough, wheezing, hemoptysis; No shortness of breath  CARDIOVASCULAR: No chest pain or palpitations  GASTROINTESTINAL: No abdominal or epigastric pain. No nausea, vomiting, or hematemesis; No diarrhea or constipation. No melena or hematochezia.  NEUROLOGICAL: No numbness or weakness  All other review of systems is negative unless indicated above.    MEDICATIONS  (STANDING):  artificial  tears Solution 1 Drop(s) Both EYES two times a day  aspirin  chewable 81 milliGRAM(s) Oral daily  enoxaparin Injectable 40 milliGRAM(s) SubCutaneous daily  ferrous    sulfate Liquid 300 milliGRAM(s) Oral daily  insulin glargine Injectable (LANTUS) 18 Unit(s) SubCutaneous at bedtime  insulin lispro (HumaLOG) corrective regimen sliding scale   SubCutaneous Before meals and at bedtime  insulin lispro Injectable (HumaLOG) 6 Unit(s) SubCutaneous three times a day before meals  lactulose Syrup 10 Gram(s) Oral two times a day  simvastatin 20 milliGRAM(s) Oral at bedtime    MEDICATIONS  (PRN):  acetaminophen    Suspension .. 650 milliGRAM(s) Oral every 6 hours PRN Temp greater or equal to 38C (100.4F), Mild Pain (1 - 3)  sodium biphosphate Rectal Enema 1 Enema Rectal daily PRN constipation    Vital Signs Last 24 Hrs  T(C): 36.6 (19 Sep 2018 04:40), Max: 37.4 (18 Sep 2018 21:58)  T(F): 97.8 (19 Sep 2018 04:40), Max: 99.3 (18 Sep 2018 21:58)  HR: 76 (19 Sep 2018 04:40) (76 - 102)  BP: 114/65 (19 Sep 2018 04:40) (114/65 - 137/94)  BP(mean): --  RR: 16 (19 Sep 2018 04:40) (16 - 18)  SpO2: 93% (19 Sep 2018 04:40) (93% - 100%)    General: WN/WD NAD  Neurology: A&Ox3  Respiratory: CTA B/L, no wheezes, no rhonchi, no rales  CV: RRR, S1S2, no murmur  Abdominal: Soft, NT, ND no palpable mass, bowel sounds positive  MSK: No edema, + peripheral pulses      Labs:                        12.9   5.2   )-----------( 424      ( 18 Sep 2018 08:12 )             41.0     09-18    141  |  107  |  9   ----------------------------<  167<H>  3.7   |  24  |  0.67    Ca    9.2      18 Sep 2018 08:12

## 2018-09-19 NOTE — PROGRESS NOTE ADULT - SUBJECTIVE AND OBJECTIVE BOX
Interval Events:  pt in nad    Allergies    No Known Allergies    Intolerances      Endocrine/Metabolic Medications:  insulin glargine Injectable (LANTUS) 18 Unit(s) SubCutaneous at bedtime  insulin lispro (HumaLOG) corrective regimen sliding scale   SubCutaneous Before meals and at bedtime  insulin lispro Injectable (HumaLOG) 6 Unit(s) SubCutaneous three times a day before meals  simvastatin 20 milliGRAM(s) Oral at bedtime      Vital Signs Last 24 Hrs  T(C): 36.6 (19 Sep 2018 04:40), Max: 37.4 (18 Sep 2018 21:58)  T(F): 97.8 (19 Sep 2018 04:40), Max: 99.3 (18 Sep 2018 21:58)  HR: 76 (19 Sep 2018 04:40) (76 - 102)  BP: 114/65 (19 Sep 2018 04:40) (114/65 - 137/94)  BP(mean): --  RR: 16 (19 Sep 2018 04:40) (16 - 18)  SpO2: 93% (19 Sep 2018 04:40) (93% - 100%)      PHYSICAL EXAM  All physical exam findings normal, except those marked:  General:	Alert, active, cooperative, NAD, well hydrated  .		[] Abnormal:  Neck		Normal: supple, no cervical adenopathy, no palpable thyroid  .		[] Abnormal:  Cardiovascular	Normal: regular rate, normal S1, S2, no murmurs  .		[] Abnormal:  Respiratory	Normal: no chest wall deformity, normal respiratory pattern, CTA B/L  .		[] Abnormal:  Abdominal	Normal: soft, ND, NT, bowel sounds present, no masses, no organomegaly  .		[] Abnormal:  		Normal normal genitalia, testes descended, circumcised/uncircumcised  .		Addison stage:			Breast addison:  .		Menstrual history:  .		[] Abnormal:  Extremities	Normal: FROM x4  .		[] Abnormal:  Skin		Normal: intact and not indurated, no rash, no acanthosis nigricans  .		[] Abnormal:  Neurologic	Normal: grossly intact  .		[] Abnormal:    LABS        CAPILLARY BLOOD GLUCOSE      POCT Blood Glucose.: 165 mg/dL (19 Sep 2018 07:39)  POCT Blood Glucose.: 111 mg/dL (18 Sep 2018 21:56)  POCT Blood Glucose.: 382 mg/dL (18 Sep 2018 16:40)  POCT Blood Glucose.: 227 mg/dL (18 Sep 2018 11:35)        Assesment/plan   dm- s/p hypoglycemia  cont lantus 18  change humalog to 8 ac tid  fsg ac and hs

## 2018-09-19 NOTE — PROGRESS NOTE ADULT - ASSESSMENT
A 56 yo female who was sent in to the ER from [facility] rehab for evaluation of hypoglycemia and unresponsiveness. As per EMS, patient was thought to have low blood sugars so was treated for that without improvement in mental status. Hence, intubated by ED attending and admitted to ICU for further management. She found to have Leukocytosis, fever and B/L extensive Lower lobe infiltrate. She has started on Ceftriaxone and Doxycyline and The ID consult requested to assist with further evaluation and antibiotic management.    # Septic shock- on pressor  # B/L Lower lobe Pneumonia - MRSA/MSSA PCR is negative  - Suspected Gram Negative Pneumonia  # Herpes Labialis - s/p Valacyclovir as of 9/15/18  # Cervical spine Sx    Would recommend:    1. Monitor OFF antibiotics  2. OOb to chair/PT     -Awaiting for rehab placement    -will  follow up A 56 yo female who was sent in to the ER from [facility] rehab for evaluation of hypoglycemia and unresponsiveness. As per EMS, patient was thought to have low blood sugars so was treated for that without improvement in mental status. Hence, intubated by ED attending and admitted to ICU for further management. She found to have Leukocytosis, fever and B/L extensive Lower lobe infiltrate. She has started on Ceftriaxone and Doxycyline and The ID consult requested to assist with further evaluation and antibiotic management.    # Septic shock- on pressor  # B/L Lower lobe Pneumonia - MRSA/MSSA PCR is negative  - Suspected Gram Negative Pneumonia  # Herpes Labialis - s/p Valacyclovir as of 9/15/18  # Cervical spine Sx    Would recommend:    1. Monitor OFF antibiotics  2. OOb to chair/PT     -Awaiting for rehab placement

## 2018-09-20 VITALS — SYSTOLIC BLOOD PRESSURE: 125 MMHG | DIASTOLIC BLOOD PRESSURE: 75 MMHG | TEMPERATURE: 99 F | HEART RATE: 74 BPM

## 2018-09-20 LAB
GLUCOSE BLDC GLUCOMTR-MCNC: 202 MG/DL — HIGH (ref 70–99)
GLUCOSE BLDC GLUCOMTR-MCNC: 275 MG/DL — HIGH (ref 70–99)

## 2018-09-20 PROCEDURE — 85027 COMPLETE CBC AUTOMATED: CPT

## 2018-09-20 PROCEDURE — 87641 MR-STAPH DNA AMP PROBE: CPT

## 2018-09-20 PROCEDURE — 93306 TTE W/DOPPLER COMPLETE: CPT

## 2018-09-20 PROCEDURE — 82550 ASSAY OF CK (CPK): CPT

## 2018-09-20 PROCEDURE — 84681 ASSAY OF C-PEPTIDE: CPT

## 2018-09-20 PROCEDURE — 83615 LACTATE (LD) (LDH) ENZYME: CPT

## 2018-09-20 PROCEDURE — 85610 PROTHROMBIN TIME: CPT

## 2018-09-20 PROCEDURE — 86901 BLOOD TYPING SEROLOGIC RH(D): CPT

## 2018-09-20 PROCEDURE — 85045 AUTOMATED RETICULOCYTE COUNT: CPT

## 2018-09-20 PROCEDURE — 87040 BLOOD CULTURE FOR BACTERIA: CPT

## 2018-09-20 PROCEDURE — 70450 CT HEAD/BRAIN W/O DYE: CPT

## 2018-09-20 PROCEDURE — 84436 ASSAY OF TOTAL THYROXINE: CPT

## 2018-09-20 PROCEDURE — 80053 COMPREHEN METABOLIC PANEL: CPT

## 2018-09-20 PROCEDURE — 87633 RESP VIRUS 12-25 TARGETS: CPT

## 2018-09-20 PROCEDURE — 81001 URINALYSIS AUTO W/SCOPE: CPT

## 2018-09-20 PROCEDURE — 82140 ASSAY OF AMMONIA: CPT

## 2018-09-20 PROCEDURE — 84484 ASSAY OF TROPONIN QUANT: CPT

## 2018-09-20 PROCEDURE — 36430 TRANSFUSION BLD/BLD COMPNT: CPT

## 2018-09-20 PROCEDURE — 83036 HEMOGLOBIN GLYCOSYLATED A1C: CPT

## 2018-09-20 PROCEDURE — 83735 ASSAY OF MAGNESIUM: CPT

## 2018-09-20 PROCEDURE — 87486 CHLMYD PNEUM DNA AMP PROBE: CPT

## 2018-09-20 PROCEDURE — 36415 COLL VENOUS BLD VENIPUNCTURE: CPT

## 2018-09-20 PROCEDURE — 86923 COMPATIBILITY TEST ELECTRIC: CPT

## 2018-09-20 PROCEDURE — P9040: CPT

## 2018-09-20 PROCEDURE — 87581 M.PNEUMON DNA AMP PROBE: CPT

## 2018-09-20 PROCEDURE — 87640 STAPH A DNA AMP PROBE: CPT

## 2018-09-20 PROCEDURE — 83525 ASSAY OF INSULIN: CPT

## 2018-09-20 PROCEDURE — 94003 VENT MGMT INPAT SUBQ DAY: CPT

## 2018-09-20 PROCEDURE — 84100 ASSAY OF PHOSPHORUS: CPT

## 2018-09-20 PROCEDURE — 95819 EEG AWAKE AND ASLEEP: CPT

## 2018-09-20 PROCEDURE — 87070 CULTURE OTHR SPECIMN AEROBIC: CPT

## 2018-09-20 PROCEDURE — 86850 RBC ANTIBODY SCREEN: CPT

## 2018-09-20 PROCEDURE — 82553 CREATINE MB FRACTION: CPT

## 2018-09-20 PROCEDURE — 84206 ASSAY OF PROINSULIN: CPT

## 2018-09-20 PROCEDURE — 92610 EVALUATE SWALLOWING FUNCTION: CPT

## 2018-09-20 PROCEDURE — 71045 X-RAY EXAM CHEST 1 VIEW: CPT

## 2018-09-20 PROCEDURE — 80048 BASIC METABOLIC PNL TOTAL CA: CPT

## 2018-09-20 PROCEDURE — 87798 DETECT AGENT NOS DNA AMP: CPT

## 2018-09-20 PROCEDURE — 84443 ASSAY THYROID STIM HORMONE: CPT

## 2018-09-20 PROCEDURE — 82962 GLUCOSE BLOOD TEST: CPT

## 2018-09-20 PROCEDURE — 31500 INSERT EMERGENCY AIRWAY: CPT

## 2018-09-20 PROCEDURE — 80307 DRUG TEST PRSMV CHEM ANLYZR: CPT

## 2018-09-20 PROCEDURE — 86900 BLOOD TYPING SEROLOGIC ABO: CPT

## 2018-09-20 PROCEDURE — 82803 BLOOD GASES ANY COMBINATION: CPT

## 2018-09-20 PROCEDURE — 99291 CRITICAL CARE FIRST HOUR: CPT | Mod: 25

## 2018-09-20 PROCEDURE — 93005 ELECTROCARDIOGRAM TRACING: CPT

## 2018-09-20 PROCEDURE — 95957 EEG DIGITAL ANALYSIS: CPT

## 2018-09-20 PROCEDURE — 80202 ASSAY OF VANCOMYCIN: CPT

## 2018-09-20 PROCEDURE — 74176 CT ABD & PELVIS W/O CONTRAST: CPT

## 2018-09-20 PROCEDURE — 83605 ASSAY OF LACTIC ACID: CPT

## 2018-09-20 PROCEDURE — 85730 THROMBOPLASTIN TIME PARTIAL: CPT

## 2018-09-20 RX ADMIN — Medication 6: at 12:37

## 2018-09-20 RX ADMIN — Medication 1 DROP(S): at 05:38

## 2018-09-20 RX ADMIN — Medication 8 UNIT(S): at 08:28

## 2018-09-20 RX ADMIN — Medication 8 UNIT(S): at 12:37

## 2018-09-20 RX ADMIN — Medication 650 MILLIGRAM(S): at 06:08

## 2018-09-20 RX ADMIN — Medication 4: at 08:28

## 2018-09-20 RX ADMIN — Medication 650 MILLIGRAM(S): at 05:38

## 2018-09-20 RX ADMIN — LACTULOSE 10 GRAM(S): 10 SOLUTION ORAL at 05:38

## 2018-09-20 RX ADMIN — LACTULOSE 10 GRAM(S): 10 SOLUTION ORAL at 11:12

## 2018-09-20 NOTE — PROGRESS NOTE ADULT - PROBLEM SELECTOR PLAN 3
Hypoxic respiratory failure   afebrile last 24 hours.   NTD blood cx   CT abd: B/l lower lobe consolidation   c/w Iv antibiotics for now-> switch to PO on discharge
iv abx   pul eval
monitor blood glucose closely endo on case    improved  stable   endo on case
monitor blood glucose closely endo on case    improved  stable   endo on case
monitor blood glucose closely endo on case    improved  stble
-most likely secondary to hypoglycemia as patient was noted to be hypoglycemic to 40s last night at rehab, s/p Glucagon; fs in   -CT head negative for any acute event  -EKG: sinus tachycardia.   -CXR clear, lactate 0.6, UA pending ; s/p 1 dose of Zosyn in ED, not septic.   -No metabolic derangements currently  -Blood alcohol <3, serum acetaminophen and salicylate level wnl.   -Utox positive for benzodiazepines (takes valium 5 mg q12)  -Could be CVA v/s seizure, consider MRI when stable  -Continue with vent support, aspiration precautions  -Monitor blood sugars every 2 hours for now  -c/w fentanyl for sedation   -IV hydration
Hypoxic respiratory failure   afebrile last 24 hours.   NTD blood cx   CT abd: B/l lower lobe consolidation   c/w Iv antibiotics
Hypoxic respiratory failure   fever T max 100.5 in last 24 hours.   F/u blood culture   CT abd: B/l lower lobe consolidation   c/w Iv antibiotics
Hypoxic respiratory failure   fever T max 101 in last 24 hours.   NTD blood cx   CT abd: B/l lower lobe consolidation   c/w Iv antibiotics
monitor blood glucose closely endo on case    improved  stable   endo on case
Hypoxic respiratory failure   afebrile last 24 hours.   NTD blood cx   CT abd: B/l lower lobe consolidation   c/w Iv antibiotics
iv abx   pul eval

## 2018-09-20 NOTE — PROGRESS NOTE ADULT - ATTENDING COMMENTS
I have examined pt personally Hx chart lab and xrays reviewed and pt discussed with residents
steady improvement  follow up pending labs   continue current care
patient improved a lot   patient is clinically stable at present   d/c planning with po abx
patient improved a lot   stabilizing   if remains stable then d/c patient to hn for continuation of PT/OT IN AM
patient seen and examined along with resident   patient is clinically stable at present   d/c planning   rehab / home with home PT after family discussion
patient was seen and examined along with resident   above reviewed and agreed   patient is clinically stable for d/c to rehab   waiting for ins auth.
patient was seen and examined along with resident   above reviewed and agreed   patient is steadily improving   possible d/c back to NH Monday if stable
patient was seen and examined along with resident   patient got auth from health ins.  d/c to rehab today
continue care under icu   id , endo , pulmonary follow up
continue other care as per icu protocol  prognosis poor at this point
continue other care as per icu protocol  prognosis poor at this point  id , pul eval  consider neuro if change of mental status persists
over all improving  continue current care
steady improvement  continue current care
Please refer to my note from today.
please see my note from today
Please refer to my note from today.

## 2018-09-20 NOTE — PROGRESS NOTE ADULT - SUBJECTIVE AND OBJECTIVE BOX
Patient is a 57y old  Female who presents with a chief complaint of unresponsiveness (19 Sep 2018 17:06)      Overnight Events: None    REVIEW OF SYSTEMS:    CONSTITUTIONAL: No weakness, fevers or chills  RESPIRATORY: No cough, wheezing, hemoptysis; No shortness of breath  CARDIOVASCULAR: No chest pain or palpitations  GASTROINTESTINAL: No abdominal or epigastric pain. No nausea, vomiting, or hematemesis; No diarrhea or constipation. No melena or hematochezia.  NEUROLOGICAL: No numbness or weakness  All other review of systems is negative unless indicated above.    MEDICATIONS  (STANDING):  artificial  tears Solution 1 Drop(s) Both EYES two times a day  aspirin  chewable 81 milliGRAM(s) Oral daily  enoxaparin Injectable 40 milliGRAM(s) SubCutaneous daily  ferrous    sulfate Liquid 300 milliGRAM(s) Oral daily  insulin glargine Injectable (LANTUS) 18 Unit(s) SubCutaneous at bedtime  insulin lispro (HumaLOG) corrective regimen sliding scale   SubCutaneous Before meals and at bedtime  insulin lispro Injectable (HumaLOG) 8 Unit(s) SubCutaneous three times a day before meals  lactulose Syrup 10 Gram(s) Oral two times a day  simvastatin 20 milliGRAM(s) Oral at bedtime    MEDICATIONS  (PRN):  acetaminophen    Suspension .. 650 milliGRAM(s) Oral every 6 hours PRN Temp greater or equal to 38C (100.4F), Mild Pain (1 - 3)  sodium biphosphate Rectal Enema 1 Enema Rectal daily PRN constipation    Vital Signs Last 24 Hrs  T(C): 36.6 (20 Sep 2018 05:11), Max: 36.9 (19 Sep 2018 21:02)  T(F): 97.8 (20 Sep 2018 05:11), Max: 98.4 (19 Sep 2018 21:02)  HR: 86 (20 Sep 2018 05:11) (78 - 104)  BP: 122/76 (20 Sep 2018 05:11) (119/68 - 143/84)  BP(mean): --  RR: 17 (20 Sep 2018 05:11) (16 - 18)  SpO2: 100% (20 Sep 2018 05:11) (97% - 100%)    General: WN/WD NAD  Neurology: A&Ox3  Respiratory: CTA B/L, no wheezes, no rhonchi, no rales  CV: RRR, S1S2, no murmur  Abdominal: Soft, NT, ND no palpable mass, bowel sounds positive  MSK: No edema, + peripheral pulses      Labs:

## 2018-09-20 NOTE — PROGRESS NOTE ADULT - NSHPATTENDINGPLANDISCUSS_GEN_ALL_CORE
nh rn , er attending , icu resident and patient's  at bedside.
hs and family
Icu team and PMD
icu resident , and patient's  .
resident and patient's family
resident and patient's family-  at bedside
resident , patient and her 
resident and patient and her 
resident and patient's family
resident and patient's family.
icu resident , and patient's  .
icu resident , and patient's  .
icu resident , attending, and patient's  .
icu resident and patient's  .
icu resident , and patient's  .

## 2018-09-20 NOTE — PROGRESS NOTE ADULT - PROVIDER SPECIALTY LIST ADULT
Critical Care
Endocrinology
Infectious Disease
Internal Medicine
Neurology
Pulmonology
Pulmonology
Critical Care
Pulmonology
Internal Medicine
Critical Care

## 2018-09-20 NOTE — PROGRESS NOTE ADULT - PROBLEM SELECTOR PROBLEM 2
Septic shock
Hemorrhagic shock
Pneumonia
Septic shock
Hemorrhagic shock

## 2018-09-20 NOTE — PROGRESS NOTE ADULT - ASSESSMENT
wanted new NH in [place]  -referral made, facility accepted  -auth obtained this morning  -will be discharged at 1pm

## 2018-09-20 NOTE — PROGRESS NOTE ADULT - PROBLEM SELECTOR PROBLEM 3
Pneumonia
Hypoglycemia
Pneumonia
Hypoglycemia
Pneumonia
Unresponsiveness
Pneumonia
Pneumonia

## 2018-09-20 NOTE — PROGRESS NOTE ADULT - REASON FOR ADMISSION
unresponsiveness

## 2019-10-24 NOTE — DIETITIAN INITIAL EVALUATION ADULT. - PT NOT SOURCE
10/24/2019    Patient presents today for:   Chief Complaint   Patient presents with   • Blood Pressure Check       Medications:  Current Outpatient Medications   Medication Sig   • naproxen (ANAPROX) 550 MG tablet Take 1 tablet by mouth 2 times daily (with meals).   • carboxymethylcellulose-glycerin-polysorbate (REFRESH OPTIVE ADVANCED) 0.5-1-0.5 % ophthalmic solution Apply 1 drop to eye 4 times daily.   • amLODIPine (NORVASC) 5 MG tablet Take 1 tablet by mouth daily.   • acetaminophen (TYLENOL) 325 MG tablet Take 650 mg by mouth as needed for Pain.     No current facility-administered medications for this visit.        Last dose of blood pressure medication taken at: this AM     Visit Vitals  LMP 04/15/2019 (Approximate)       Results discussed with Elma Corea DO and advised the following:  Continue with current medications.     Dinah Murphy RN             intubated

## 2022-07-28 NOTE — PHYSICAL THERAPY INITIAL EVALUATION ADULT - PHYSICAL ASSIST/NONPHYSICAL ASSIST: SUPINE/SIT, REHAB EVAL
Pharmacy Consultation Note  (Antibiotic Dosing and Monitoring)    Initial consult date: 7/26  Consulting physician/provider: Flynn Cunningham  Drug: Vancomycin  Indication: Pneumonia    Vancomycin was discontinued by Dr. Flynn Cunningham. MRSA swab negative and oxidase positive GNR pending from bronchial washing, likely Pseudomonas aeruginosa. Pharmacy will sign-off.      Clover Caballero, PharmD  ICU Pharmacy x [phone]  07/28/22   12:01 PM verbal cues/1 person assist/set-up required

## 2024-12-17 NOTE — CONSULT NOTE ADULT - REASON FOR ADMISSION
unresponsiveness
.
